# Patient Record
Sex: MALE | Race: ASIAN | NOT HISPANIC OR LATINO
[De-identification: names, ages, dates, MRNs, and addresses within clinical notes are randomized per-mention and may not be internally consistent; named-entity substitution may affect disease eponyms.]

---

## 2018-03-19 PROBLEM — Z00.00 ENCOUNTER FOR PREVENTIVE HEALTH EXAMINATION: Status: ACTIVE | Noted: 2018-03-19

## 2018-03-23 ENCOUNTER — APPOINTMENT (OUTPATIENT)
Dept: PLASTIC SURGERY | Facility: CLINIC | Age: 78
End: 2018-03-23

## 2019-07-25 ENCOUNTER — EMERGENCY (EMERGENCY)
Facility: HOSPITAL | Age: 79
LOS: 1 days | Discharge: HOME | End: 2019-07-25
Attending: EMERGENCY MEDICINE | Admitting: EMERGENCY MEDICINE
Payer: MEDICARE

## 2019-07-25 VITALS
HEART RATE: 82 BPM | TEMPERATURE: 98 F | DIASTOLIC BLOOD PRESSURE: 82 MMHG | HEIGHT: 66.93 IN | OXYGEN SATURATION: 95 % | SYSTOLIC BLOOD PRESSURE: 138 MMHG | RESPIRATION RATE: 16 BRPM | WEIGHT: 164.02 LBS

## 2019-07-25 LAB
ALBUMIN SERPL ELPH-MCNC: 3.6 G/DL — SIGNIFICANT CHANGE UP (ref 3.3–5)
ALP SERPL-CCNC: 108 U/L — SIGNIFICANT CHANGE UP (ref 30–120)
ALT FLD-CCNC: 32 U/L DA — SIGNIFICANT CHANGE UP (ref 10–60)
ANION GAP SERPL CALC-SCNC: 12 MMOL/L — SIGNIFICANT CHANGE UP (ref 5–17)
APPEARANCE UR: ABNORMAL
APTT BLD: 29.1 SEC — SIGNIFICANT CHANGE UP (ref 28.5–37)
AST SERPL-CCNC: 25 U/L — SIGNIFICANT CHANGE UP (ref 10–40)
BACTERIA # UR AUTO: ABNORMAL
BASOPHILS # BLD AUTO: 0.03 K/UL — SIGNIFICANT CHANGE UP (ref 0–0.2)
BASOPHILS NFR BLD AUTO: 0.2 % — SIGNIFICANT CHANGE UP (ref 0–2)
BILIRUB SERPL-MCNC: 0.6 MG/DL — SIGNIFICANT CHANGE UP (ref 0.2–1.2)
BILIRUB UR-MCNC: NEGATIVE — SIGNIFICANT CHANGE UP
BUN SERPL-MCNC: 11 MG/DL — SIGNIFICANT CHANGE UP (ref 7–23)
CALCIUM SERPL-MCNC: 8.9 MG/DL — SIGNIFICANT CHANGE UP (ref 8.4–10.5)
CHLORIDE SERPL-SCNC: 105 MMOL/L — SIGNIFICANT CHANGE UP (ref 96–108)
CO2 SERPL-SCNC: 20 MMOL/L — LOW (ref 22–31)
COLOR SPEC: YELLOW — SIGNIFICANT CHANGE UP
CREAT SERPL-MCNC: 0.98 MG/DL — SIGNIFICANT CHANGE UP (ref 0.5–1.3)
DIFF PNL FLD: ABNORMAL
EOSINOPHIL # BLD AUTO: 0.1 K/UL — SIGNIFICANT CHANGE UP (ref 0–0.5)
EOSINOPHIL NFR BLD AUTO: 0.7 % — SIGNIFICANT CHANGE UP (ref 0–6)
EPI CELLS # UR: SIGNIFICANT CHANGE UP
GLUCOSE SERPL-MCNC: 125 MG/DL — HIGH (ref 70–99)
GLUCOSE UR QL: NEGATIVE MG/DL — SIGNIFICANT CHANGE UP
HCT VFR BLD CALC: 39.9 % — SIGNIFICANT CHANGE UP (ref 39–50)
HGB BLD-MCNC: 13.6 G/DL — SIGNIFICANT CHANGE UP (ref 13–17)
IMM GRANULOCYTES NFR BLD AUTO: 0.7 % — SIGNIFICANT CHANGE UP (ref 0–1.5)
INR BLD: 0.96 RATIO — SIGNIFICANT CHANGE UP (ref 0.88–1.16)
KETONES UR-MCNC: NEGATIVE — SIGNIFICANT CHANGE UP
LEUKOCYTE ESTERASE UR-ACNC: ABNORMAL
LYMPHOCYTES # BLD AUTO: 1.69 K/UL — SIGNIFICANT CHANGE UP (ref 1–3.3)
LYMPHOCYTES # BLD AUTO: 12.5 % — LOW (ref 13–44)
MCHC RBC-ENTMCNC: 32.5 PG — SIGNIFICANT CHANGE UP (ref 27–34)
MCHC RBC-ENTMCNC: 34.1 GM/DL — SIGNIFICANT CHANGE UP (ref 32–36)
MCV RBC AUTO: 95.5 FL — SIGNIFICANT CHANGE UP (ref 80–100)
MONOCYTES # BLD AUTO: 0.95 K/UL — HIGH (ref 0–0.9)
MONOCYTES NFR BLD AUTO: 7.1 % — SIGNIFICANT CHANGE UP (ref 2–14)
NEUTROPHILS # BLD AUTO: 10.61 K/UL — HIGH (ref 1.8–7.4)
NEUTROPHILS NFR BLD AUTO: 78.8 % — HIGH (ref 43–77)
NITRITE UR-MCNC: POSITIVE
NRBC # BLD: 0 /100 WBCS — SIGNIFICANT CHANGE UP (ref 0–0)
PH UR: 5 — SIGNIFICANT CHANGE UP (ref 5–8)
PLATELET # BLD AUTO: 294 K/UL — SIGNIFICANT CHANGE UP (ref 150–400)
POTASSIUM SERPL-MCNC: 4.2 MMOL/L — SIGNIFICANT CHANGE UP (ref 3.5–5.3)
POTASSIUM SERPL-SCNC: 4.2 MMOL/L — SIGNIFICANT CHANGE UP (ref 3.5–5.3)
PROT SERPL-MCNC: 6.9 G/DL — SIGNIFICANT CHANGE UP (ref 6–8.3)
PROT UR-MCNC: 500 MG/DL
PROTHROM AB SERPL-ACNC: 10.5 SEC — SIGNIFICANT CHANGE UP (ref 10–12.9)
RBC # BLD: 4.18 M/UL — LOW (ref 4.2–5.8)
RBC # FLD: 12.9 % — SIGNIFICANT CHANGE UP (ref 10.3–14.5)
RBC CASTS # UR COMP ASSIST: ABNORMAL /HPF (ref 0–4)
SODIUM SERPL-SCNC: 137 MMOL/L — SIGNIFICANT CHANGE UP (ref 135–145)
SP GR SPEC: 1.01 — SIGNIFICANT CHANGE UP (ref 1.01–1.02)
UROBILINOGEN FLD QL: NEGATIVE MG/DL — SIGNIFICANT CHANGE UP
WBC # BLD: 13.47 K/UL — HIGH (ref 3.8–10.5)
WBC # FLD AUTO: 13.47 K/UL — HIGH (ref 3.8–10.5)
WBC UR QL: >50

## 2019-07-25 PROCEDURE — 85610 PROTHROMBIN TIME: CPT

## 2019-07-25 PROCEDURE — 74176 CT ABD & PELVIS W/O CONTRAST: CPT

## 2019-07-25 PROCEDURE — 36415 COLL VENOUS BLD VENIPUNCTURE: CPT

## 2019-07-25 PROCEDURE — 85730 THROMBOPLASTIN TIME PARTIAL: CPT

## 2019-07-25 PROCEDURE — 96374 THER/PROPH/DIAG INJ IV PUSH: CPT

## 2019-07-25 PROCEDURE — 99284 EMERGENCY DEPT VISIT MOD MDM: CPT | Mod: 25

## 2019-07-25 PROCEDURE — 85027 COMPLETE CBC AUTOMATED: CPT

## 2019-07-25 PROCEDURE — 81001 URINALYSIS AUTO W/SCOPE: CPT

## 2019-07-25 PROCEDURE — 80053 COMPREHEN METABOLIC PANEL: CPT

## 2019-07-25 PROCEDURE — 74176 CT ABD & PELVIS W/O CONTRAST: CPT | Mod: 26

## 2019-07-25 PROCEDURE — 99284 EMERGENCY DEPT VISIT MOD MDM: CPT

## 2019-07-25 PROCEDURE — 87086 URINE CULTURE/COLONY COUNT: CPT

## 2019-07-25 PROCEDURE — 51702 INSERT TEMP BLADDER CATH: CPT

## 2019-07-25 RX ORDER — SODIUM CHLORIDE 9 MG/ML
1000 INJECTION INTRAMUSCULAR; INTRAVENOUS; SUBCUTANEOUS ONCE
Refills: 0 | Status: COMPLETED | OUTPATIENT
Start: 2019-07-25 | End: 2019-07-25

## 2019-07-25 RX ORDER — CEFUROXIME AXETIL 250 MG
1 TABLET ORAL
Qty: 20 | Refills: 0
Start: 2019-07-25 | End: 2019-08-03

## 2019-07-25 RX ORDER — CEFTRIAXONE 500 MG/1
1000 INJECTION, POWDER, FOR SOLUTION INTRAMUSCULAR; INTRAVENOUS ONCE
Refills: 0 | Status: COMPLETED | OUTPATIENT
Start: 2019-07-25 | End: 2019-07-25

## 2019-07-25 RX ADMIN — CEFTRIAXONE 100 MILLIGRAM(S): 500 INJECTION, POWDER, FOR SOLUTION INTRAMUSCULAR; INTRAVENOUS at 07:20

## 2019-07-25 RX ADMIN — SODIUM CHLORIDE 1000 MILLILITER(S): 9 INJECTION INTRAMUSCULAR; INTRAVENOUS; SUBCUTANEOUS at 07:10

## 2019-07-25 NOTE — ED PROVIDER NOTE - CARE PLAN
Principal Discharge DX:	Urinary retention Principal Discharge DX:	Urinary retention  Secondary Diagnosis:	Acute cystitis without hematuria

## 2019-07-25 NOTE — ED ADULT NURSE NOTE - OBJECTIVE STATEMENT
78 yr old male c/o incomplete emptying of bladder, burning with urination, small clots with urination and suprapubic pressure x20 days

## 2019-07-25 NOTE — ED PROVIDER NOTE - CARE PROVIDER_API CALL
Brian Jj)  Urology  [address], [address]  [place] [address]  Phone: [phone]  Fax: [phone]  Follow Up Time:

## 2019-07-25 NOTE — ED PROVIDER NOTE - OBJECTIVE STATEMENT
Patient has had trouble passing urine for 2-3 days. Today, is passing small amounts of blood. Feels pressure in his lower abdomen. No fever. No n/v/d/c. No history of same problem.

## 2019-07-25 NOTE — ED ADULT NURSE NOTE - NSIMPLEMENTINTERV_GEN_ALL_ED
Implemented All Fall with Harm Risk Interventions:  [place] to call system. Call bell, personal items and telephone within reach. Instruct patient to call for assistance. Room bathroom lighting operational. Non-slip footwear when patient is off stretcher. Physically safe environment: no spills, clutter or unnecessary equipment. Stretcher in lowest position, wheels locked, appropriate side rails in place. Provide visual cue, wrist band, yellow gown, etc. Monitor gait and stability. Monitor for mental status changes and reorient to person, place, and time. Review medications for side effects contributing to fall risk. Reinforce activity limits and safety measures with patient and family. Provide visual clues: red socks.

## 2019-07-25 NOTE — ED PROVIDER NOTE - PROGRESS NOTE DETAILS
labs, ct results discussed with son, agrees to f/u with PMD, recommend urologist, given name of Dr. Jj

## 2019-07-26 ENCOUNTER — TRANSCRIPTION ENCOUNTER (OUTPATIENT)
Age: 79
End: 2019-07-26

## 2019-07-26 ENCOUNTER — APPOINTMENT (OUTPATIENT)
Dept: UROLOGY | Facility: CLINIC | Age: 79
End: 2019-07-26
Payer: MEDICARE

## 2019-07-26 DIAGNOSIS — E11.9 TYPE 2 DIABETES MELLITUS W/OUT COMPLICATIONS: ICD-10-CM

## 2019-07-26 DIAGNOSIS — Z80.0 FAMILY HISTORY OF MALIGNANT NEOPLASM OF DIGESTIVE ORGANS: ICD-10-CM

## 2019-07-26 DIAGNOSIS — Z83.3 FAMILY HISTORY OF DIABETES MELLITUS: ICD-10-CM

## 2019-07-26 DIAGNOSIS — Z86.79 PERSONAL HISTORY OF OTHER DISEASES OF THE CIRCULATORY SYSTEM: ICD-10-CM

## 2019-07-26 DIAGNOSIS — Z82.49 FAMILY HISTORY OF ISCHEMIC HEART DISEASE AND OTHER DISEASES OF THE CIRCULATORY SYSTEM: ICD-10-CM

## 2019-07-26 DIAGNOSIS — Z87.891 PERSONAL HISTORY OF NICOTINE DEPENDENCE: ICD-10-CM

## 2019-07-26 LAB
CULTURE RESULTS: SIGNIFICANT CHANGE UP
SPECIMEN SOURCE: SIGNIFICANT CHANGE UP

## 2019-07-26 PROCEDURE — 99205 OFFICE O/P NEW HI 60 MIN: CPT

## 2019-07-30 ENCOUNTER — EMERGENCY (EMERGENCY)
Facility: HOSPITAL | Age: 79
LOS: 1 days | Discharge: HOME | End: 2019-07-30
Attending: EMERGENCY MEDICINE | Admitting: EMERGENCY MEDICINE
Payer: MEDICARE

## 2019-07-30 VITALS
TEMPERATURE: 98 F | HEART RATE: 78 BPM | RESPIRATION RATE: 18 BRPM | OXYGEN SATURATION: 98 % | DIASTOLIC BLOOD PRESSURE: 70 MMHG | SYSTOLIC BLOOD PRESSURE: 130 MMHG

## 2019-07-30 VITALS
RESPIRATION RATE: 16 BRPM | HEIGHT: 68 IN | WEIGHT: 166.89 LBS | TEMPERATURE: 98 F | DIASTOLIC BLOOD PRESSURE: 87 MMHG | OXYGEN SATURATION: 98 % | SYSTOLIC BLOOD PRESSURE: 151 MMHG | HEART RATE: 65 BPM

## 2019-07-30 LAB
APPEARANCE UR: CLEAR — SIGNIFICANT CHANGE UP
BACTERIA # UR AUTO: ABNORMAL
BILIRUB UR-MCNC: NEGATIVE — SIGNIFICANT CHANGE UP
COLOR SPEC: YELLOW — SIGNIFICANT CHANGE UP
DIFF PNL FLD: ABNORMAL
EPI CELLS # UR: SIGNIFICANT CHANGE UP
GLUCOSE UR QL: NEGATIVE MG/DL — SIGNIFICANT CHANGE UP
KETONES UR-MCNC: NEGATIVE — SIGNIFICANT CHANGE UP
LEUKOCYTE ESTERASE UR-ACNC: ABNORMAL
NITRITE UR-MCNC: NEGATIVE — SIGNIFICANT CHANGE UP
PH UR: 7 — SIGNIFICANT CHANGE UP (ref 5–8)
PROT UR-MCNC: 30 MG/DL
RBC CASTS # UR COMP ASSIST: ABNORMAL /HPF (ref 0–4)
SP GR SPEC: 1.01 — SIGNIFICANT CHANGE UP (ref 1.01–1.02)
UROBILINOGEN FLD QL: NEGATIVE MG/DL — SIGNIFICANT CHANGE UP
WBC UR QL: ABNORMAL

## 2019-07-30 PROCEDURE — 51702 INSERT TEMP BLADDER CATH: CPT

## 2019-07-30 PROCEDURE — 81001 URINALYSIS AUTO W/SCOPE: CPT

## 2019-07-30 PROCEDURE — 99284 EMERGENCY DEPT VISIT MOD MDM: CPT

## 2019-07-30 PROCEDURE — 99284 EMERGENCY DEPT VISIT MOD MDM: CPT | Mod: 25

## 2019-07-30 RX ORDER — LIDOCAINE HCL 20 MG/ML
10 VIAL (ML) INJECTION ONCE
Refills: 0 | Status: COMPLETED | OUTPATIENT
Start: 2019-07-30 | End: 2019-07-30

## 2019-07-30 RX ORDER — TRAMADOL HYDROCHLORIDE 50 MG/1
50 TABLET ORAL ONCE
Refills: 0 | Status: DISCONTINUED | OUTPATIENT
Start: 2019-07-30 | End: 2019-07-30

## 2019-07-30 RX ORDER — TRAMADOL HYDROCHLORIDE 50 MG/1
1 TABLET ORAL
Qty: 12 | Refills: 0
Start: 2019-07-30

## 2019-07-30 RX ORDER — ACETAMINOPHEN 500 MG
650 TABLET ORAL ONCE
Refills: 0 | Status: COMPLETED | OUTPATIENT
Start: 2019-07-30 | End: 2019-07-30

## 2019-07-30 RX ADMIN — TRAMADOL HYDROCHLORIDE 50 MILLIGRAM(S): 50 TABLET ORAL at 19:52

## 2019-07-30 RX ADMIN — Medication 650 MILLIGRAM(S): at 19:35

## 2019-07-30 RX ADMIN — Medication 650 MILLIGRAM(S): at 19:16

## 2019-07-30 RX ADMIN — Medication 10 MILLILITER(S): at 19:16

## 2019-07-30 NOTE — ED PROCEDURE NOTE - CPROC ED URIN DEVICE DETAIL1
Using strict sterile technique, an indwelling urinary device was inserted through the urethral meatus, and into the bladder (see size above)./sterile technique, old indwelling urinary removed and new one inserted
Using strict sterile technique, an indwelling urinary device was inserted through the urethral meatus, and into the bladder (see size above).

## 2019-07-30 NOTE — ED ADULT NURSE NOTE - OBJECTIVE STATEMENT
Amb to ED with his son. Pt had callaway placed 5 days ago for hematuria- seen the following day by his urologist Dr Rodgers. Today states he is having pain at penis & noted his pants were wet. O/E callaway draining clear, light yellow urine. Undergarment wet with urine.

## 2019-07-30 NOTE — ED PROCEDURE NOTE - CPROC ED POST PROC CARE GUIDE1
Instructed patient/caregiver to follow-up with primary care physician./Verbal/written post procedure instructions were given to patient/caregiver.
follow up with urology as scheduled./Verbal/written post procedure instructions were given to patient/caregiver.

## 2019-07-30 NOTE — ED PROVIDER NOTE - PROGRESS NOTE DETAILS
18Fr callaway cath placed easily into bladder with drainage of clear yellow urine. No leakage noted. Upon discharge pt complained of too much urethral pain from 18 fr callaway and requested it be removed and 16 Fr callaway be inserted. Rx Tramadol for pain.

## 2019-07-30 NOTE — ED PROCEDURE NOTE - CPROC ED TIME OUT STATEMENT1
“Patient's name, , procedure and correct site were confirmed during the [facility] Timeout.”
“Patient's name, , procedure and correct site were confirmed during the [facility] Timeout.”

## 2019-07-30 NOTE — ED PROVIDER NOTE - CLINICAL SUMMARY MEDICAL DECISION MAKING FREE TEXT BOX
leaking callaway , plan - will replace callaway and place in larger size and follow up with urology as scheduled.

## 2019-07-30 NOTE — ED PROVIDER NOTE - OBJECTIVE STATEMENT
79 y/o male with a PMHx of HTN, DM, BPH presents to the ED c/o urinary retention. On 7/25 was seen at the ED for hematuria, was dx with a UTI and had a catheter placed and rx abx. The next day pt follow up with a urologist. Today pt has some facial swelling, had some discomfort earlier from urinary retention but now resolved. No other sx or pain/discomfort. Has an appointment with urologist 8/2.     Urologist: Soni Coleman  Son translated per pt upon pt request. 79 y/o male with a PMHx of HTN, DM, BPH presents to the ED c/o urinary retention. On 7/25 was seen at the ED for hematuria and urinary retention, was dx with a UTI and had a catheter placed and rx abx. The next day pt follow up with a urologist. Today pt has some facial swelling, had some discomfort earlier from urinary retention but now resolved. No other sx or pain/discomfort. Has an appointment with urologist 8/2.     Urologist: Soni Coleman  Son translated per pt upon pt request. 77 y/o male with a PMHx of HTN, DM, BPH presents to the ED c/o urinary retention. On 7/25 was seen at the ED for hematuria and urinary retention, was dx with a UTI and had a catheter placed and rx abx. The next day pt follow up with a urologist. Today pt has some facial swelling, had some discomfort earlier from urinary retention but now resolved. No other sx or pain/discomfort. Has an appointment with urologist 8/2.   Pt and son are poor historians and cannot give much info about medications or medical hx.  Urologist: Soni Coleman  Son translated per pt upon pt request.

## 2019-07-30 NOTE — ED PROVIDER NOTE - GENITOURINARY BLADDER
callaway catheter in lace with clear yellow urine in leg bag, some leaking noted around callaway./non-tender/non-distended

## 2019-07-30 NOTE — ED ADULT TRIAGE NOTE - CHIEF COMPLAINT QUOTE
" Last Thursday catheter was inserted because he could not pee. He is complaining  of pain on his penis "

## 2019-07-30 NOTE — ED PROCEDURE NOTE - NS_ATTENDINGSCRIBE_ED_ALL_ED
----- Message from Juliana Cardona sent at 8/24/2018 10:29 AM CDT -----  Contact: Self/ [phone]  Pt requesting antibiotics to be called in. Says she has been having fever for a couple days. Please call to advise. Thank you.    [facility] - [address] [address]  Phone: [phone] Fax: [phone]  
Patient reports having a low-grade fever for the last 4 days accompanied by generalized body aches.  OV scheduled for today with Sarah DAVIS.  
I personally performed the service described in the documentation recorded by the scribe in my presence, and it accurately and completely records my words and actions.

## 2019-07-31 ENCOUNTER — EMERGENCY (EMERGENCY)
Facility: HOSPITAL | Age: 79
LOS: 1 days | Discharge: LEFT BEFORE DISCHARGE | End: 2019-07-31
Attending: EMERGENCY MEDICINE | Admitting: EMERGENCY MEDICINE
Payer: MEDICARE

## 2019-07-31 VITALS
TEMPERATURE: 98 F | OXYGEN SATURATION: 96 % | DIASTOLIC BLOOD PRESSURE: 68 MMHG | SYSTOLIC BLOOD PRESSURE: 136 MMHG | HEART RATE: 65 BPM | RESPIRATION RATE: 17 BRPM

## 2019-07-31 VITALS
HEART RATE: 65 BPM | OXYGEN SATURATION: 96 % | RESPIRATION RATE: 18 BRPM | DIASTOLIC BLOOD PRESSURE: 91 MMHG | HEIGHT: 68 IN | TEMPERATURE: 98 F | SYSTOLIC BLOOD PRESSURE: 148 MMHG | WEIGHT: 166.89 LBS

## 2019-07-31 LAB
ALBUMIN SERPL ELPH-MCNC: 3.7 G/DL — SIGNIFICANT CHANGE UP (ref 3.3–5)
ALP SERPL-CCNC: 107 U/L — SIGNIFICANT CHANGE UP (ref 30–120)
ALT FLD-CCNC: 34 U/L DA — SIGNIFICANT CHANGE UP (ref 10–60)
ANION GAP SERPL CALC-SCNC: 8 MMOL/L — SIGNIFICANT CHANGE UP (ref 5–17)
ANION GAP SERPL CALC-SCNC: 9 MMOL/L — SIGNIFICANT CHANGE UP (ref 5–17)
AST SERPL-CCNC: 26 U/L — SIGNIFICANT CHANGE UP (ref 10–40)
BASOPHILS # BLD AUTO: 0.03 K/UL — SIGNIFICANT CHANGE UP (ref 0–0.2)
BASOPHILS NFR BLD AUTO: 0.3 % — SIGNIFICANT CHANGE UP (ref 0–2)
BILIRUB SERPL-MCNC: 1 MG/DL — SIGNIFICANT CHANGE UP (ref 0.2–1.2)
BUN SERPL-MCNC: 10 MG/DL — SIGNIFICANT CHANGE UP (ref 7–23)
BUN SERPL-MCNC: 11 MG/DL — SIGNIFICANT CHANGE UP (ref 7–23)
CALCIUM SERPL-MCNC: 7.6 MG/DL — LOW (ref 8.4–10.5)
CALCIUM SERPL-MCNC: 8.8 MG/DL — SIGNIFICANT CHANGE UP (ref 8.4–10.5)
CHLORIDE SERPL-SCNC: 88 MMOL/L — LOW (ref 96–108)
CHLORIDE SERPL-SCNC: 96 MMOL/L — SIGNIFICANT CHANGE UP (ref 96–108)
CO2 SERPL-SCNC: 21 MMOL/L — LOW (ref 22–31)
CO2 SERPL-SCNC: 22 MMOL/L — SIGNIFICANT CHANGE UP (ref 22–31)
CREAT SERPL-MCNC: 0.72 MG/DL — SIGNIFICANT CHANGE UP (ref 0.5–1.3)
CREAT SERPL-MCNC: 0.81 MG/DL — SIGNIFICANT CHANGE UP (ref 0.5–1.3)
EOSINOPHIL # BLD AUTO: 0.05 K/UL — SIGNIFICANT CHANGE UP (ref 0–0.5)
EOSINOPHIL NFR BLD AUTO: 0.5 % — SIGNIFICANT CHANGE UP (ref 0–6)
GLUCOSE SERPL-MCNC: 127 MG/DL — HIGH (ref 70–99)
GLUCOSE SERPL-MCNC: 151 MG/DL — HIGH (ref 70–99)
HCT VFR BLD CALC: 36 % — LOW (ref 39–50)
HGB BLD-MCNC: 12.8 G/DL — LOW (ref 13–17)
IMM GRANULOCYTES NFR BLD AUTO: 0.7 % — SIGNIFICANT CHANGE UP (ref 0–1.5)
LYMPHOCYTES # BLD AUTO: 1.35 K/UL — SIGNIFICANT CHANGE UP (ref 1–3.3)
LYMPHOCYTES # BLD AUTO: 13.8 % — SIGNIFICANT CHANGE UP (ref 13–44)
MAGNESIUM SERPL-MCNC: 1.9 MG/DL — SIGNIFICANT CHANGE UP (ref 1.6–2.6)
MCHC RBC-ENTMCNC: 32.2 PG — SIGNIFICANT CHANGE UP (ref 27–34)
MCHC RBC-ENTMCNC: 35.6 GM/DL — SIGNIFICANT CHANGE UP (ref 32–36)
MCV RBC AUTO: 90.5 FL — SIGNIFICANT CHANGE UP (ref 80–100)
MONOCYTES # BLD AUTO: 0.87 K/UL — SIGNIFICANT CHANGE UP (ref 0–0.9)
MONOCYTES NFR BLD AUTO: 8.9 % — SIGNIFICANT CHANGE UP (ref 2–14)
NEUTROPHILS # BLD AUTO: 7.42 K/UL — HIGH (ref 1.8–7.4)
NEUTROPHILS NFR BLD AUTO: 75.8 % — SIGNIFICANT CHANGE UP (ref 43–77)
NRBC # BLD: 0 /100 WBCS — SIGNIFICANT CHANGE UP (ref 0–0)
PLATELET # BLD AUTO: 282 K/UL — SIGNIFICANT CHANGE UP (ref 150–400)
POTASSIUM SERPL-MCNC: 3.7 MMOL/L — SIGNIFICANT CHANGE UP (ref 3.5–5.3)
POTASSIUM SERPL-MCNC: 3.8 MMOL/L — SIGNIFICANT CHANGE UP (ref 3.5–5.3)
POTASSIUM SERPL-SCNC: 3.7 MMOL/L — SIGNIFICANT CHANGE UP (ref 3.5–5.3)
POTASSIUM SERPL-SCNC: 3.8 MMOL/L — SIGNIFICANT CHANGE UP (ref 3.5–5.3)
PROT SERPL-MCNC: 7.1 G/DL — SIGNIFICANT CHANGE UP (ref 6–8.3)
RBC # BLD: 3.98 M/UL — LOW (ref 4.2–5.8)
RBC # FLD: 11.9 % — SIGNIFICANT CHANGE UP (ref 10.3–14.5)
SODIUM SERPL-SCNC: 119 MMOL/L — CRITICAL LOW (ref 135–145)
SODIUM SERPL-SCNC: 125 MMOL/L — LOW (ref 135–145)
WBC # BLD: 9.79 K/UL — SIGNIFICANT CHANGE UP (ref 3.8–10.5)
WBC # FLD AUTO: 9.79 K/UL — SIGNIFICANT CHANGE UP (ref 3.8–10.5)

## 2019-07-31 PROCEDURE — 80053 COMPREHEN METABOLIC PANEL: CPT

## 2019-07-31 PROCEDURE — 36415 COLL VENOUS BLD VENIPUNCTURE: CPT

## 2019-07-31 PROCEDURE — 70450 CT HEAD/BRAIN W/O DYE: CPT | Mod: 26

## 2019-07-31 PROCEDURE — 96361 HYDRATE IV INFUSION ADD-ON: CPT

## 2019-07-31 PROCEDURE — 70450 CT HEAD/BRAIN W/O DYE: CPT

## 2019-07-31 PROCEDURE — 83735 ASSAY OF MAGNESIUM: CPT

## 2019-07-31 PROCEDURE — 99284 EMERGENCY DEPT VISIT MOD MDM: CPT | Mod: 25

## 2019-07-31 PROCEDURE — 96374 THER/PROPH/DIAG INJ IV PUSH: CPT

## 2019-07-31 PROCEDURE — 85027 COMPLETE CBC AUTOMATED: CPT

## 2019-07-31 PROCEDURE — 80048 BASIC METABOLIC PNL TOTAL CA: CPT

## 2019-07-31 PROCEDURE — 99284 EMERGENCY DEPT VISIT MOD MDM: CPT

## 2019-07-31 RX ORDER — SODIUM CHLORIDE 9 MG/ML
1000 INJECTION INTRAMUSCULAR; INTRAVENOUS; SUBCUTANEOUS ONCE
Refills: 0 | Status: COMPLETED | OUTPATIENT
Start: 2019-07-31 | End: 2019-07-31

## 2019-07-31 RX ORDER — MECLIZINE HCL 12.5 MG
25 TABLET ORAL ONCE
Refills: 0 | Status: COMPLETED | OUTPATIENT
Start: 2019-07-31 | End: 2019-07-31

## 2019-07-31 RX ORDER — ONDANSETRON 8 MG/1
4 TABLET, FILM COATED ORAL ONCE
Refills: 0 | Status: COMPLETED | OUTPATIENT
Start: 2019-07-31 | End: 2019-07-31

## 2019-07-31 RX ADMIN — SODIUM CHLORIDE 1000 MILLILITER(S): 9 INJECTION INTRAMUSCULAR; INTRAVENOUS; SUBCUTANEOUS at 20:15

## 2019-07-31 RX ADMIN — SODIUM CHLORIDE 500 MILLILITER(S): 9 INJECTION INTRAMUSCULAR; INTRAVENOUS; SUBCUTANEOUS at 21:30

## 2019-07-31 RX ADMIN — ONDANSETRON 4 MILLIGRAM(S): 8 TABLET, FILM COATED ORAL at 20:20

## 2019-07-31 RX ADMIN — Medication 25 MILLIGRAM(S): at 20:20

## 2019-07-31 RX ADMIN — SODIUM CHLORIDE 1000 MILLILITER(S): 9 INJECTION INTRAMUSCULAR; INTRAVENOUS; SUBCUTANEOUS at 22:30

## 2019-07-31 RX ADMIN — SODIUM CHLORIDE 1000 MILLILITER(S): 9 INJECTION INTRAMUSCULAR; INTRAVENOUS; SUBCUTANEOUS at 21:15

## 2019-07-31 NOTE — ED ADULT NURSE NOTE - NS ED NURSE LEVEL OF CONSCIOUSNESS AFFECT
Also ot req that we reactivate her Mychart. I did that and it is in drawer for her to  and new password as well. Calm

## 2019-07-31 NOTE — ED ADULT TRIAGE NOTE - CHIEF COMPLAINT QUOTE
" He was here last night because he had a lot pain on he callaway catheter - It was changed last night and was given pain medication. He is dizzy and he vomited several times today "

## 2019-07-31 NOTE — ED PROVIDER NOTE - CLINICAL SUMMARY MEDICAL DECISION MAKING FREE TEXT BOX
77 y/o male her with sx of vertigo. given advanced age and comorbidities, will eval for CVA. will treat with antinausea medication and Meclozine, and will reassess.

## 2019-07-31 NOTE — ED PROVIDER NOTE - NEUROLOGICAL, MLM
Alert and oriented, no focal deficits, no motor or sensory deficits. vertigo triggered with head movement, FTM intact b/l.

## 2019-07-31 NOTE — ED PROVIDER NOTE - PROGRESS NOTE DETAILS
feels much better, vertigo may be side effect of new pain medication, tramadol, advise pt to stop, pt had acute drop in NaCl since 1 wk ago, will replete and recheck, if improves will discharge home tonight d/w pt and son that pt still has very mild vertigo, still Na low, though not as critical, pt wants to go home,

## 2019-07-31 NOTE — ED ADULT NURSE NOTE - OBJECTIVE STATEMENT
Was seen in ED yesterday and had callaway changed. Patient returned to ED today for vomiting and dizziness brought in by family. Was seen in ED yesterday and had callaway changed because he was c/o pain from the callaway. Patient has the callaway inserted 5 days ago for urinary retention and had follow up with urologist but came yesterday for change of callaway due to pain. Patient returned to ED today for vomiting and dizziness brought in by family. Family who translates for patient states he feels dizzy like a room spinning dizziness. Callaway to leg bag noted to have clear yellow urine draining. Patient is scheduled to have catheter removed by urologist on 8/2/19

## 2019-07-31 NOTE — ED PROVIDER NOTE - OBJECTIVE STATEMENT
79 y/o male with PMHx of HTN, DM presents to the ED c/o nausea, dizziness described as room spinning beginning yesterday after dc home from ED. Aggravated with ambulation, moving head up and down, lying down. Pt also with R sided flank pain, burning pelvic pain. No prior episodes. Seen yesterday for urinary retention, catheter changed from 18Fr  to 16Fr in ED. Initial catheter placed last week for hematuria. Seeing urologist this week for further testing. Denies numbness, tingling, fever, abd pain. Kidney function normal as per endocrinologist. Son reports change in BGM from 600 to 150 yesterday.

## 2019-07-31 NOTE — ED ADULT NURSE NOTE - EXPLANATION OF PATIENT'S REASON FOR LEAVING
Patient feels much better, nausea relieved and dizziness improved. Wants to go home to follow up with PMD in AM

## 2019-08-02 ENCOUNTER — APPOINTMENT (OUTPATIENT)
Dept: UROLOGY | Facility: CLINIC | Age: 79
End: 2019-08-02

## 2019-08-02 VITALS
OXYGEN SATURATION: 90 % | TEMPERATURE: 97.7 F | HEART RATE: 84 BPM | SYSTOLIC BLOOD PRESSURE: 149 MMHG | DIASTOLIC BLOOD PRESSURE: 76 MMHG

## 2019-08-02 PROBLEM — Z00.00 ENCOUNTER FOR PREVENTIVE HEALTH EXAMINATION: Noted: 2019-08-02

## 2019-08-04 ENCOUNTER — FORM ENCOUNTER (OUTPATIENT)
Age: 79
End: 2019-08-04

## 2019-08-05 ENCOUNTER — APPOINTMENT (OUTPATIENT)
Dept: ULTRASOUND IMAGING | Facility: CLINIC | Age: 79
End: 2019-08-05

## 2019-08-05 ENCOUNTER — OUTPATIENT (OUTPATIENT)
Dept: OUTPATIENT SERVICES | Facility: HOSPITAL | Age: 79
LOS: 1 days | End: 2019-08-05
Payer: MEDICARE

## 2019-08-05 DIAGNOSIS — Z00.8 ENCOUNTER FOR OTHER GENERAL EXAMINATION: ICD-10-CM

## 2019-08-05 PROCEDURE — 76770 US EXAM ABDO BACK WALL COMP: CPT | Mod: 26

## 2019-08-05 PROCEDURE — 76770 US EXAM ABDO BACK WALL COMP: CPT

## 2019-08-05 NOTE — ASSESSMENT
[FreeTextEntry1] : 77 yo M with gross hematuria, history of BPH\par \par - Stop Myrbetriq\par - Continue tamsulosin\par - Will schedule cysto and plan for trial of void at that time

## 2019-08-05 NOTE — PHYSICAL EXAM
[General Appearance - Well Developed] : well developed [General Appearance - Well Nourished] : well nourished [Well Groomed] : well groomed [Normal Appearance] : normal appearance [General Appearance - In No Acute Distress] : no acute distress [Edema] : no peripheral edema [Respiration, Rhythm And Depth] : normal respiratory rhythm and effort [Abdomen Soft] : soft [Exaggerated Use Of Accessory Muscles For Inspiration] : no accessory muscle use [Abdomen Tenderness] : non-tender [Costovertebral Angle Tenderness] : no ~M costovertebral angle tenderness [Urethral Meatus] : meatus normal [Scrotum] : the scrotum was normal [Penis Abnormality] : normal circumcised penis [Urinary Bladder Findings] : the bladder was normal on palpation [No Focal Deficits] : no focal deficits [] : no rash [Normal Station and Gait] : the gait and station were normal for the patient's age [Mood] : the mood was normal [Affect] : the affect was normal [Oriented To Time, Place, And Person] : oriented to person, place, and time [No Palpable Adenopathy] : no palpable adenopathy [Not Anxious] : not anxious [Epididymis] : the epididymides were normal [Testes Tenderness] : no tenderness of the testes [Testes Mass (___cm)] : there were no testicular masses [FreeTextEntry1] : Ta draining yellow urine

## 2019-08-05 NOTE — REVIEW OF SYSTEMS
[Dry Eyes] : dryness of the eyes [Heartburn] : heartburn [Eyesight Problems] : eyesight problems [Told you have blood in urine on a urine test] : told blood was present in a urine test [Pain during urination] : pain during urination [Blood in urine that you can see] : blood visible in urine [Strong urge to urinate] : strong urge to urinate [Strain or push to urinate] : strain or push to urinate [Wait a long time to urinate] : waits a long time to urinate [Slow urine stream] : slow urine stream [Interrupted urine stream] : interrupted urine stream [Bladder fullness after urinating] : bladder fullness after urinating [Dizziness] : dizziness [Muscle Weakness] : muscle weakness [Feelings Of Weakness] : feelings of weakness [Negative] : Heme/Lymph [FreeTextEntry2] : htn

## 2019-08-05 NOTE — HISTORY OF PRESENT ILLNESS
[FreeTextEntry1] : 79 yo Arabic speaking M \par 2 days ago, noticed some weak stream and then suddenly gross hematuria\par Went to ER yesterday and had catheter placed\par Prior to this states that he was voiding fine (every 2 hours)\par nocturia 2/night, weak stream and some straining\par no incontinence\par no prior history of gross hematuria\par Had one episode of urinary retention many years ago per pt\par On myrbetriq and tamsulosin for many years \par Normal bowel movements

## 2019-08-09 ENCOUNTER — APPOINTMENT (OUTPATIENT)
Age: 79
End: 2019-08-09
Payer: MEDICARE

## 2019-08-09 VITALS
OXYGEN SATURATION: 96 % | HEART RATE: 59 BPM | TEMPERATURE: 98.3 F | DIASTOLIC BLOOD PRESSURE: 71 MMHG | SYSTOLIC BLOOD PRESSURE: 136 MMHG

## 2019-08-09 PROCEDURE — 52000 CYSTOURETHROSCOPY: CPT

## 2019-08-16 ENCOUNTER — APPOINTMENT (OUTPATIENT)
Age: 79
End: 2019-08-16
Payer: MEDICARE

## 2019-08-16 PROCEDURE — 51798 US URINE CAPACITY MEASURE: CPT

## 2019-08-16 PROCEDURE — 99213 OFFICE O/P EST LOW 20 MIN: CPT | Mod: 25

## 2019-08-16 NOTE — ASSESSMENT
[FreeTextEntry1] : 79 yo M with recent urinary retention secondary to BPH\par \par - PVR = 52ml today. No further need for catheter at this time\par - COntinue tamsulosin and hold myrbetriq\par - Reassured pt regarding stream and continue observation\par - FU in 1 month

## 2019-08-16 NOTE — PHYSICAL EXAM
[General Appearance - Well Developed] : well developed [General Appearance - Well Nourished] : well nourished [Normal Appearance] : normal appearance [Well Groomed] : well groomed [General Appearance - In No Acute Distress] : no acute distress [Abdomen Soft] : soft [Costovertebral Angle Tenderness] : no ~M costovertebral angle tenderness [Urethral Meatus] : meatus normal [Abdomen Tenderness] : non-tender [Penis Abnormality] : normal circumcised penis [Scrotum] : the scrotum was normal [Urinary Bladder Findings] : the bladder was normal on palpation [Testes Mass (___cm)] : there were no testicular masses [Testes Tenderness] : no tenderness of the testes [Epididymis] : the epididymides were normal [Edema] : no peripheral edema [] : no respiratory distress [Respiration, Rhythm And Depth] : normal respiratory rhythm and effort [Oriented To Time, Place, And Person] : oriented to person, place, and time [Exaggerated Use Of Accessory Muscles For Inspiration] : no accessory muscle use [Mood] : the mood was normal [Affect] : the affect was normal [Normal Station and Gait] : the gait and station were normal for the patient's age [Not Anxious] : not anxious [No Palpable Adenopathy] : no palpable adenopathy [No Focal Deficits] : no focal deficits

## 2019-08-16 NOTE — HISTORY OF PRESENT ILLNESS
[FreeTextEntry1] : 77 yo Maori speaking M \par 2 days ago, noticed some weak stream and then suddenly gross hematuria\par Went to ER yesterday and had catheter placed\par Prior to this states that he was voiding fine (every 2 hours)\par nocturia 2/night, weak stream and some straining\par no incontinence\par no prior history of gross hematuria\par Had one episode of urinary retention many years ago per pt\par On myrbetriq and tamsulosin for many years \par Normal bowel movements\par \par 8/16/19 Interval history: Had cysto at last visit which showed BPH.\par Pt was able to void after cysto \par Since then, pt states that he feels like he is voiding almost to completion but has been having some bothersome urinary spraying\par No recurrence of gross hematuria, no fever\par Still having some intermittent right flank pain. Renal US was negative for hydro or stones

## 2019-09-20 ENCOUNTER — APPOINTMENT (OUTPATIENT)
Age: 79
End: 2019-09-20
Payer: MEDICARE

## 2019-09-20 PROCEDURE — 99213 OFFICE O/P EST LOW 20 MIN: CPT | Mod: 25

## 2019-09-20 PROCEDURE — 51798 US URINE CAPACITY MEASURE: CPT

## 2019-09-20 NOTE — PHYSICAL EXAM
[General Appearance - Well Developed] : well developed [General Appearance - Well Nourished] : well nourished [Well Groomed] : well groomed [Normal Appearance] : normal appearance [Abdomen Soft] : soft [General Appearance - In No Acute Distress] : no acute distress [Costovertebral Angle Tenderness] : no ~M costovertebral angle tenderness [Abdomen Tenderness] : non-tender [Urethral Meatus] : meatus normal [Penis Abnormality] : normal circumcised penis [Urinary Bladder Findings] : the bladder was normal on palpation [Scrotum] : the scrotum was normal [Epididymis] : the epididymides were normal [Testes Tenderness] : no tenderness of the testes [Testes Mass (___cm)] : there were no testicular masses [FreeTextEntry1] : deferred today [] : no respiratory distress [Edema] : no peripheral edema [Exaggerated Use Of Accessory Muscles For Inspiration] : no accessory muscle use [Respiration, Rhythm And Depth] : normal respiratory rhythm and effort [Affect] : the affect was normal [Oriented To Time, Place, And Person] : oriented to person, place, and time [Mood] : the mood was normal [Not Anxious] : not anxious [No Focal Deficits] : no focal deficits [Normal Station and Gait] : the gait and station were normal for the patient's age [No Palpable Adenopathy] : no palpable adenopathy

## 2019-09-20 NOTE — HISTORY OF PRESENT ILLNESS
[FreeTextEntry1] : 77 yo Slovenian speaking M \par 2 days ago, noticed some weak stream and then suddenly gross hematuria\par Went to ER yesterday and had catheter placed\par Prior to this states that he was voiding fine (every 2 hours)\par nocturia 2/night, weak stream and some straining\par no incontinence\par no prior history of gross hematuria\par Had one episode of urinary retention many years ago per pt\par On myrbetriq and tamsulosin for many years \par Normal bowel movements\par \par 8/16/19 Interval history: Had cysto at last visit which showed BPH.\par Pt was able to void after cysto \par Since then, pt states that he feels like he is voiding almost to completion but has been having some bothersome urinary spraying\par No recurrence of gross hematuria, no fever\par Still having some intermittent right flank pain. Renal US was negative for hydro or stones\par \par 9/20/19 Interval history: Has been doing well since last visit\par Only complaint is stream is diverted to the left and causes a mess everytime he urinates

## 2019-09-20 NOTE — ASSESSMENT
[FreeTextEntry1] : 79 yo M with recent urinary retention secondary to BPH, doing well on dual medical therapy\par \par - Bladder scan showed 120ml\par - Continue tamsulosin and finasteride. Pt will bring all meds with him at the next visit to confirm.\par - Discussed pros and cons of a TURP given his history of urinary retention. Pt would like to hold off for now.\par - FU in 3 months

## 2019-12-20 ENCOUNTER — APPOINTMENT (OUTPATIENT)
Age: 79
End: 2019-12-20

## 2019-12-27 ENCOUNTER — APPOINTMENT (OUTPATIENT)
Age: 79
End: 2019-12-27

## 2020-01-10 ENCOUNTER — APPOINTMENT (OUTPATIENT)
Dept: UROLOGY | Facility: CLINIC | Age: 80
End: 2020-01-10
Payer: MEDICARE

## 2020-01-10 DIAGNOSIS — R33.8 OTHER RETENTION OF URINE: ICD-10-CM

## 2020-01-10 DIAGNOSIS — N39.0 URINARY TRACT INFECTION, SITE NOT SPECIFIED: ICD-10-CM

## 2020-01-10 PROCEDURE — 99213 OFFICE O/P EST LOW 20 MIN: CPT | Mod: 25

## 2020-01-10 PROCEDURE — 51798 US URINE CAPACITY MEASURE: CPT

## 2020-01-10 NOTE — HISTORY OF PRESENT ILLNESS
[FreeTextEntry1] : 77 yo Telugu speaking M \par 2 days ago, noticed some weak stream and then suddenly gross hematuria\par Went to ER yesterday and had catheter placed\par Prior to this states that he was voiding fine (every 2 hours)\par nocturia 2/night, weak stream and some straining\par no incontinence\par no prior history of gross hematuria\par Had one episode of urinary retention many years ago per pt\par On myrbetriq and tamsulosin for many years \par Normal bowel movements\par \par 8/16/19 Interval history: Had cysto at last visit which showed BPH.\par Pt was able to void after cysto \par Since then, pt states that he feels like he is voiding almost to completion but has been having some bothersome urinary spraying\par No recurrence of gross hematuria, no fever\par Still having some intermittent right flank pain. Renal US was negative for hydro or stones\par \par 9/20/19 Interval history: Has been doing well since last visit\par Only complaint is stream is diverted to the left and causes a mess everytime he urinates\par \par 1/10/20 Interval history: Ran out of BPH meds and went back to his previous urologist (Dr. Dumont) for refills\par Was told that he had a UTI and currently on Cipro\par Otherwise, feels like he is voiding well with no issues

## 2020-01-10 NOTE — PHYSICAL EXAM
[General Appearance - Well Developed] : well developed [General Appearance - Well Nourished] : well nourished [Normal Appearance] : normal appearance [Well Groomed] : well groomed [General Appearance - In No Acute Distress] : no acute distress [Abdomen Soft] : soft [Costovertebral Angle Tenderness] : no ~M costovertebral angle tenderness [Abdomen Tenderness] : non-tender [Urethral Meatus] : meatus normal [Penis Abnormality] : normal circumcised penis [Urinary Bladder Findings] : the bladder was normal on palpation [Scrotum] : the scrotum was normal [Epididymis] : the epididymides were normal [Testes Mass (___cm)] : there were no testicular masses [Testes Tenderness] : no tenderness of the testes [FreeTextEntry1] : deferred today [Edema] : no peripheral edema [Respiration, Rhythm And Depth] : normal respiratory rhythm and effort [] : no respiratory distress [Oriented To Time, Place, And Person] : oriented to person, place, and time [Exaggerated Use Of Accessory Muscles For Inspiration] : no accessory muscle use [Not Anxious] : not anxious [Mood] : the mood was normal [Affect] : the affect was normal [Normal Station and Gait] : the gait and station were normal for the patient's age [No Focal Deficits] : no focal deficits [No Palpable Adenopathy] : no palpable adenopathy

## 2020-01-10 NOTE — ASSESSMENT
[FreeTextEntry1] : 78 yo M with BPH, prior history of urinary retention, UTI\par \par - PVR = 42ml today\par - Continue tamsulosin and finasteride\par - discussed possible etiologies for UTI. Spent extensive period of time discussed behavioral modification including adequate hydration, cutting back on caffeine intake, timed voiding during the day, importance of controlling any diabetes and chronic constipation and how all of these things could potentially increase risk for persistent or recurrent UTI.\par - FU in 6 months\par

## 2020-02-14 NOTE — ED ADULT NURSE NOTE - CAS DISCH TRANSFER METHOD
[facility]     Jen MARION [birth year]  1951   [id number]      Patient Care Team:  Yi Edouard MD as PCP - General (Internal Medicine)  Yi Edouard MD as PCP - Claims Attributed  Pierre Carreno MD as Consulting Physician (Gastroenterology)    Chief Complaint   Patient presents with   • Chills     symptoms started about 5 days ago   • Fever   • Diarrhea   • Nausea   • Nasal Congestion   • Fatigue            HPI  Patient is a 68 y.o. female presents with fever chills. Onset of symptoms was abrupt starting 5 days ago.  Chronicity acute. Severity severe.  Symptoms are associated with myalgias,fatigue, malaise, ear pain,sinus congestion now somewhat better,little cough,nasal congestion. Pertinent negatives no sore throat,shortness of breath.   Symptoms are aggravated by lying down.   Symptoms improve with stahist may help a little..  Context coworker has pneumonia, a lot of them have been ill.     HPI  Diarrhea started yesterday. With nausea. Otc med helps the nausea.    CHRONIC CONDITIONS  Temazepam helps sleep. Takes it early so avoids hangover.     Venlafaxine helps depression anxiety.  Takes clonazepam as needed.  No side effects.  It does help her.  Had been feeling well before this week.    She is active at work on her feet all day and taking a lot of steps during the day.  She has intended to start going to the gym as well but has not made yet.  She does eat a low-fat diet most of the time.    For hypertension, she does avoid salt in the diet and the blood pressures have been controlled.    Past Medical History:   Diagnosis Date   • Anxiety    • Benign essential hypertension with target blood pressure below 140/90    • Depression    • Generalized anxiety disorder with panic attacks    • Generalized anxiety disorder with panic attacks 1/7/2019   • Major depressive disorder with single episode    • Mixed hyperlipidemia    • Primary insomnia    • Rectal pain 4/2/2019   • Stroke (CMS/[facility]) 2008     "R hemispheric Stroke   • Vertigo    • Vitamin D deficiency        Past Surgical History:   Procedure Laterality Date   • AUGMENTATION MAMMAPLASTY  1990   • BREAST AUGMENTATION Bilateral 1989   • HYSTERECTOMY  1995    age 40   • OOPHORECTOMY     • PARTIAL KNEE ARTHROPLASTY  2015    Partial knee replacement 2 years ago       No family history on file.    Social History     Socioeconomic History   • Marital status:      Spouse name: Not on file   • Number of children: Not on file   • Years of education: Not on file   • Highest education level: Not on file   Tobacco Use   • Smoking status: Never Smoker   • Smokeless tobacco: Never Used   Substance and Sexual Activity   • Alcohol use: No     Frequency: Never   • Drug use: No   • Sexual activity: Defer       No Known Allergies    Review of Systems:     Review of Systems   Constitutional: Positive for chills, fatigue and fever.   HENT: Positive for congestion, postnasal drip and sinus pressure.    Respiratory: Negative for cough, shortness of breath and wheezing.    Cardiovascular: Negative for chest pain, palpitations and leg swelling.   Gastrointestinal: Positive for diarrhea and nausea. Negative for abdominal pain, blood in stool and constipation.   Psychiatric/Behavioral: Positive for sleep disturbance.       Vital Signs  Vitals:    02/14/20 1352   BP: 130/78   BP Location: Left arm   Patient Position: Sitting   Cuff Size: Adult   Pulse: 80   Temp: 100.1 °F (37.8 °C)   TempSrc: Temporal   Weight: 66.2 kg (146 lb)   Height: 160 cm (62.99\")   PainSc: 0-No pain     Body mass index is 25.87 kg/m².      Current Outpatient Medications:   •  clonazePAM (KlonoPIN) 0.5 MG tablet, Take 1 tablet by mouth At Night As Needed for Anxiety., Disp: 30 tablet, Rfl: 2  •  estradiol (CLIMARA) 0.0375 MG/24HR, PLACE 1 PATCH ON THE SKIN AS DIRECTED BY PROVIDER 1 (ONE) TIME PER WEEK., Disp: 4 patch, Rfl: 0  •  temazepam (RESTORIL) 15 MG capsule, TAKE 1-2 CAPSULES BY ORAL ROUTE AT " BEDTIME AS NEEDED, Disp: 60 capsule, Rfl: 2  •  venlafaxine XR (EFFEXOR-XR) 75 MG 24 hr capsule, TAKE ONE CAPSULE BY MOUTH EVERY MORNING, Disp: 30 capsule, Rfl: 5  •  vitamin D (ERGOCALCIFEROL) 99528 units capsule capsule, TAKE ONE CAPSULE BY MOUTH ONCE WEEKLY, Disp: 12 capsule, Rfl: 3    Physical Exam:    Physical Exam   Constitutional: She is oriented to person, place, and time. She appears well-developed and well-nourished.   HENT:   Head: Normocephalic.   Right Ear: Tympanic membrane and ear canal normal.   Left Ear: Tympanic membrane and ear canal normal.   Nose: Mucosal edema and congestion present.   Mouth/Throat: Uvula is midline, oropharynx is clear and moist and mucous membranes are normal.   General irritation in nasal passages.   Eyes: Pupils are equal, round, and reactive to light. Conjunctivae and EOM are normal.   Neck: Normal range of motion. Neck supple. No thyromegaly present.   Cardiovascular: Normal rate, regular rhythm, normal heart sounds and intact distal pulses.   Pulmonary/Chest: Effort normal and breath sounds normal.   Abdominal: Soft. Normal appearance. There is no tenderness.   Musculoskeletal: Normal range of motion. She exhibits no edema.   Lymphadenopathy:     She has no cervical adenopathy.   Neurological: She is alert and oriented to person, place, and time.   Psychiatric: She has a normal mood and affect. Thought content normal.   Nursing note and vitals reviewed.       ACE III MINI        Results Review:    None    CMP:  Lab Results   Component Value Date    BUN 15 03/27/2019    CREATININE 0.72 03/27/2019    EGFRIFNONA 80 03/27/2019    BCR 20.8 03/27/2019     03/27/2019    K 4.3 03/27/2019    CO2 26.0 03/27/2019    CALCIUM 9.2 03/27/2019    ALBUMIN 4.30 03/27/2019    BILITOT 0.6 03/27/2019    ALKPHOS 61 03/27/2019    AST 19 03/27/2019    ALT 18 03/27/2019     HbA1c:  No results found for: HGBA1C  Microalbumin:  Lab Results   Component Value Date    MICROALBUR <3.0 03/27/2019      Lipid Panel  Lab Results   Component Value Date    CHOL 235 (H) 03/27/2019    TRIG 127 03/27/2019    HDL 61 (H) 03/27/2019     (H) 03/27/2019    AST 19 03/27/2019    ALT 18 03/27/2019       Medication Review: Medications reviewed and noted  Patient Instructions   Problem List Items Addressed This Visit        Cardiovascular and Mediastinum    Mixed hyperlipidemia    Overview     2/14/2020 Yi Edouard MD    Continue to improve low-fat and low sugar diet.  Increase exercise and physical activity.  Goal is to get 30 minutes a day 5 days a week.         Mild essential hypertension    Overview     2/14/2020 Yi Edouard MD    Continue lifestyle control of the blood pressure by avoiding salt in the diet and avoiding sodas and getting regular exercise.            Other    Primary insomnia    Overview     2/14/2020 Yi Edouard MD    Continue temazepam as needed.    We discussed sleep hygiene including going to bed at the same time and getting up at the same time every day, going to bed early enough to get 7 or 8 hours in bed, reading and relaxing before bedtime, and avoiding the TV, computer, phone, iPad close to bedtime.           Generalized anxiety disorder    Overview     2/14/2020 Yi Edouard MD    Continue venlafaxine daily and clonazepam in the evenings as needed.  Continue regular exercise and physical activity.  Continue social activities with friends and family.         Relevant Medications    temazepam (RESTORIL) 15 MG capsule    venlafaxine XR (EFFEXOR-XR) 75 MG 24 hr capsule    clonazePAM (KlonoPIN) 0.5 MG tablet    Major depressive disorder with single episode, in partial remission (CMS/HCC)    Overview     2/14/2020 Yi Edouard MD    Continue venlafaxine daily and clonazepam in the evenings as needed.  Continue regular exercise and physical activity.  Continue social activities with friends and family.           Relevant Medications    temazepam (RESTORIL) 15 MG capsule     venlafaxine XR (EFFEXOR-XR) 75 MG 24 hr capsule    Febrile illness, acute - Primary    Overview     2/14/2020 Yi Edouard MD     Symptoms are most likely viral. Just treat the symptoms. Drink plenty of fluids and rest at home.  May return to work on Monday.            Other Visit Diagnoses     Diarrhea, unspecified type        May use over-the-counter medications like Pepto-Bismol and Imodium as needed.  Eat a bland low-fat diet.  Drink plenty of fluids.    Fever and chills        Relevant Orders    POCT Flu A&B, Molecular (Completed)             Diagnosis Plan   1. Febrile illness, acute     2. Diarrhea, unspecified type      May use over-the-counter medications like Pepto-Bismol and Imodium as needed.  Eat a bland low-fat diet.  Drink plenty of fluids.   3. Mixed hyperlipidemia     4. Primary insomnia     5. Generalized anxiety disorder     6. Major depressive disorder with single episode, in partial remission (CMS/HCC)     7. Mild essential hypertension     8. Fever and chills  POCT Flu A&B, Molecular       Plan of care reviewed with patient at the conclusion of today's visit. Education was provided regarding diagnosis, management, and any prescribed or recommended OTC medications.Patient verbalizes understanding of and agreement with management plan.         Yi Edouard MD         Private car

## 2020-03-06 RX ORDER — TAMSULOSIN HYDROCHLORIDE 0.4 MG/1
1 CAPSULE ORAL
Qty: 0 | Refills: 0 | DISCHARGE

## 2020-03-06 RX ORDER — ACETAZOLAMIDE 250 MG/1
1 TABLET ORAL
Qty: 0 | Refills: 0 | DISCHARGE

## 2020-03-06 RX ORDER — OLOPATADINE HYDROCHLORIDE 1 MG/ML
1 SOLUTION/ DROPS OPHTHALMIC
Qty: 0 | Refills: 0 | DISCHARGE

## 2020-03-06 RX ORDER — MONTELUKAST 4 MG/1
1 TABLET, CHEWABLE ORAL
Qty: 0 | Refills: 0 | DISCHARGE

## 2020-03-06 RX ORDER — METFORMIN HYDROCHLORIDE 850 MG/1
1 TABLET ORAL
Qty: 0 | Refills: 0 | DISCHARGE

## 2020-03-06 RX ORDER — CILOSTAZOL 100 MG/1
1 TABLET ORAL
Qty: 0 | Refills: 0 | DISCHARGE

## 2020-03-06 RX ORDER — SIMVASTATIN 20 MG/1
1 TABLET, FILM COATED ORAL
Qty: 0 | Refills: 0 | DISCHARGE

## 2020-03-06 RX ORDER — LATANOPROST 0.05 MG/ML
1 SOLUTION/ DROPS OPHTHALMIC; TOPICAL
Qty: 0 | Refills: 0 | DISCHARGE

## 2020-03-06 RX ORDER — ICOSAPENT ETHYL 500 MG/1
2 CAPSULE, LIQUID FILLED ORAL
Qty: 0 | Refills: 0 | DISCHARGE

## 2020-07-10 ENCOUNTER — APPOINTMENT (OUTPATIENT)
Age: 80
End: 2020-07-10
Payer: MEDICARE

## 2020-07-10 VITALS
OXYGEN SATURATION: 98 % | RESPIRATION RATE: 16 BRPM | HEART RATE: 64 BPM | WEIGHT: 173 LBS | BODY MASS INDEX: 29.53 KG/M2 | TEMPERATURE: 97.9 F | SYSTOLIC BLOOD PRESSURE: 148 MMHG | DIASTOLIC BLOOD PRESSURE: 76 MMHG | HEIGHT: 64 IN

## 2020-07-10 PROCEDURE — 99213 OFFICE O/P EST LOW 20 MIN: CPT

## 2020-07-10 NOTE — PHYSICAL EXAM
[General Appearance - Well Nourished] : well nourished [General Appearance - Well Developed] : well developed [Normal Appearance] : normal appearance [Well Groomed] : well groomed [General Appearance - In No Acute Distress] : no acute distress [Abdomen Tenderness] : non-tender [Costovertebral Angle Tenderness] : no ~M costovertebral angle tenderness [Abdomen Soft] : soft [Urethral Meatus] : meatus normal [Penis Abnormality] : normal circumcised penis [Urinary Bladder Findings] : the bladder was normal on palpation [Epididymis] : the epididymides were normal [Scrotum] : the scrotum was normal [Testes Tenderness] : no tenderness of the testes [Testes Mass (___cm)] : there were no testicular masses [Prostate Tenderness] : the prostate was not tender [No Prostate Nodules] : no prostate nodules [Prostate Size ___ gm] : prostate size [unfilled] gm [Edema] : no peripheral edema [Respiration, Rhythm And Depth] : normal respiratory rhythm and effort [Exaggerated Use Of Accessory Muscles For Inspiration] : no accessory muscle use [] : no respiratory distress [Mood] : the mood was normal [Affect] : the affect was normal [Oriented To Time, Place, And Person] : oriented to person, place, and time [Not Anxious] : not anxious [Normal Station and Gait] : the gait and station were normal for the patient's age [No Focal Deficits] : no focal deficits [No Palpable Adenopathy] : no palpable adenopathy

## 2020-07-10 NOTE — ASSESSMENT
[FreeTextEntry1] : 80 yo M with BPH\par \par - PVR = 0ml\par - Continue tamsulosin and finasteride\par - Reaffirmed behavioral modifications\par - FU in 6 months\par

## 2020-07-10 NOTE — HISTORY OF PRESENT ILLNESS
[FreeTextEntry1] : 79 yo Armenian speaking M \par 2 days ago, noticed some weak stream and then suddenly gross hematuria\par Went to ER yesterday and had catheter placed\par Prior to this states that he was voiding fine (every 2 hours)\par nocturia 2/night, weak stream and some straining\par no incontinence\par no prior history of gross hematuria\par Had one episode of urinary retention many years ago per pt\par On myrbetriq and tamsulosin for many years \par Normal bowel movements\par \par 8/16/19 Interval history: Had cysto at last visit which showed BPH.\par Pt was able to void after cysto \par Since then, pt states that he feels like he is voiding almost to completion but has been having some bothersome urinary spraying\par No recurrence of gross hematuria, no fever\par Still having some intermittent right flank pain. Renal US was negative for hydro or stones\par \par 9/20/19 Interval history: Has been doing well since last visit\par Only complaint is stream is diverted to the left and causes a mess everytime he urinates\par \par 1/10/20 Interval history: Ran out of BPH meds and went back to his previous urologist (Dr. Dumont) for refills\par Was told that he had a UTI and currently on Cipro\par Otherwise, feels like he is voiding well with no issues\par \par 7/10/20 Interval history: Doing well with no issues\par No gross hematuria, dysuria\par

## 2020-07-13 PROBLEM — I10 ESSENTIAL (PRIMARY) HYPERTENSION: Chronic | Status: ACTIVE | Noted: 2019-07-25

## 2020-07-13 PROBLEM — E11.9 TYPE 2 DIABETES MELLITUS WITHOUT COMPLICATIONS: Chronic | Status: ACTIVE | Noted: 2019-07-25

## 2020-08-07 ENCOUNTER — APPOINTMENT (OUTPATIENT)
Age: 80
End: 2020-08-07

## 2020-12-23 PROBLEM — N39.0 ACUTE UTI: Status: RESOLVED | Noted: 2020-01-10 | Resolved: 2020-12-23

## 2020-12-30 RX ORDER — TAMSULOSIN HYDROCHLORIDE 0.4 MG/1
0.4 CAPSULE ORAL
Qty: 90 | Refills: 3 | Status: ACTIVE | COMMUNITY
Start: 2019-08-16 | End: 1900-01-01

## 2021-01-15 ENCOUNTER — APPOINTMENT (OUTPATIENT)
Age: 81
End: 2021-01-15
Payer: MEDICARE

## 2021-01-15 VITALS — TEMPERATURE: 98.3 F

## 2021-01-15 PROCEDURE — 99072 ADDL SUPL MATRL&STAF TM PHE: CPT

## 2021-01-15 PROCEDURE — 99213 OFFICE O/P EST LOW 20 MIN: CPT

## 2021-01-18 LAB
APPEARANCE: CLEAR
BILIRUBIN URINE: NEGATIVE
BLOOD URINE: NEGATIVE
COLOR: NORMAL
GLUCOSE QUALITATIVE U: ABNORMAL
KETONES URINE: NEGATIVE
LEUKOCYTE ESTERASE URINE: NEGATIVE
NITRITE URINE: NEGATIVE
PH URINE: 6.5
PROTEIN URINE: NEGATIVE
SPECIFIC GRAVITY URINE: 1.01
UROBILINOGEN URINE: NORMAL

## 2021-01-18 NOTE — PHYSICAL EXAM
[General Appearance - Well Nourished] : well nourished [General Appearance - Well Developed] : well developed [Normal Appearance] : normal appearance [Well Groomed] : well groomed [General Appearance - In No Acute Distress] : no acute distress [Abdomen Tenderness] : non-tender [Abdomen Soft] : soft [Costovertebral Angle Tenderness] : no ~M costovertebral angle tenderness [Urethral Meatus] : meatus normal [Urinary Bladder Findings] : the bladder was normal on palpation [Penis Abnormality] : normal circumcised penis [Scrotum] : the scrotum was normal [Epididymis] : the epididymides were normal [Testes Tenderness] : no tenderness of the testes [Testes Mass (___cm)] : there were no testicular masses [Prostate Tenderness] : the prostate was not tender [No Prostate Nodules] : no prostate nodules [Prostate Size ___ gm] : prostate size [unfilled] gm [Edema] : no peripheral edema [] : no respiratory distress [Respiration, Rhythm And Depth] : normal respiratory rhythm and effort [Exaggerated Use Of Accessory Muscles For Inspiration] : no accessory muscle use [Oriented To Time, Place, And Person] : oriented to person, place, and time [Affect] : the affect was normal [Mood] : the mood was normal [Not Anxious] : not anxious [Normal Station and Gait] : the gait and station were normal for the patient's age [No Focal Deficits] : no focal deficits [No Palpable Adenopathy] : no palpable adenopathy

## 2021-01-18 NOTE — ASSESSMENT
[FreeTextEntry1] : 80 yo M with BPH\par \par - PVR = 0ml\par - UA\par - Continue tamsulosin and finasteride\par - Reaffirmed behavioral modifications\par - FU in 6 months\par

## 2021-01-18 NOTE — HISTORY OF PRESENT ILLNESS
[FreeTextEntry1] : 77 yo Chinese speaking M \par 2 days ago, noticed some weak stream and then suddenly gross hematuria\par Went to ER yesterday and had catheter placed\par Prior to this states that he was voiding fine (every 2 hours)\par nocturia 2/night, weak stream and some straining\par no incontinence\par no prior history of gross hematuria\par Had one episode of urinary retention many years ago per pt\par On myrbetriq and tamsulosin for many years \par Normal bowel movements\par \par 8/16/19 Interval history: Had cysto at last visit which showed BPH.\par Pt was able to void after cysto \par Since then, pt states that he feels like he is voiding almost to completion but has been having some bothersome urinary spraying\par No recurrence of gross hematuria, no fever\par Still having some intermittent right flank pain. Renal US was negative for hydro or stones\par \par 9/20/19 Interval history: Has been doing well since last visit\par Only complaint is stream is diverted to the left and causes a mess everytime he urinates\par \par 1/10/20 Interval history: Ran out of BPH meds and went back to his previous urologist (Dr. Dumont) for refills\par Was told that he had a UTI and currently on Cipro\par Otherwise, feels like he is voiding well with no issues\par \par 7/10/20 Interval history: Doing well with no issues\par No gross hematuria, dysuria\par \par 1/15/21 Interval history: No issues since last visit\par No gross hematuria, dysuria\par Has been taking BPH meds consistently\par

## 2021-06-09 ENCOUNTER — APPOINTMENT (OUTPATIENT)
Dept: UROLOGY | Facility: CLINIC | Age: 81
End: 2021-06-09
Payer: MEDICARE

## 2021-06-09 VITALS — TEMPERATURE: 98.3 F

## 2021-06-09 PROCEDURE — 99214 OFFICE O/P EST MOD 30 MIN: CPT

## 2021-06-09 PROCEDURE — 99072 ADDL SUPL MATRL&STAF TM PHE: CPT

## 2021-06-11 ENCOUNTER — APPOINTMENT (OUTPATIENT)
Age: 81
End: 2021-06-11

## 2021-06-13 LAB
APPEARANCE: ABNORMAL
BACTERIA UR CULT: NORMAL
BACTERIA: NEGATIVE
BILIRUBIN URINE: NEGATIVE
BLOOD URINE: NEGATIVE
BUN SERPL-MCNC: 11 MG/DL
COLOR: NORMAL
CREAT SERPL-MCNC: 0.99 MG/DL
GLUCOSE QUALITATIVE U: ABNORMAL
HYALINE CASTS: 0 /LPF
KETONES URINE: NEGATIVE
LEUKOCYTE ESTERASE URINE: NEGATIVE
MICROSCOPIC-UA: NORMAL
NITRITE URINE: NEGATIVE
PH URINE: 8
PROTEIN URINE: NEGATIVE
RED BLOOD CELLS URINE: 0 /HPF
SPECIFIC GRAVITY URINE: 1.01
SQUAMOUS EPITHELIAL CELLS: 0 /HPF
URINE CYTOLOGY: NORMAL
UROBILINOGEN URINE: NORMAL
WHITE BLOOD CELLS URINE: 0 /HPF

## 2021-06-13 NOTE — PHYSICAL EXAM
[General Appearance - Well Developed] : well developed [General Appearance - Well Nourished] : well nourished [Normal Appearance] : normal appearance [Well Groomed] : well groomed [General Appearance - In No Acute Distress] : no acute distress [Abdomen Soft] : soft [Abdomen Tenderness] : non-tender [Costovertebral Angle Tenderness] : no ~M costovertebral angle tenderness [Urethral Meatus] : meatus normal [Penis Abnormality] : normal circumcised penis [Urinary Bladder Findings] : the bladder was normal on palpation [Scrotum] : the scrotum was normal [Epididymis] : the epididymides were normal [Testes Mass (___cm)] : there were no testicular masses [Testes Tenderness] : no tenderness of the testes [No Prostate Nodules] : no prostate nodules [Prostate Tenderness] : the prostate was not tender [Prostate Size ___ gm] : prostate size [unfilled] gm [Edema] : no peripheral edema [] : no respiratory distress [Respiration, Rhythm And Depth] : normal respiratory rhythm and effort [Exaggerated Use Of Accessory Muscles For Inspiration] : no accessory muscle use [Oriented To Time, Place, And Person] : oriented to person, place, and time [Affect] : the affect was normal [Mood] : the mood was normal [Not Anxious] : not anxious [Normal Station and Gait] : the gait and station were normal for the patient's age [No Focal Deficits] : no focal deficits [No Palpable Adenopathy] : no palpable adenopathy

## 2021-06-13 NOTE — ASSESSMENT
[FreeTextEntry1] : 81 yo M with gross hematuria\par \par - PVR = 0ml\par - UA, culture, cytology\par - Discussed possible etiologies for hematuria including benign (UTI, nephrolithiasis, cyst, BPH) vs malignancy (renal, ureteral and bladder). Discussed hematuria workup which includes upper tract imaging such as US or CT urogram and cystoscopy. Discussed risk and benefits of cystoscopy.\par

## 2021-06-13 NOTE — HISTORY OF PRESENT ILLNESS
[FreeTextEntry1] : 77 yo Ukrainian speaking M \par 2 days ago, noticed some weak stream and then suddenly gross hematuria\par Went to ER yesterday and had catheter placed\par Prior to this states that he was voiding fine (every 2 hours)\par nocturia 2/night, weak stream and some straining\par no incontinence\par no prior history of gross hematuria\par Had one episode of urinary retention many years ago per pt\par On myrbetriq and tamsulosin for many years \par Normal bowel movements\par \par 8/16/19 Interval history: Had cysto at last visit which showed BPH.\par Pt was able to void after cysto \par Since then, pt states that he feels like he is voiding almost to completion but has been having some bothersome urinary spraying\par No recurrence of gross hematuria, no fever\par Still having some intermittent right flank pain. Renal US was negative for hydro or stones\par \par 9/20/19 Interval history: Has been doing well since last visit\par Only complaint is stream is diverted to the left and causes a mess everytime he urinates\par \par 1/10/20 Interval history: Ran out of BPH meds and went back to his previous urologist (Dr. Dumont) for refills\par Was told that he had a UTI and currently on Cipro\par Otherwise, feels like he is voiding well with no issues\par \par 7/10/20 Interval history: Doing well with no issues\par No gross hematuria, dysuria\par \par 1/15/21 Interval history: No issues since last visit\par No gross hematuria, dysuria\par Has been taking BPH meds consistently\par \par 6/9/21 Interval history: Two episodes of gross hematuria\par No flank pain\par No fever, chills

## 2021-06-16 ENCOUNTER — APPOINTMENT (OUTPATIENT)
Dept: UROLOGY | Facility: CLINIC | Age: 81
End: 2021-06-16
Payer: MEDICARE

## 2021-06-16 VITALS
OXYGEN SATURATION: 97 % | TEMPERATURE: 97.2 F | DIASTOLIC BLOOD PRESSURE: 82 MMHG | HEART RATE: 70 BPM | SYSTOLIC BLOOD PRESSURE: 158 MMHG

## 2021-06-16 PROCEDURE — 99072 ADDL SUPL MATRL&STAF TM PHE: CPT

## 2021-06-16 PROCEDURE — 52000 CYSTOURETHROSCOPY: CPT

## 2021-07-16 ENCOUNTER — APPOINTMENT (OUTPATIENT)
Age: 81
End: 2021-07-16

## 2021-10-05 NOTE — ED PROVIDER NOTE - ABDOMINAL EXAM
Impression: S/P Posterior Vitrectomy; MP; OIL OD - 1 Day. Encounter for surgical aftercare following surgery on a sense organ  Z48.810. Plan: RTC  as scheduled for post op or ASAP should they experience a decrease in vision, pain, or any worsening of condition.  Initiate Prednisolone and Ofloxacin as directed no pulsating masses/soft/nondistended/no organomegaly/tender...

## 2021-11-10 NOTE — ED ADULT NURSE NOTE - NSSUSCREENINGQ4_ED_ALL_ED
No Detail Level: Detailed Quality 226: Preventive Care And Screening: Tobacco Use: Screening And Cessation Intervention: Patient screened for tobacco use and is an ex/non-smoker

## 2021-12-14 ENCOUNTER — RX RENEWAL (OUTPATIENT)
Age: 81
End: 2021-12-14

## 2022-09-08 NOTE — ED ADULT NURSE NOTE - PAIN RATING/NUMBER SCALE (0-10): ACTIVITY
PAST MEDICAL HISTORY:  CVA (cerebral vascular accident)     ETOH abuse     Gout     HLD (hyperlipidemia)     HTN (hypertension)     MI (myocardial infarction)     Personal history of asbestosis     Tracheobronchomalacia     UTI (lower urinary tract infection)      4

## 2022-09-25 ENCOUNTER — INPATIENT (INPATIENT)
Facility: HOSPITAL | Age: 82
LOS: 0 days | Discharge: HOME | DRG: 69 | End: 2022-09-26
Attending: STUDENT IN AN ORGANIZED HEALTH CARE EDUCATION/TRAINING PROGRAM | Admitting: STUDENT IN AN ORGANIZED HEALTH CARE EDUCATION/TRAINING PROGRAM
Payer: MEDICARE

## 2022-09-25 VITALS
HEART RATE: 73 BPM | HEIGHT: 70 IN | RESPIRATION RATE: 18 BRPM | OXYGEN SATURATION: 95 % | DIASTOLIC BLOOD PRESSURE: 84 MMHG | TEMPERATURE: 98 F | WEIGHT: 175.05 LBS | SYSTOLIC BLOOD PRESSURE: 148 MMHG

## 2022-09-25 DIAGNOSIS — E78.5 HYPERLIPIDEMIA, UNSPECIFIED: ICD-10-CM

## 2022-09-25 DIAGNOSIS — G45.9 TRANSIENT CEREBRAL ISCHEMIC ATTACK, UNSPECIFIED: ICD-10-CM

## 2022-09-25 DIAGNOSIS — I10 ESSENTIAL (PRIMARY) HYPERTENSION: ICD-10-CM

## 2022-09-25 DIAGNOSIS — Z29.9 ENCOUNTER FOR PROPHYLACTIC MEASURES, UNSPECIFIED: ICD-10-CM

## 2022-09-25 DIAGNOSIS — N40.0 BENIGN PROSTATIC HYPERPLASIA WITHOUT LOWER URINARY TRACT SYMPTOMS: ICD-10-CM

## 2022-09-25 DIAGNOSIS — K21.9 GASTRO-ESOPHAGEAL REFLUX DISEASE WITHOUT ESOPHAGITIS: ICD-10-CM

## 2022-09-25 DIAGNOSIS — E11.9 TYPE 2 DIABETES MELLITUS WITHOUT COMPLICATIONS: ICD-10-CM

## 2022-09-25 LAB
A1C WITH ESTIMATED AVERAGE GLUCOSE RESULT: 7.5 % — HIGH (ref 4–5.6)
ALBUMIN SERPL ELPH-MCNC: 3.7 G/DL — SIGNIFICANT CHANGE UP (ref 3.3–5)
ALP SERPL-CCNC: 80 U/L — SIGNIFICANT CHANGE UP (ref 40–120)
ALT FLD-CCNC: 50 U/L — SIGNIFICANT CHANGE UP (ref 12–78)
ANION GAP SERPL CALC-SCNC: 5 MMOL/L — SIGNIFICANT CHANGE UP (ref 5–17)
APTT BLD: 32 SEC — SIGNIFICANT CHANGE UP (ref 27.5–35.5)
AST SERPL-CCNC: 25 U/L — SIGNIFICANT CHANGE UP (ref 15–37)
BASOPHILS # BLD AUTO: 0.05 K/UL — SIGNIFICANT CHANGE UP (ref 0–0.2)
BASOPHILS NFR BLD AUTO: 0.6 % — SIGNIFICANT CHANGE UP (ref 0–2)
BILIRUB SERPL-MCNC: 0.5 MG/DL — SIGNIFICANT CHANGE UP (ref 0.2–1.2)
BUN SERPL-MCNC: 13 MG/DL — SIGNIFICANT CHANGE UP (ref 7–23)
CALCIUM SERPL-MCNC: 9.1 MG/DL — SIGNIFICANT CHANGE UP (ref 8.5–10.1)
CHLORIDE SERPL-SCNC: 105 MMOL/L — SIGNIFICANT CHANGE UP (ref 96–108)
CO2 SERPL-SCNC: 24 MMOL/L — SIGNIFICANT CHANGE UP (ref 22–31)
CREAT SERPL-MCNC: 1 MG/DL — SIGNIFICANT CHANGE UP (ref 0.5–1.3)
EGFR: 74 ML/MIN/1.73M2 — SIGNIFICANT CHANGE UP
EOSINOPHIL # BLD AUTO: 0.12 K/UL — SIGNIFICANT CHANGE UP (ref 0–0.5)
EOSINOPHIL NFR BLD AUTO: 1.5 % — SIGNIFICANT CHANGE UP (ref 0–6)
ESTIMATED AVERAGE GLUCOSE: 169 MG/DL — HIGH (ref 68–114)
GLUCOSE SERPL-MCNC: 126 MG/DL — HIGH (ref 70–99)
HCT VFR BLD CALC: 39.4 % — SIGNIFICANT CHANGE UP (ref 39–50)
HGB BLD-MCNC: 13.3 G/DL — SIGNIFICANT CHANGE UP (ref 13–17)
IMM GRANULOCYTES NFR BLD AUTO: 1.8 % — HIGH (ref 0–0.9)
INR BLD: 0.96 RATIO — SIGNIFICANT CHANGE UP (ref 0.88–1.16)
LYMPHOCYTES # BLD AUTO: 2.41 K/UL — SIGNIFICANT CHANGE UP (ref 1–3.3)
LYMPHOCYTES # BLD AUTO: 29.2 % — SIGNIFICANT CHANGE UP (ref 13–44)
MCHC RBC-ENTMCNC: 32.4 PG — SIGNIFICANT CHANGE UP (ref 27–34)
MCHC RBC-ENTMCNC: 33.8 GM/DL — SIGNIFICANT CHANGE UP (ref 32–36)
MCV RBC AUTO: 96.1 FL — SIGNIFICANT CHANGE UP (ref 80–100)
MONOCYTES # BLD AUTO: 0.96 K/UL — HIGH (ref 0–0.9)
MONOCYTES NFR BLD AUTO: 11.6 % — SIGNIFICANT CHANGE UP (ref 2–14)
NEUTROPHILS # BLD AUTO: 4.56 K/UL — SIGNIFICANT CHANGE UP (ref 1.8–7.4)
NEUTROPHILS NFR BLD AUTO: 55.3 % — SIGNIFICANT CHANGE UP (ref 43–77)
NRBC # BLD: 0 /100 WBCS — SIGNIFICANT CHANGE UP (ref 0–0)
PLATELET # BLD AUTO: 261 K/UL — SIGNIFICANT CHANGE UP (ref 150–400)
POTASSIUM SERPL-MCNC: 4.8 MMOL/L — SIGNIFICANT CHANGE UP (ref 3.5–5.3)
POTASSIUM SERPL-SCNC: 4.8 MMOL/L — SIGNIFICANT CHANGE UP (ref 3.5–5.3)
PROT SERPL-MCNC: 7.2 G/DL — SIGNIFICANT CHANGE UP (ref 6–8.3)
PROTHROM AB SERPL-ACNC: 11.2 SEC — SIGNIFICANT CHANGE UP (ref 10.5–13.4)
RBC # BLD: 4.1 M/UL — LOW (ref 4.2–5.8)
RBC # FLD: 12.9 % — SIGNIFICANT CHANGE UP (ref 10.3–14.5)
SARS-COV-2 RNA SPEC QL NAA+PROBE: SIGNIFICANT CHANGE UP
SODIUM SERPL-SCNC: 134 MMOL/L — LOW (ref 135–145)
TROPONIN I, HIGH SENSITIVITY RESULT: 6.6 NG/L — SIGNIFICANT CHANGE UP
WBC # BLD: 8.25 K/UL — SIGNIFICANT CHANGE UP (ref 3.8–10.5)
WBC # FLD AUTO: 8.25 K/UL — SIGNIFICANT CHANGE UP (ref 3.8–10.5)

## 2022-09-25 PROCEDURE — 70450 CT HEAD/BRAIN W/O DYE: CPT | Mod: 26,MA

## 2022-09-25 PROCEDURE — 99291 CRITICAL CARE FIRST HOUR: CPT

## 2022-09-25 PROCEDURE — 70496 CT ANGIOGRAPHY HEAD: CPT | Mod: 26,MA

## 2022-09-25 PROCEDURE — 0042T: CPT | Mod: MA

## 2022-09-25 PROCEDURE — 93010 ELECTROCARDIOGRAM REPORT: CPT

## 2022-09-25 PROCEDURE — 99233 SBSQ HOSP IP/OBS HIGH 50: CPT | Mod: GC

## 2022-09-25 PROCEDURE — 70498 CT ANGIOGRAPHY NECK: CPT | Mod: 26,MA

## 2022-09-25 PROCEDURE — 99223 1ST HOSP IP/OBS HIGH 75: CPT

## 2022-09-25 RX ORDER — DEXTROSE 50 % IN WATER 50 %
25 SYRINGE (ML) INTRAVENOUS ONCE
Refills: 0 | Status: DISCONTINUED | OUTPATIENT
Start: 2022-09-25 | End: 2022-09-26

## 2022-09-25 RX ORDER — TAMSULOSIN HYDROCHLORIDE 0.4 MG/1
0.4 CAPSULE ORAL AT BEDTIME
Refills: 0 | Status: DISCONTINUED | OUTPATIENT
Start: 2022-09-25 | End: 2022-09-26

## 2022-09-25 RX ORDER — ASPIRIN/CALCIUM CARB/MAGNESIUM 324 MG
325 TABLET ORAL ONCE
Refills: 0 | Status: COMPLETED | OUTPATIENT
Start: 2022-09-25 | End: 2022-09-25

## 2022-09-25 RX ORDER — ASPIRIN/CALCIUM CARB/MAGNESIUM 324 MG
81 TABLET ORAL DAILY
Refills: 0 | Status: DISCONTINUED | OUTPATIENT
Start: 2022-09-25 | End: 2022-09-26

## 2022-09-25 RX ORDER — SODIUM CHLORIDE 9 MG/ML
1000 INJECTION, SOLUTION INTRAVENOUS
Refills: 0 | Status: DISCONTINUED | OUTPATIENT
Start: 2022-09-25 | End: 2022-09-26

## 2022-09-25 RX ORDER — PANTOPRAZOLE SODIUM 20 MG/1
40 TABLET, DELAYED RELEASE ORAL
Refills: 0 | Status: DISCONTINUED | OUTPATIENT
Start: 2022-09-25 | End: 2022-09-26

## 2022-09-25 RX ORDER — DEXTROSE 50 % IN WATER 50 %
12.5 SYRINGE (ML) INTRAVENOUS ONCE
Refills: 0 | Status: DISCONTINUED | OUTPATIENT
Start: 2022-09-25 | End: 2022-09-26

## 2022-09-25 RX ORDER — ATORVASTATIN CALCIUM 80 MG/1
80 TABLET, FILM COATED ORAL AT BEDTIME
Refills: 0 | Status: DISCONTINUED | OUTPATIENT
Start: 2022-09-25 | End: 2022-09-26

## 2022-09-25 RX ORDER — DEXTROSE 50 % IN WATER 50 %
15 SYRINGE (ML) INTRAVENOUS ONCE
Refills: 0 | Status: DISCONTINUED | OUTPATIENT
Start: 2022-09-25 | End: 2022-09-26

## 2022-09-25 RX ORDER — INSULIN LISPRO 100/ML
VIAL (ML) SUBCUTANEOUS AT BEDTIME
Refills: 0 | Status: DISCONTINUED | OUTPATIENT
Start: 2022-09-25 | End: 2022-09-26

## 2022-09-25 RX ORDER — LATANOPROST 0.05 MG/ML
1 SOLUTION/ DROPS OPHTHALMIC; TOPICAL AT BEDTIME
Refills: 0 | Status: DISCONTINUED | OUTPATIENT
Start: 2022-09-25 | End: 2022-09-26

## 2022-09-25 RX ORDER — GLUCAGON INJECTION, SOLUTION 0.5 MG/.1ML
1 INJECTION, SOLUTION SUBCUTANEOUS ONCE
Refills: 0 | Status: DISCONTINUED | OUTPATIENT
Start: 2022-09-25 | End: 2022-09-26

## 2022-09-25 RX ORDER — MONTELUKAST 4 MG/1
10 TABLET, CHEWABLE ORAL DAILY
Refills: 0 | Status: DISCONTINUED | OUTPATIENT
Start: 2022-09-25 | End: 2022-09-26

## 2022-09-25 RX ORDER — LABETALOL HCL 100 MG
10 TABLET ORAL ONCE
Refills: 0 | Status: COMPLETED | OUTPATIENT
Start: 2022-09-25 | End: 2022-09-25

## 2022-09-25 RX ORDER — ENOXAPARIN SODIUM 100 MG/ML
40 INJECTION SUBCUTANEOUS EVERY 24 HOURS
Refills: 0 | Status: DISCONTINUED | OUTPATIENT
Start: 2022-09-25 | End: 2022-09-26

## 2022-09-25 RX ORDER — INSULIN LISPRO 100/ML
VIAL (ML) SUBCUTANEOUS
Refills: 0 | Status: DISCONTINUED | OUTPATIENT
Start: 2022-09-25 | End: 2022-09-26

## 2022-09-25 RX ADMIN — Medication 325 MILLIGRAM(S): at 21:50

## 2022-09-25 RX ADMIN — ENOXAPARIN SODIUM 40 MILLIGRAM(S): 100 INJECTION SUBCUTANEOUS at 22:41

## 2022-09-25 RX ADMIN — ATORVASTATIN CALCIUM 80 MILLIGRAM(S): 80 TABLET, FILM COATED ORAL at 22:41

## 2022-09-25 NOTE — ED PROVIDER NOTE - NSICDXPASTMEDICALHX_GEN_ALL_CORE_FT
PAST MEDICAL HISTORY:  Asthma     Benign prostatic hyperplasia     Diabetes     Hypercholesteremia     Hypertension

## 2022-09-25 NOTE — H&P ADULT - NSHPPHYSICALEXAM_GEN_ALL_CORE
T(C): 36.9 (09-25-22 @ 17:26), Max: 36.9 (09-25-22 @ 17:26)  HR: 62 (09-25-22 @ 21:00) (59 - 81)  BP: 163/80 (09-25-22 @ 21:00) (148/84 - 183/88)  RR: 18 (09-25-22 @ 21:00) (17 - 18)  SpO2: 98% (09-25-22 @ 21:00) (95% - 98%)    GENERAL: patient appears well, no acute distress, appropriate, pleasant  EYES: sclera clear, no exudates  ENMT: oropharynx clear without erythema, no exudates, moist mucous membranes  NECK: supple, soft, no thyromegaly noted  LUNGS: good air entry bilaterally, clear to auscultation, symmetric breath sounds, no wheezing or rhonchi appreciated  HEART: soft S1/S2, regular rate and rhythm, no murmurs noted, no lower extremity edema  GASTROINTESTINAL: abdomen is soft, nontender, nondistended, normoactive bowel sounds, no palpable masses  INTEGUMENT: good skin turgor, no lesions noted  MUSCULOSKELETAL: no clubbing or cyanosis, no obvious deformity  NEUROLOGIC: awake, alert, oriented x3, good muscle tone in 4 extremities, no obvious sensory deficits  PSYCHIATRIC: mood is good, affect is congruent, linear and logical thought process  HEME/LYMPH: no palpable supraclavicular nodules, no obvious ecchymosis or petechiae

## 2022-09-25 NOTE — ED ADULT NURSE NOTE - HOW OFTEN DO YOU HAVE A DRINK CONTAINING ALCOHOL?
Pt has OBV tomorrow with Dr. Montoya/JESSIE also.  Let me know if you would like induction set up or would prefer to discuss with pt tomorrow.   Never

## 2022-09-25 NOTE — ED ADULT NURSE NOTE - CHIEF COMPLAINT QUOTE
Patient BIBA from home for syncopal episode. Patient is Setswana speaking, son at bedside to translate. Per son, when patient came to consciousness he was initially not making sense.  in field. No head strike.

## 2022-09-25 NOTE — H&P ADULT - PROBLEM SELECTOR PLAN 1
-Patient presents with hx and examination consistent with TIA  -Admit to telemetry   -CT head - CT PERFUSION: No core infarct or penumbra of ischemic tissue is   identified by CT perfusion.  CT ANGIOGRAPHY NECK: Patent cervical vasculature. No hemodynamically   significant carotid stenosis by NASCET criteria or flow-limiting   vertebral artery stenosis.  No evidence of dissection.  CT ANGIOGRAPHY BRAIN: No vessel occlusion, flow-limiting stenosis or   aneurysm.  -Goal -180, keep diastolic <105  -Neuro consult in AM   -Will need TTE, MRA/MRI head/neck  -Aspiration, seizure, fall precaution  -neuro checks Q4  -Would recommend strict NPO & IVF until passes bedside dysphagia screen or is evaluated with formal speech and swallow.   -PT and OT consultation  -Check A1c, lipid profile, TSH for further risk stratification  -start high dose statin after dysphagia screen  -start ASA when cleared for PO diet.

## 2022-09-25 NOTE — H&P ADULT - NSHPSOCIALHISTORY_GEN_ALL_CORE
lives with son   independent with adls  former smoker, quit 4 years ago. states smoked 10 cigarettes a day for "years", unable to quantify number of years   social etoh   denies drug use

## 2022-09-25 NOTE — ED ADULT NURSE NOTE - OBJECTIVE STATEMENT
Pt brought in by ambulance with the c/o as per son pt was experiencing slurred speech, headache, b/l eye pain, difficulty standing 1 hour prior to arrival. Also pt synopsize x 2. As ER MD's orders pt made a stroke protocol. As per Md's orders Osvaldo placed blood specimen obtained sent to the lab. Pt stable and nursing care ongoing and safety maintained.

## 2022-09-25 NOTE — H&P ADULT - ASSESSMENT
82 y m PMHx diabetes type 2, bph, hld, htn asthma presents with complaint of slurred speech, headache, bl eye pain,, dizziness, and difficulty standing. Admit with TIA.

## 2022-09-25 NOTE — ED PROVIDER NOTE - PROGRESS NOTE DETAILS
case erika Gonzalez, [place] All results were explained to patient and/or family and a copy of all available results given.

## 2022-09-25 NOTE — H&P ADULT - HISTORY OF PRESENT ILLNESS
82 y m PMHx diabetes type 2, bph, hld, htn asthma presents with complaint of slurred speech, headache, bl eye pain,, dizziness, and difficulty standing. Patient and son state symptoms started 1 hour prior to arrival and lasted approximately 15 minutes.  Translated by son and RN granddaughter Flavia.  Slurred speech, headache lasted about 15 minutes.  No sob, cp, n/v, dizziness, weakness or numbness.  pmd- in [place].  HO asthma, dm, htn, hypercholesteremia, bph.  Mld dizziness while ambulating in the ED.  No other complaints.   82 y m PMHx diabetes type 2, bph, hld, htn asthma presents with complaint of slurred speech, headache, bl eye pain,, dizziness, and difficulty standing. Patient and son state symptoms started 1 hour prior to arrival and lasted approximately 15 minutes.  Per patient he states he had trouble speaking and states it did not last "very long at all". States he is now symptom free. Denies pervious similar episodes.  No sob, cp, n/v, dizziness, weakness or numbness.      In the ed  VS: T 98.5, HR 73, /84, RR 18, SpO2 95 on RA  Labs: Na 134, Glucose 126    CT PERFUSION: No core infarct or penumbra of ischemic tissue is   identified by CT perfusion.    CT ANGIOGRAPHY NECK: Patent cervical vasculature. No hemodynamically   significant carotid stenosis by NASCET criteria or flow-limiting   vertebral artery stenosis.  No evidence of dissection.    CT ANGIOGRAPHY BRAIN: No vessel occlusion, flow-limiting stenosis or   aneurysm.    NASCET CAROTID STENOSIS CRITERIA (distal normal appearing ICA as   denominator for measurement): 0%-none, 1-49%-mild, 50-70%-moderate,   70-89%-severe, 90-99%-critical.  EKG NSR  Given asa 325mg

## 2022-09-25 NOTE — ED PROVIDER NOTE - OBJECTIVE STATEMENT
Slurred speech, headache, bl eye pain, difficulty standing 1 hour ago.  Translated by son and RN granddaughter Flavia.  Slurred speech, headache lasted about 15 minutes.  No sob, cp, n/v, dizziness, weakness or numbness.  pmd- in [place].  HO asthma, dm, htn, hypercholesteremia, bph. Slurred speech, headache, bl eye pain,, dizziness, difficulty standing x 1 hour ago.  Translated by son and RN granddaughter Flavia.  Slurred speech, headache lasted about 15 minutes.  No sob, cp, n/v, dizziness, weakness or numbness.  pmd- in [place].  HO asthma, dm, htn, hypercholesteremia, bph.  Mild dizziness while ambulating in the ED.  No other complaints.

## 2022-09-25 NOTE — ED PROVIDER NOTE - CLINICAL SUMMARY MEDICAL DECISION MAKING FREE TEXT BOX
Slurred speech, headache, bl eye pain,, dizziness, difficulty standing x 1 hour ago, lab, ct, ekg, meds

## 2022-09-26 ENCOUNTER — TRANSCRIPTION ENCOUNTER (OUTPATIENT)
Age: 82
End: 2022-09-26

## 2022-09-26 VITALS
OXYGEN SATURATION: 97 % | TEMPERATURE: 98 F | HEART RATE: 75 BPM | SYSTOLIC BLOOD PRESSURE: 132 MMHG | RESPIRATION RATE: 18 BRPM | DIASTOLIC BLOOD PRESSURE: 71 MMHG

## 2022-09-26 LAB
A1C WITH ESTIMATED AVERAGE GLUCOSE RESULT: 7.5 % — HIGH (ref 4–5.6)
ANION GAP SERPL CALC-SCNC: 8 MMOL/L — SIGNIFICANT CHANGE UP (ref 5–17)
BUN SERPL-MCNC: 11 MG/DL — SIGNIFICANT CHANGE UP (ref 7–23)
CALCIUM SERPL-MCNC: 9.1 MG/DL — SIGNIFICANT CHANGE UP (ref 8.5–10.1)
CHLORIDE SERPL-SCNC: 109 MMOL/L — HIGH (ref 96–108)
CHOLEST SERPL-MCNC: 139 MG/DL — SIGNIFICANT CHANGE UP
CO2 SERPL-SCNC: 20 MMOL/L — LOW (ref 22–31)
CREAT SERPL-MCNC: 0.99 MG/DL — SIGNIFICANT CHANGE UP (ref 0.5–1.3)
EGFR: 75 ML/MIN/1.73M2 — SIGNIFICANT CHANGE UP
ESTIMATED AVERAGE GLUCOSE: 169 MG/DL — HIGH (ref 68–114)
GLUCOSE SERPL-MCNC: 132 MG/DL — HIGH (ref 70–99)
HDLC SERPL-MCNC: 42 MG/DL — SIGNIFICANT CHANGE UP
LIPID PNL WITH DIRECT LDL SERPL: 54 MG/DL — SIGNIFICANT CHANGE UP
NON HDL CHOLESTEROL: 97 MG/DL — SIGNIFICANT CHANGE UP
POTASSIUM SERPL-MCNC: 4.2 MMOL/L — SIGNIFICANT CHANGE UP (ref 3.5–5.3)
POTASSIUM SERPL-SCNC: 4.2 MMOL/L — SIGNIFICANT CHANGE UP (ref 3.5–5.3)
SODIUM SERPL-SCNC: 137 MMOL/L — SIGNIFICANT CHANGE UP (ref 135–145)
TRIGL SERPL-MCNC: 215 MG/DL — HIGH

## 2022-09-26 PROCEDURE — 0042T: CPT | Mod: MA

## 2022-09-26 PROCEDURE — 85025 COMPLETE CBC W/AUTO DIFF WBC: CPT

## 2022-09-26 PROCEDURE — 70496 CT ANGIOGRAPHY HEAD: CPT | Mod: MA

## 2022-09-26 PROCEDURE — 70551 MRI BRAIN STEM W/O DYE: CPT | Mod: 26

## 2022-09-26 PROCEDURE — 82962 GLUCOSE BLOOD TEST: CPT

## 2022-09-26 PROCEDURE — U0003: CPT

## 2022-09-26 PROCEDURE — 85610 PROTHROMBIN TIME: CPT

## 2022-09-26 PROCEDURE — 80053 COMPREHEN METABOLIC PANEL: CPT

## 2022-09-26 PROCEDURE — 99291 CRITICAL CARE FIRST HOUR: CPT

## 2022-09-26 PROCEDURE — 93306 TTE W/DOPPLER COMPLETE: CPT | Mod: 26

## 2022-09-26 PROCEDURE — 99239 HOSP IP/OBS DSCHRG MGMT >30: CPT | Mod: GC

## 2022-09-26 PROCEDURE — 70498 CT ANGIOGRAPHY NECK: CPT | Mod: MA

## 2022-09-26 PROCEDURE — 92610 EVALUATE SWALLOWING FUNCTION: CPT

## 2022-09-26 PROCEDURE — 80061 LIPID PANEL: CPT

## 2022-09-26 PROCEDURE — U0005: CPT

## 2022-09-26 PROCEDURE — 85730 THROMBOPLASTIN TIME PARTIAL: CPT

## 2022-09-26 PROCEDURE — 80048 BASIC METABOLIC PNL TOTAL CA: CPT

## 2022-09-26 PROCEDURE — 70450 CT HEAD/BRAIN W/O DYE: CPT | Mod: MA

## 2022-09-26 PROCEDURE — 84484 ASSAY OF TROPONIN QUANT: CPT

## 2022-09-26 PROCEDURE — 83036 HEMOGLOBIN GLYCOSYLATED A1C: CPT

## 2022-09-26 PROCEDURE — 93005 ELECTROCARDIOGRAM TRACING: CPT

## 2022-09-26 PROCEDURE — 97162 PT EVAL MOD COMPLEX 30 MIN: CPT

## 2022-09-26 PROCEDURE — 36415 COLL VENOUS BLD VENIPUNCTURE: CPT

## 2022-09-26 PROCEDURE — 70551 MRI BRAIN STEM W/O DYE: CPT

## 2022-09-26 PROCEDURE — 93306 TTE W/DOPPLER COMPLETE: CPT

## 2022-09-26 RX ORDER — ATORVASTATIN CALCIUM 80 MG/1
1 TABLET, FILM COATED ORAL
Qty: 0 | Refills: 0 | DISCHARGE
Start: 2022-09-26

## 2022-09-26 RX ORDER — ASPIRIN/CALCIUM CARB/MAGNESIUM 324 MG
1 TABLET ORAL
Qty: 0 | Refills: 0 | DISCHARGE
Start: 2022-09-26

## 2022-09-26 RX ORDER — INFLUENZA VIRUS VACCINE 15; 15; 15; 15 UG/.5ML; UG/.5ML; UG/.5ML; UG/.5ML
0.7 SUSPENSION INTRAMUSCULAR ONCE
Refills: 0 | Status: DISCONTINUED | OUTPATIENT
Start: 2022-09-26 | End: 2022-09-26

## 2022-09-26 RX ORDER — SIMVASTATIN 20 MG/1
1 TABLET, FILM COATED ORAL
Qty: 0 | Refills: 0 | DISCHARGE
Start: 2022-09-26

## 2022-09-26 RX ORDER — LOSARTAN POTASSIUM 100 MG/1
100 TABLET, FILM COATED ORAL DAILY
Refills: 0 | Status: DISCONTINUED | OUTPATIENT
Start: 2022-09-26 | End: 2022-09-26

## 2022-09-26 RX ADMIN — MONTELUKAST 10 MILLIGRAM(S): 4 TABLET, CHEWABLE ORAL at 11:24

## 2022-09-26 RX ADMIN — LOSARTAN POTASSIUM 100 MILLIGRAM(S): 100 TABLET, FILM COATED ORAL at 16:50

## 2022-09-26 RX ADMIN — Medication 81 MILLIGRAM(S): at 11:24

## 2022-09-26 NOTE — PHYSICAL THERAPY INITIAL EVALUATION ADULT - SHORT TERM MEMORY, REHAB EVAL
Patient ambulated to ED room 1 A&Ox3, gait steady. Patient c/o left side flank pain along with n/v. That started around 6:45AM    Patient denies chest pain or shortness of breath. forgetful/intact

## 2022-09-26 NOTE — PROGRESS NOTE ADULT - PROBLEM SELECTOR PLAN 5
Diabetes type II (not on home insulin)  Hold oral hypoglycemic meds  Insulin Corrective Scale  Hemoglobin A1c 7.5 on 9/26  Finger sticks per routine  Consistent Carb Diet  Hypoglycemia protocol

## 2022-09-26 NOTE — DISCHARGE NOTE NURSING/CASE MANAGEMENT/SOCIAL WORK - NSDCPEFALRISK_GEN_ALL_CORE
For information on Fall & Injury Prevention, visit: [email or web address] OR  [email or web address] OR  https://www.cdc.gov/steadi/patient.html

## 2022-09-26 NOTE — SWALLOW BEDSIDE ASSESSMENT ADULT - SWALLOW EVAL: DIAGNOSIS
1. Functional oral phase for pureed, regular solids, mildly thick, and thin liquids marked by adequate oral acceptance and timely collection and transport with adequate clearance post swallow observed. 2. Judged functional pharyngeal phase for pureed, regular solids, mildly thick, and thin liquids marked by suspected timely pharyngeal swallow trigger and hyolaryngeal elevation noted by digital palpation without evidence of airway penetration/aspiration. 3. Recommend regular solids and thin liquids with aspiration precautions. Slow pacing, single/small bites/sips, and alternate solids with liquids. Continue to monitor and notify SLP if changes occur.

## 2022-09-26 NOTE — PROGRESS NOTE ADULT - ASSESSMENT
82 y Bel[place]ian speaking male w/ PMHx diabetes type 2, bph, hld, htn, asthma presents with complaint of slurred speech, headache, bl eye pain, dizziness, and difficulty standing. Admit with TIA.

## 2022-09-26 NOTE — DISCHARGE NOTE PROVIDER - NSDCCPCAREPLAN_GEN_ALL_CORE_FT
PRINCIPAL DISCHARGE DIAGNOSIS  Diagnosis: Brain TIA  Assessment and Plan of Treatment: You had a mini-stroke. A small blood clot in your brain briefly caused you to have trouble speaking. It is possible you may have repeat eipsodes of similar mini-strokes. If you have concerning symptoms like these again you should head to closest emergency room or call 911. You should follow stroke prevention lifestyle by excercising, keeping your blood pressure and blood sugar well controled, and taking all medications as prescribed by your primary care doctor. TAKE BABY ASPIRIN EVERYDAY. TAKE YOUR ATORVASTATIN EVERY DAY AS PRESCRIBED.   Follow up with your primary care doctor within 1 week of discharge.       PRINCIPAL DISCHARGE DIAGNOSIS  Diagnosis: Brain TIA  Assessment and Plan of Treatment: You had a mini-stroke. A small blood clot in your brain briefly caused you to have trouble speaking. It is possible you may have repeat eipsodes of similar mini-strokes. If you have concerning symptoms like these again you should head to closest emergency room or call 911. You should follow stroke prevention lifestyle by excercising, keeping your blood pressure and blood sugar well controled, and taking all medications as prescribed by your primary care doctor. TAKE BABY ASPIRIN EVERYDAY.   continue to take your simvastatin   Follow up with your primary care doctor within 1 week of discharge.

## 2022-09-26 NOTE — DISCHARGE NOTE PROVIDER - HOSPITAL COURSE
FROM ADMISSION H+P:   HPI:    82 y m PMHx diabetes type 2, bph, hld, htn asthma presents with complaint of slurred speech, headache, bl eye pain,, dizziness, and difficulty standing. Patient and son state symptoms started 1 hour prior to arrival and lasted approximately 15 minutes.  Per patient he states he had trouble speaking and states it did not last "very long at all". States he is now symptom free. Denies pervious similar episodes.  No sob, cp, n/v, dizziness, weakness or numbness.      In the ed  VS: T 98.5, HR 73, /84, RR 18, SpO2 95 on RA  Labs: Na 134, Glucose 126    CT PERFUSION: No core infarct or penumbra of ischemic tissue is   identified by CT perfusion.    CT ANGIOGRAPHY NECK: Patent cervical vasculature. No hemodynamically   significant carotid stenosis by NASCET criteria or flow-limiting   vertebral artery stenosis.  No evidence of dissection.    CT ANGIOGRAPHY BRAIN: No vessel occlusion, flow-limiting stenosis or   aneurysm.    NASCET CAROTID STENOSIS CRITERIA (distal normal appearing ICA as   denominator for measurement): 0%-none, 1-49%-mild, 50-70%-moderate,   70-89%-severe, 90-99%-critical.  EKG NSR  Given asa 325mg  (25 Sep 2022 22:28)      ---  HOSPITAL COURSE: Patient admitted for TIA. CTA head/neck were negative. MRI showed _______________ . Patient was placed NPO until formal dysphagia screen. Neurology consulted. TTE showed _______________ . PT recommended ____________ .   Patient was medically optimized and improved clinically throughout hospital course. Patient seen and examined on day of discharge. Patient is medically stable for discharge to ______ with outpatient follow up.         ---  CONSULTANTS:   Neuro Dr Novak   Speech and swallow   PT    ---  TIME SPENT:  I, the attending physician, was physically present for the key portions of the evaluation and management (E/M) service provided. The total amount of time spent reviewing the hospital notes, laboratory values, imaging findings, assessing/counseling the patient, discussing with consultant physicians, social work, nursing staff was -- minutes    ---  Primary care provider was made aware of plan for discharge:      [  ] NO     [ x ] YES     FROM ADMISSION H+P:   HPI:    82 y m PMHx diabetes type 2, bph, hld, htn asthma presents with complaint of slurred speech, headache, bl eye pain,, dizziness, and difficulty standing. Patient and son state symptoms started 1 hour prior to arrival and lasted approximately 15 minutes.  Per patient he states he had trouble speaking and states it did not last "very long at all". States he is now symptom free. Denies pervious similar episodes.  No sob, cp, n/v, dizziness, weakness or numbness.      In the ed  VS: T 98.5, HR 73, /84, RR 18, SpO2 95 on RA  Labs: Na 134, Glucose 126    CT PERFUSION: No core infarct or penumbra of ischemic tissue is   identified by CT perfusion.    CT ANGIOGRAPHY NECK: Patent cervical vasculature. No hemodynamically   significant carotid stenosis by NASCET criteria or flow-limiting   vertebral artery stenosis.  No evidence of dissection.    CT ANGIOGRAPHY BRAIN: No vessel occlusion, flow-limiting stenosis or   aneurysm.    NASCET CAROTID STENOSIS CRITERIA (distal normal appearing ICA as   denominator for measurement): 0%-none, 1-49%-mild, 50-70%-moderate,   70-89%-severe, 90-99%-critical.  EKG NSR  Given asa 325mg  (25 Sep 2022 22:28)      ---  HOSPITAL COURSE: Patient admitted for TIA. CTA head/neck were negative. MRI head was negative. Patient was placed NPO until formal dysphagia screen. Neurology consulted. TTE showed _______________ . PT recommended no skilled needs.   Patient was medically optimized and improved clinically throughout hospital course. Patient seen and examined on day of discharge. Patient is medically stable for discharge to home with outpatient follow up.         ---  CONSULTANTS:   Neuro Dr Novak   Speech and swallow   PT    ---  TIME SPENT:  I, the attending physician, was physically present for the key portions of the evaluation and management (E/M) service provided. The total amount of time spent reviewing the hospital notes, laboratory values, imaging findings, assessing/counseling the patient, discussing with consultant physicians, social work, nursing staff was -- minutes    ---  Primary care provider was made aware of plan for discharge:      [  ] NO     [ x ] YES     FROM ADMISSION H+P:   HPI:    82 y m PMHx diabetes type 2, bph, hld, htn asthma presents with complaint of slurred speech, headache, bl eye pain,, dizziness, and difficulty standing. Patient and son state symptoms started 1 hour prior to arrival and lasted approximately 15 minutes.  Per patient he states he had trouble speaking and states it did not last "very long at all". States he is now symptom free. Denies pervious similar episodes.  No sob, cp, n/v, dizziness, weakness or numbness.      In the ed  VS: T 98.5, HR 73, /84, RR 18, SpO2 95 on RA  Labs: Na 134, Glucose 126    CT PERFUSION: No core infarct or penumbra of ischemic tissue is   identified by CT perfusion.    CT ANGIOGRAPHY NECK: Patent cervical vasculature. No hemodynamically   significant carotid stenosis by NASCET criteria or flow-limiting   vertebral artery stenosis.  No evidence of dissection.    CT ANGIOGRAPHY BRAIN: No vessel occlusion, flow-limiting stenosis or   aneurysm.    NASCET CAROTID STENOSIS CRITERIA (distal normal appearing ICA as   denominator for measurement): 0%-none, 1-49%-mild, 50-70%-moderate,   70-89%-severe, 90-99%-critical.  EKG NSR  Given asa 325mg  (25 Sep 2022 22:28)      ---  HOSPITAL COURSE: Patient admitted for TIA. CTA head/neck were negative. MRI head was negative. Patient was placed NPO until formal dysphagia screen. DASH diet with thin liquids restarted after pt evaluated by S&S. Neurology consulted. TTE showed _______________ . PT recommended no skilled needs.   Patient was medically optimized and improved clinically throughout hospital course. Patient seen and examined on day of discharge. Patient is medically stable for discharge to home with outpatient follow up.         ---  CONSULTANTS:   Neuro Dr Novak   Speech and swallow   PT    ---  TIME SPENT:  I, the attending physician, was physically present for the key portions of the evaluation and management (E/M) service provided. The total amount of time spent reviewing the hospital notes, laboratory values, imaging findings, assessing/counseling the patient, discussing with consultant physicians, social work, nursing staff was -- minutes    ---  Primary care provider was made aware of plan for discharge:      [  ] NO     [ x ] YES     FROM ADMISSION H+P:   HPI:    82 y m PMHx diabetes type 2, bph, hld, htn asthma presents with complaint of slurred speech, headache, bl eye pain,, dizziness, and difficulty standing. Patient and son state symptoms started 1 hour prior to arrival and lasted approximately 15 minutes.  Per patient he states he had trouble speaking and states it did not last "very long at all". States he is now symptom free. Denies pervious similar episodes.  No sob, cp, n/v, dizziness, weakness or numbness.      In the ed  VS: T 98.5, HR 73, /84, RR 18, SpO2 95 on RA  Labs: Na 134, Glucose 126    CT PERFUSION: No core infarct or penumbra of ischemic tissue is   identified by CT perfusion.    CT ANGIOGRAPHY NECK: Patent cervical vasculature. No hemodynamically   significant carotid stenosis by NASCET criteria or flow-limiting   vertebral artery stenosis.  No evidence of dissection.    CT ANGIOGRAPHY BRAIN: No vessel occlusion, flow-limiting stenosis or   aneurysm.    NASCET CAROTID STENOSIS CRITERIA (distal normal appearing ICA as   denominator for measurement): 0%-none, 1-49%-mild, 50-70%-moderate,   70-89%-severe, 90-99%-critical.  EKG NSR  Given asa 325mg  (25 Sep 2022 22:28)      ---  HOSPITAL COURSE: Patient admitted for TIA. CTA head/neck were negative. MRI head was negative. Patient was placed NPO until formal dysphagia screen. DASH diet with thin liquids restarted after pt evaluated by S&S. Neurology consulted. PT recommended no skilled needs.   Patient was medically optimized and improved clinically throughout hospital course. Patient seen and examined on day of discharge. Patient is medically stable for discharge to home with outpatient follow up.     ---  CONSULTANTS:   Neuro Dr Novak   Speech and swallow   PT    ---  TIME SPENT:  I, the attending physician, was physically present for the key portions of the evaluation and management (E/M) service provided. The total amount of time spent reviewing the hospital notes, laboratory values, imaging findings, assessing/counseling the patient, discussing with consultant physicians, social work, nursing staff was 50 minutes

## 2022-09-26 NOTE — PATIENT PROFILE ADULT - FALL HARM RISK - HARM RISK INTERVENTIONS

## 2022-09-26 NOTE — PROGRESS NOTE ADULT - ATTENDING COMMENTS
81 y/o M who presented to the ED for evaluation of difficulty with word finding. symptoms started suddenly. He feels better, still with some difficulty with speech but improved from prior.     Denies any headache. Denies any dizziness. Denies any chest pain     A/P:  TIA/asphasia     - MR brain was negative. likely TIA     - will need to continue asa 81mg per discussion with neurology     - neurology following.     - ECHO pending     - cont asa and lipitor    - hgba1c 7.5    DM2    - cont ISS     BPH     - cont flomax     DVT proph: lovenox 40mg daily   updated son at bedside.   possible discharge if ECHO is normal.    PT evaluated

## 2022-09-26 NOTE — DISCHARGE NOTE PROVIDER - CARE PROVIDER_API CALL
[facility]  [address]  [place] [address]  Phone: [phone]  Fax: [phone]  Follow Up Time:    [facility]  [address] [place]  [place] [address]  Phone: [phone]  Fax: [phone]  Follow Up Time:     Robbi Novak  NEUROLOGY  [address] [place] [address]  Phone: [phone]  Fax: [phone]  Follow Up Time: 2 weeks

## 2022-09-26 NOTE — DISCHARGE NOTE PROVIDER - PROVIDER TOKENS
PROVIDER:[TOKEN:[[id number]]] PROVIDER:[TOKEN:[[id number]]],PROVIDER:[TOKEN:[[id number]],FOLLOWUP:[2 weeks]]

## 2022-09-26 NOTE — PROGRESS NOTE ADULT - PROBLEM SELECTOR PLAN 2
-Start high dose atorvastatin 80mg PO qhs  -F/U AM lipid panel -Incr home simvastatin 20mg to high dose atorvastatin 80mg PO qhs  -F/U AM lipid panel

## 2022-09-26 NOTE — PROGRESS NOTE ADULT - PROBLEM SELECTOR PLAN 3
hold home BP medications to allow for permissive HTN   can restart when cleared by neurology Allowed for permissive HTN  Now restart home olmesartan 40mg w/therapeutic interchange of losartan 100mg qd w/hold parameter of SBP <105 mmHg

## 2022-09-26 NOTE — PHYSICAL THERAPY INITIAL EVALUATION ADULT - PERTINENT HX OF CURRENT PROBLEM, REHAB EVAL
82 y m PMHx diabetes type 2, bph, hld, htn asthma presents with complaint of slurred speech, headache, bl eye pain,, dizziness, and difficulty standing. Patient and son state symptoms started 1 hour prior to arrival and lasted approximately 15 minutes.  Per patient he states he had trouble speaking and states it did not last "very long at all". States he is now symptom free. Denies pervious similar episodes.  No sob, cp, n/v, dizziness, weakness or numbness.

## 2022-09-26 NOTE — DISCHARGE NOTE NURSING/CASE MANAGEMENT/SOCIAL WORK - PATIENT PORTAL LINK FT
You can access the FollowMyHealth Patient Portal offered by [facility] by registering at the following website: [email or web address]. By joining [employer]’s FollowMyHealth portal, you will also be able to view your health information using other applications (apps) compatible with our system.

## 2022-09-26 NOTE — DISCHARGE NOTE PROVIDER - NSDCMRMEDTOKEN_GEN_ALL_CORE_FT
aspirin 81 mg oral delayed release tablet: 1 tab(s) orally once a day  esomeprazole 40 mg oral delayed release capsule: 1 cap(s) orally once a day  icosapent 1 g oral capsule: 1 cap(s) orally 2 times a day  Januvia 100 mg oral tablet: 1 tab(s) orally once a day  latanoprost 0.005% ophthalmic solution: 1 drop(s) to each affected eye once a day (in the evening)  metFORMIN 850 mg oral tablet: 1 tab(s) orally 2 times a day  montelukast 10 mg oral tablet: 1 tab(s) orally once a day  olmesartan 40 mg oral tablet: 1 tab(s) orally once a day  tamsulosin 0.4 mg oral capsule: 1 cap(s) orally once a day   aspirin 81 mg oral delayed release tablet: 1 tab(s) orally once a day  esomeprazole 40 mg oral delayed release capsule: 1 cap(s) orally once a day  icosapent 1 g oral capsule: 1 cap(s) orally 2 times a day  Januvia 100 mg oral tablet: 1 tab(s) orally once a day  latanoprost 0.005% ophthalmic solution: 1 drop(s) to each affected eye once a day (in the evening)  metFORMIN 850 mg oral tablet: 1 tab(s) orally 2 times a day  montelukast 10 mg oral tablet: 1 tab(s) orally once a day  olmesartan 40 mg oral tablet: 1 tab(s) orally once a day  tamsulosin 0.4 mg oral capsule: 1 cap(s) orally once a day  Zocor 20 mg oral tablet: 1 milligram(s) orally once a day

## 2022-09-26 NOTE — SWALLOW BEDSIDE ASSESSMENT ADULT - ADDITIONAL RECOMMENDATIONS
Swallow function is grossly WFL and no additional f/u is warranted at this time. Please reconsult this department should there be a change in swallow function noted. Discussed with Dr. Foster's Resident ([id number]) who is in agreement with POC.

## 2022-09-26 NOTE — SWALLOW BEDSIDE ASSESSMENT ADULT - ASR SWALLOW RECOMMEND DIAG
No CXR administered; Discussed with Dr. Foster's Resident ([phone]) who reported no concerns of aspiration/PNA at this time. Patient with no overt signs on baseline diet level and objective testing is not warranted

## 2022-09-26 NOTE — DISCHARGE NOTE PROVIDER - ATTENDING DISCHARGE PHYSICAL EXAMINATION:
GENERAL: NAD  HEENT:  anicteric, moist mucous membranes  CHEST/LUNG:  CTA b/l, no rales, wheezes, or rhonchi  HEART:  RRR, S1, S2  ABDOMEN:  BS+, soft, nontender, nondistended  EXTREMITIES: no edema, cyanosis, or calf tenderness  NERVOUS SYSTEM: answers questions and follows commands appropriately

## 2022-09-26 NOTE — PROGRESS NOTE ADULT - PROBLEM SELECTOR PLAN 1
-Patient presents with hx and examination consistent with TIA  -CT head - CT PERFUSION: No core infarct or penumbra of ischemic tissue is identified by CT perfusion.  CT ANGIOGRAPHY NECK: Patent cervical vasculature. No hemodynamically   significant carotid stenosis by NASCET criteria or flow-limiting vertebral artery stenosis.  No evidence of dissection.  CT ANGIOGRAPHY BRAIN: No vessel occlusion, flow-limiting stenosis or aneurysm.  -Goal -180, keep diastolic <105 to allow for permissive hypertension  - F/U TTE, MRA/MRI head/neck  - neuro checks Q4  -Aspiration, seizure, fall precaution  -Neuro Kishore following  -Bedside speech and swallow recs DASH/TLC  -PT and OT consultation  -F/u lipid profile, TSH for further risk stratification  -Start high dose atorvastatin 80mg PO qhs and ASA 81mg PO qd -Patient presents with hx and examination consistent with TIA  -CT head - CT PERFUSION: No core infarct or penumbra of ischemic tissue is identified by CT perfusion.  CT ANGIOGRAPHY NECK: Patent cervical vasculature. No hemodynamically   significant carotid stenosis by NASCET criteria or flow-limiting vertebral artery stenosis.  No evidence of dissection.  CT ANGIOGRAPHY BRAIN: No vessel occlusion, flow-limiting stenosis or aneurysm.  -Goal -180, keep diastolic <105 to allow for permissive hypertension  - F/U TTE, MRA/MRI head/neck  - neuro checks Q4  -Aspiration, seizure, fall precaution  -Bedside speech and swallow recs DASH/TLC  -F/u lipid profile, TSH for further risk stratification  -Start high dose atorvastatin 80mg PO qhs and ASA 81mg PO qd  -Speech language eval  -PT --> no skilled PT needs  -Neuro Kishore following -Patient presents with hx and examination consistent with TIA  -CT head - CT PERFUSION: No core infarct or penumbra of ischemic tissue is identified by CT perfusion.  CT ANGIOGRAPHY NECK: Patent cervical vasculature. No hemodynamically   significant carotid stenosis by NASCET criteria or flow-limiting vertebral artery stenosis.  No evidence of dissection.  CT ANGIOGRAPHY BRAIN: No vessel occlusion, flow-limiting stenosis or aneurysm.  -Goal -180, keep diastolic <105 to allow for permissive hypertension  - F/U TTE, MRA/MRI head/neck  - neuro checks Q4  -Aspiration, seizure, fall precaution  -Bedside speech and swallow recs DASH/TLC  -F/u lipid profile, TSH for further risk stratification  -Incr home simvastatin 20mg to high dose atorvastatin 80mg PO qhs and start ASA 81mg PO qd  -Speech language eval  -PT --> no skilled PT needs  -Neuro Kishore following

## 2022-09-26 NOTE — PHYSICAL THERAPY INITIAL EVALUATION ADULT - ADDITIONAL COMMENTS
Patient speaks [place].  phone used. Patient states he lives in a 2-story home with steps to 2nd floor bedroom and bath. Patient states he has no problem with steps. Patient ambulates in the community with a cane. Ambulates around the home with no assistive device.

## 2022-09-26 NOTE — PROGRESS NOTE ADULT - SUBJECTIVE AND OBJECTIVE BOX
Patient is a 82y old  Male who presents with a chief complaint of tia (26 Sep 2022 11:04)      TELE: NSR 56. No arrhythmias     INTERVAL HPI/OVERNIGHT EVENTS: No acute overnight events. Pt seen and examined at the bedside this AM. Communicated with pt via "[email or web address]  # [id number]. Pt c/o delayed speech and word finding difficulty. Otherwise, states BL eye pain and  headache has resolved.    MEDICATIONS  (STANDING):  aspirin enteric coated 81 milliGRAM(s) Oral daily  atorvastatin 80 milliGRAM(s) Oral at bedtime  dextrose 5%. 1000 milliLiter(s) (50 mL/Hr) IV Continuous <Continuous>  dextrose 5%. 1000 milliLiter(s) (100 mL/Hr) IV Continuous <Continuous>  dextrose 50% Injectable 25 Gram(s) IV Push once  dextrose 50% Injectable 12.5 Gram(s) IV Push once  dextrose 50% Injectable 25 Gram(s) IV Push once  enoxaparin Injectable 40 milliGRAM(s) SubCutaneous every 24 hours  glucagon  Injectable 1 milliGRAM(s) IntraMuscular once  influenza  Vaccine (HIGH DOSE) 0.7 milliLiter(s) IntraMuscular once  insulin lispro (ADMELOG) corrective regimen sliding scale   SubCutaneous three times a day before meals  insulin lispro (ADMELOG) corrective regimen sliding scale   SubCutaneous at bedtime  latanoprost 0.005% Ophthalmic Solution 1 Drop(s) Both EYES at bedtime  montelukast 10 milliGRAM(s) Oral daily  pantoprazole    Tablet 40 milliGRAM(s) Oral before breakfast  tamsulosin 0.4 milliGRAM(s) Oral at bedtime    MEDICATIONS  (PRN):  dextrose Oral Gel 15 Gram(s) Oral once PRN Blood Glucose LESS THAN 70 milliGRAM(s)/deciliter      Allergies    No Known Allergies    Intolerances        REVIEW OF SYSTEMS:  CONSTITUTIONAL: No fever or chills  HEENT:  No headache, no sore throat  RESPIRATORY: No cough, wheezing, or shortness of breath  CARDIOVASCULAR: No chest pain, palpitations  GASTROINTESTINAL: No abd pain, nausea, vomiting, or diarrhea  GENITOURINARY: No dysuria, frequency, or hematuria  NEUROLOGICAL: no focal weakness or dizziness  MUSCULOSKELETAL: no myalgias     Vital Signs Last 24 Hrs  T(C): 36.7 (26 Sep 2022 12:14), Max: 36.9 (25 Sep 2022 17:26)  T(F): 98 (26 Sep 2022 12:14), Max: 98.5 (25 Sep 2022 17:26)  HR: 60 (26 Sep 2022 12:14) (59 - 81)  BP: 162/74 (26 Sep 2022 12:14) (130/63 - 183/88)  RR: 18 (26 Sep 2022 05:31) (17 - 18)  SpO2: 98% (26 Sep 2022 12:14) (92% - 98%)    Parameters below as of 26 Sep 2022 12:14  Patient On (Oxygen Delivery Method): room air        PHYSICAL EXAM:  GENERAL: NAD  HEENT:  anicteric, moist mucous membranes  CHEST/LUNG:  CTA b/l, no rales, wheezes, or rhonchi  HEART:  RRR, S1, S2  ABDOMEN:  BS+, soft, nontender, nondistended  EXTREMITIES: no edema, cyanosis, or calf tenderness  NERVOUS SYSTEM: answers questions and follows commands appropriately    LABS:                        13.3   8.25  )-----------( 261      ( 25 Sep 2022 18:10 )             39.4     CBC Full  -  ( 25 Sep 2022 18:10 )  WBC Count : 8.25 K/uL  Hemoglobin : 13.3 g/dL  Hematocrit : 39.4 %  Platelet Count - Automated : 261 K/uL  Mean Cell Volume : 96.1 fl  Mean Cell Hemoglobin : 32.4 pg  Mean Cell Hemoglobin Concentration : 33.8 gm/dL  Auto Neutrophil # : 4.56 K/uL  Auto Lymphocyte # : 2.41 K/uL  Auto Monocyte # : 0.96 K/uL  Auto Eosinophil # : 0.12 K/uL  Auto Basophil # : 0.05 K/uL  Auto Neutrophil % : 55.3 %  Auto Lymphocyte % : 29.2 %  Auto Monocyte % : 11.6 %  Auto Eosinophil % : 1.5 %  Auto Basophil % : 0.6 %    26 Sep 2022 08:30    137    |  109    |  11     ----------------------------<  132    4.2     |  20     |  0.99     Ca    9.1        26 Sep 2022 08:30    TPro  7.2    /  Alb  3.7    /  TBili  0.5    /  DBili  x      /  AST  25     /  ALT  50     /  AlkPhos  80     25 Sep 2022 18:10    PT/INR - ( 25 Sep 2022 18:10 )   PT: 11.2 sec;   INR: 0.96 ratio         PTT - ( 25 Sep 2022 18:10 )  PTT:32.0 sec    CAPILLARY BLOOD GLUCOSE      POCT Blood Glucose.: 128 mg/dL (26 Sep 2022 12:10)  POCT Blood Glucose.: 138 mg/dL (26 Sep 2022 08:10)  POCT Blood Glucose.: 122 mg/dL (25 Sep 2022 23:42)  POCT Blood Glucose.: 135 mg/dL (25 Sep 2022 18:07)          RADIOLOGY & ADDITIONAL TESTS:  Personally reviewed.     Consultant(s) Notes Reviewed:  [x] YES  [ ] NO

## 2022-09-26 NOTE — SWALLOW BEDSIDE ASSESSMENT ADULT - COMMENTS
Consult received and chart reviewed. Patient seen at bedside this AM for initial assessment of swallow function, at which time he was alert, oriented x2, cooperative, and denied pain. Scytl (ID# [id number]) provided Lithuanian translation throughout assessment. Patient demonstrates ability to follow low level commands. Vocal quality and speech production WFL.    Per charting, the patient is a "82 y m PMHx diabetes type 2, bph, hld, htn asthma presents with complaint of slurred speech, headache, bl eye pain,, dizziness, and difficulty standing. Patient and son state symptoms started 1 hour prior to arrival and lasted approximately 15 minutes.  Per patient he states he had trouble speaking and states it did not last "very long at all". States he is now symptom free. Denies pervious similar episodes.  No sob, cp, n/v, dizziness, weakness or numbness.  "    WBC WFL.  No CXR administered.  MR Head 9/26/22 revealed "No hydrocephalus, mass effect, acute intracranial hemorrhage, vasogenic edema, or acute territorial infarct. Mild white matter microvascular ischemic disease."    Discussed results and recommendations with the patient, RN, and Dr. Foster's Resident ([phone]).

## 2022-09-27 RX ORDER — ASPIRIN/CALCIUM CARB/MAGNESIUM 324 MG
1 TABLET ORAL
Qty: 0 | Refills: 0 | DISCHARGE
Start: 2022-09-27

## 2022-09-27 RX ORDER — OLMESARTAN MEDOXOMIL 5 MG/1
1 TABLET, FILM COATED ORAL
Qty: 0 | Refills: 0 | DISCHARGE
Start: 2022-09-27

## 2022-09-27 RX ORDER — SIMVASTATIN 20 MG/1
1 TABLET, FILM COATED ORAL
Qty: 0 | Refills: 0 | DISCHARGE
Start: 2022-09-27

## 2023-01-12 ENCOUNTER — RX RENEWAL (OUTPATIENT)
Age: 83
End: 2023-01-12

## 2023-01-16 ENCOUNTER — INPATIENT (INPATIENT)
Facility: HOSPITAL | Age: 83
LOS: 1 days | Discharge: HOME | DRG: 69 | End: 2023-01-18
Attending: FAMILY MEDICINE | Admitting: INTERNAL MEDICINE
Payer: MEDICARE

## 2023-01-16 VITALS
DIASTOLIC BLOOD PRESSURE: 73 MMHG | HEIGHT: 66 IN | TEMPERATURE: 98 F | OXYGEN SATURATION: 95 % | SYSTOLIC BLOOD PRESSURE: 138 MMHG | HEART RATE: 121 BPM | RESPIRATION RATE: 16 BRPM

## 2023-01-16 DIAGNOSIS — Z29.9 ENCOUNTER FOR PROPHYLACTIC MEASURES, UNSPECIFIED: ICD-10-CM

## 2023-01-16 DIAGNOSIS — R65.10 SYSTEMIC INFLAMMATORY RESPONSE SYNDROME (SIRS) OF NON-INFECTIOUS ORIGIN WITHOUT ACUTE ORGAN DYSFUNCTION: ICD-10-CM

## 2023-01-16 DIAGNOSIS — E11.9 TYPE 2 DIABETES MELLITUS WITHOUT COMPLICATIONS: ICD-10-CM

## 2023-01-16 DIAGNOSIS — I10 ESSENTIAL (PRIMARY) HYPERTENSION: ICD-10-CM

## 2023-01-16 DIAGNOSIS — G45.9 TRANSIENT CEREBRAL ISCHEMIC ATTACK, UNSPECIFIED: ICD-10-CM

## 2023-01-16 DIAGNOSIS — A41.9 SEPSIS, UNSPECIFIED ORGANISM: ICD-10-CM

## 2023-01-16 DIAGNOSIS — E78.00 PURE HYPERCHOLESTEROLEMIA, UNSPECIFIED: ICD-10-CM

## 2023-01-16 DIAGNOSIS — N40.0 BENIGN PROSTATIC HYPERPLASIA WITHOUT LOWER URINARY TRACT SYMPTOMS: ICD-10-CM

## 2023-01-16 DIAGNOSIS — G91.9 HYDROCEPHALUS, UNSPECIFIED: ICD-10-CM

## 2023-01-16 DIAGNOSIS — J45.909 UNSPECIFIED ASTHMA, UNCOMPLICATED: ICD-10-CM

## 2023-01-16 LAB
ALBUMIN SERPL ELPH-MCNC: 3.8 G/DL — SIGNIFICANT CHANGE UP (ref 3.3–5)
ALP SERPL-CCNC: 82 U/L — SIGNIFICANT CHANGE UP (ref 40–120)
ALT FLD-CCNC: 50 U/L — SIGNIFICANT CHANGE UP (ref 12–78)
ANION GAP SERPL CALC-SCNC: 9 MMOL/L — SIGNIFICANT CHANGE UP (ref 5–17)
APPEARANCE UR: CLEAR — SIGNIFICANT CHANGE UP
APTT BLD: 28.1 SEC — SIGNIFICANT CHANGE UP (ref 27.5–35.5)
AST SERPL-CCNC: 29 U/L — SIGNIFICANT CHANGE UP (ref 15–37)
BASE EXCESS BLDV CALC-SCNC: -7.4 MMOL/L — LOW (ref -2–3)
BASOPHILS # BLD AUTO: 0.04 K/UL — SIGNIFICANT CHANGE UP (ref 0–0.2)
BASOPHILS NFR BLD AUTO: 0.2 % — SIGNIFICANT CHANGE UP (ref 0–2)
BILIRUB SERPL-MCNC: 0.6 MG/DL — SIGNIFICANT CHANGE UP (ref 0.2–1.2)
BILIRUB UR-MCNC: NEGATIVE — SIGNIFICANT CHANGE UP
BUN SERPL-MCNC: 16 MG/DL — SIGNIFICANT CHANGE UP (ref 7–23)
CALCIUM SERPL-MCNC: 8.8 MG/DL — SIGNIFICANT CHANGE UP (ref 8.5–10.1)
CHLORIDE SERPL-SCNC: 105 MMOL/L — SIGNIFICANT CHANGE UP (ref 96–108)
CHOLEST SERPL-MCNC: 134 MG/DL — SIGNIFICANT CHANGE UP
CO2 SERPL-SCNC: 21 MMOL/L — LOW (ref 22–31)
COLOR SPEC: SIGNIFICANT CHANGE UP
CREAT SERPL-MCNC: 1.1 MG/DL — SIGNIFICANT CHANGE UP (ref 0.5–1.3)
DIFF PNL FLD: NEGATIVE — SIGNIFICANT CHANGE UP
EGFR: 66 ML/MIN/1.73M2 — SIGNIFICANT CHANGE UP
EOSINOPHIL # BLD AUTO: 0.06 K/UL — SIGNIFICANT CHANGE UP (ref 0–0.5)
EOSINOPHIL NFR BLD AUTO: 0.3 % — SIGNIFICANT CHANGE UP (ref 0–6)
FLUAV AG NPH QL: SIGNIFICANT CHANGE UP
FLUBV AG NPH QL: SIGNIFICANT CHANGE UP
GAS PNL BLDV: SIGNIFICANT CHANGE UP
GLUCOSE SERPL-MCNC: 225 MG/DL — HIGH (ref 70–99)
GLUCOSE UR QL: 250
HCO3 BLDV-SCNC: 18 MMOL/L — LOW (ref 22–29)
HCT VFR BLD CALC: 43 % — SIGNIFICANT CHANGE UP (ref 39–50)
HDLC SERPL-MCNC: 51 MG/DL — SIGNIFICANT CHANGE UP
HGB BLD-MCNC: 14.4 G/DL — SIGNIFICANT CHANGE UP (ref 13–17)
IMM GRANULOCYTES NFR BLD AUTO: 1.2 % — HIGH (ref 0–0.9)
INR BLD: 0.95 RATIO — SIGNIFICANT CHANGE UP (ref 0.88–1.16)
KETONES UR-MCNC: ABNORMAL
LACTATE SERPL-SCNC: 2.8 MMOL/L — HIGH (ref 0.7–2)
LACTATE SERPL-SCNC: 3.4 MMOL/L — HIGH (ref 0.7–2)
LACTATE SERPL-SCNC: 3.5 MMOL/L — HIGH (ref 0.7–2)
LEUKOCYTE ESTERASE UR-ACNC: NEGATIVE — SIGNIFICANT CHANGE UP
LIPID PNL WITH DIRECT LDL SERPL: 61 MG/DL — SIGNIFICANT CHANGE UP
LYMPHOCYTES # BLD AUTO: 1.01 K/UL — SIGNIFICANT CHANGE UP (ref 1–3.3)
LYMPHOCYTES # BLD AUTO: 5.6 % — LOW (ref 13–44)
MCHC RBC-ENTMCNC: 32.2 PG — SIGNIFICANT CHANGE UP (ref 27–34)
MCHC RBC-ENTMCNC: 33.5 GM/DL — SIGNIFICANT CHANGE UP (ref 32–36)
MCV RBC AUTO: 96.2 FL — SIGNIFICANT CHANGE UP (ref 80–100)
MONOCYTES # BLD AUTO: 0.97 K/UL — HIGH (ref 0–0.9)
MONOCYTES NFR BLD AUTO: 5.4 % — SIGNIFICANT CHANGE UP (ref 2–14)
NEUTROPHILS # BLD AUTO: 15.76 K/UL — HIGH (ref 1.8–7.4)
NEUTROPHILS NFR BLD AUTO: 87.3 % — HIGH (ref 43–77)
NITRITE UR-MCNC: NEGATIVE — SIGNIFICANT CHANGE UP
NON HDL CHOLESTEROL: 83 MG/DL — SIGNIFICANT CHANGE UP
NRBC # BLD: 0 /100 WBCS — SIGNIFICANT CHANGE UP (ref 0–0)
PCO2 BLDV: 30 MMHG — LOW (ref 42–55)
PH BLDV: 7.38 — SIGNIFICANT CHANGE UP (ref 7.32–7.43)
PH UR: 7 — SIGNIFICANT CHANGE UP (ref 5–8)
PLATELET # BLD AUTO: 268 K/UL — SIGNIFICANT CHANGE UP (ref 150–400)
PO2 BLDV: 98 MMHG — HIGH (ref 25–45)
POTASSIUM SERPL-MCNC: 3.8 MMOL/L — SIGNIFICANT CHANGE UP (ref 3.5–5.3)
POTASSIUM SERPL-SCNC: 3.8 MMOL/L — SIGNIFICANT CHANGE UP (ref 3.5–5.3)
PROT SERPL-MCNC: 7.3 G/DL — SIGNIFICANT CHANGE UP (ref 6–8.3)
PROT UR-MCNC: NEGATIVE — SIGNIFICANT CHANGE UP
PROTHROM AB SERPL-ACNC: 11.1 SEC — SIGNIFICANT CHANGE UP (ref 10.5–13.4)
RBC # BLD: 4.47 M/UL — SIGNIFICANT CHANGE UP (ref 4.2–5.8)
RBC # FLD: 12.6 % — SIGNIFICANT CHANGE UP (ref 10.3–14.5)
RSV RNA NPH QL NAA+NON-PROBE: SIGNIFICANT CHANGE UP
SAO2 % BLDV: 97.7 % — HIGH (ref 67–88)
SARS-COV-2 RNA SPEC QL NAA+PROBE: SIGNIFICANT CHANGE UP
SODIUM SERPL-SCNC: 135 MMOL/L — SIGNIFICANT CHANGE UP (ref 135–145)
SP GR SPEC: 1 — LOW (ref 1.01–1.02)
TRIGL SERPL-MCNC: 112 MG/DL — SIGNIFICANT CHANGE UP
TROPONIN I, HIGH SENSITIVITY RESULT: 6 NG/L — SIGNIFICANT CHANGE UP
TSH SERPL-MCNC: 0.99 UIU/ML — SIGNIFICANT CHANGE UP (ref 0.36–3.74)
UROBILINOGEN FLD QL: NEGATIVE — SIGNIFICANT CHANGE UP
WBC # BLD: 18.05 K/UL — HIGH (ref 3.8–10.5)
WBC # FLD AUTO: 18.05 K/UL — HIGH (ref 3.8–10.5)

## 2023-01-16 PROCEDURE — 93010 ELECTROCARDIOGRAM REPORT: CPT

## 2023-01-16 PROCEDURE — 99291 CRITICAL CARE FIRST HOUR: CPT

## 2023-01-16 PROCEDURE — 0042T: CPT | Mod: MA

## 2023-01-16 PROCEDURE — 74177 CT ABD & PELVIS W/CONTRAST: CPT | Mod: 26

## 2023-01-16 PROCEDURE — 71275 CT ANGIOGRAPHY CHEST: CPT | Mod: 26

## 2023-01-16 PROCEDURE — 70498 CT ANGIOGRAPHY NECK: CPT | Mod: 26,MA

## 2023-01-16 PROCEDURE — 71045 X-RAY EXAM CHEST 1 VIEW: CPT | Mod: 26

## 2023-01-16 PROCEDURE — 99223 1ST HOSP IP/OBS HIGH 75: CPT | Mod: GC

## 2023-01-16 PROCEDURE — 70496 CT ANGIOGRAPHY HEAD: CPT | Mod: 26,MA

## 2023-01-16 RX ORDER — DULOXETINE HYDROCHLORIDE 30 MG/1
30 CAPSULE, DELAYED RELEASE ORAL DAILY
Refills: 0 | Status: DISCONTINUED | OUTPATIENT
Start: 2023-01-16 | End: 2023-01-18

## 2023-01-16 RX ORDER — ACETAZOLAMIDE 250 MG/1
125 TABLET ORAL DAILY
Refills: 0 | Status: DISCONTINUED | OUTPATIENT
Start: 2023-01-16 | End: 2023-01-18

## 2023-01-16 RX ORDER — SIMVASTATIN 20 MG/1
20 TABLET, FILM COATED ORAL AT BEDTIME
Refills: 0 | Status: DISCONTINUED | OUTPATIENT
Start: 2023-01-16 | End: 2023-01-18

## 2023-01-16 RX ORDER — PIPERACILLIN AND TAZOBACTAM 4; .5 G/20ML; G/20ML
3.38 INJECTION, POWDER, LYOPHILIZED, FOR SOLUTION INTRAVENOUS ONCE
Refills: 0 | Status: COMPLETED | OUTPATIENT
Start: 2023-01-16 | End: 2023-01-16

## 2023-01-16 RX ORDER — IPRATROPIUM/ALBUTEROL SULFATE 18-103MCG
3 AEROSOL WITH ADAPTER (GRAM) INHALATION EVERY 6 HOURS
Refills: 0 | Status: DISCONTINUED | OUTPATIENT
Start: 2023-01-16 | End: 2023-01-16

## 2023-01-16 RX ORDER — CELECOXIB 200 MG/1
200 CAPSULE ORAL DAILY
Refills: 0 | Status: DISCONTINUED | OUTPATIENT
Start: 2023-01-16 | End: 2023-01-16

## 2023-01-16 RX ORDER — DEXTROSE 50 % IN WATER 50 %
15 SYRINGE (ML) INTRAVENOUS ONCE
Refills: 0 | Status: DISCONTINUED | OUTPATIENT
Start: 2023-01-16 | End: 2023-01-18

## 2023-01-16 RX ORDER — LATANOPROST 0.05 MG/ML
1 SOLUTION/ DROPS OPHTHALMIC; TOPICAL AT BEDTIME
Refills: 0 | Status: DISCONTINUED | OUTPATIENT
Start: 2023-01-16 | End: 2023-01-18

## 2023-01-16 RX ORDER — DEXTROSE 50 % IN WATER 50 %
12.5 SYRINGE (ML) INTRAVENOUS ONCE
Refills: 0 | Status: DISCONTINUED | OUTPATIENT
Start: 2023-01-16 | End: 2023-01-18

## 2023-01-16 RX ORDER — DEXTROSE 50 % IN WATER 50 %
25 SYRINGE (ML) INTRAVENOUS ONCE
Refills: 0 | Status: DISCONTINUED | OUTPATIENT
Start: 2023-01-16 | End: 2023-01-18

## 2023-01-16 RX ORDER — INSULIN LISPRO 100/ML
VIAL (ML) SUBCUTANEOUS
Refills: 0 | Status: DISCONTINUED | OUTPATIENT
Start: 2023-01-16 | End: 2023-01-18

## 2023-01-16 RX ORDER — SODIUM CHLORIDE 9 MG/ML
1000 INJECTION, SOLUTION INTRAVENOUS
Refills: 0 | Status: DISCONTINUED | OUTPATIENT
Start: 2023-01-16 | End: 2023-01-18

## 2023-01-16 RX ORDER — SODIUM CHLORIDE 9 MG/ML
1000 INJECTION INTRAMUSCULAR; INTRAVENOUS; SUBCUTANEOUS ONCE
Refills: 0 | Status: COMPLETED | OUTPATIENT
Start: 2023-01-16 | End: 2023-01-16

## 2023-01-16 RX ORDER — LOSARTAN POTASSIUM 100 MG/1
100 TABLET, FILM COATED ORAL DAILY
Refills: 0 | Status: DISCONTINUED | OUTPATIENT
Start: 2023-01-16 | End: 2023-01-18

## 2023-01-16 RX ORDER — VANCOMYCIN HCL 1 G
1000 VIAL (EA) INTRAVENOUS EVERY 12 HOURS
Refills: 0 | Status: DISCONTINUED | OUTPATIENT
Start: 2023-01-16 | End: 2023-01-16

## 2023-01-16 RX ORDER — ALBUTEROL 90 UG/1
2.5 AEROSOL, METERED ORAL EVERY 8 HOURS
Refills: 0 | Status: DISCONTINUED | OUTPATIENT
Start: 2023-01-16 | End: 2023-01-18

## 2023-01-16 RX ORDER — ASPIRIN/CALCIUM CARB/MAGNESIUM 324 MG
81 TABLET ORAL DAILY
Refills: 0 | Status: DISCONTINUED | OUTPATIENT
Start: 2023-01-16 | End: 2023-01-18

## 2023-01-16 RX ORDER — MONTELUKAST 4 MG/1
10 TABLET, CHEWABLE ORAL DAILY
Refills: 0 | Status: DISCONTINUED | OUTPATIENT
Start: 2023-01-16 | End: 2023-01-18

## 2023-01-16 RX ORDER — ACETAMINOPHEN 500 MG
650 TABLET ORAL ONCE
Refills: 0 | Status: COMPLETED | OUTPATIENT
Start: 2023-01-16 | End: 2023-01-16

## 2023-01-16 RX ORDER — ENOXAPARIN SODIUM 100 MG/ML
40 INJECTION SUBCUTANEOUS EVERY 24 HOURS
Refills: 0 | Status: DISCONTINUED | OUTPATIENT
Start: 2023-01-16 | End: 2023-01-18

## 2023-01-16 RX ORDER — LANOLIN ALCOHOL/MO/W.PET/CERES
3 CREAM (GRAM) TOPICAL AT BEDTIME
Refills: 0 | Status: DISCONTINUED | OUTPATIENT
Start: 2023-01-16 | End: 2023-01-18

## 2023-01-16 RX ORDER — INSULIN LISPRO 100/ML
VIAL (ML) SUBCUTANEOUS AT BEDTIME
Refills: 0 | Status: DISCONTINUED | OUTPATIENT
Start: 2023-01-16 | End: 2023-01-18

## 2023-01-16 RX ORDER — FUROSEMIDE 40 MG
40 TABLET ORAL ONCE
Refills: 0 | Status: COMPLETED | OUTPATIENT
Start: 2023-01-16 | End: 2023-01-16

## 2023-01-16 RX ORDER — GLUCAGON INJECTION, SOLUTION 0.5 MG/.1ML
1 INJECTION, SOLUTION SUBCUTANEOUS ONCE
Refills: 0 | Status: DISCONTINUED | OUTPATIENT
Start: 2023-01-16 | End: 2023-01-18

## 2023-01-16 RX ORDER — VANCOMYCIN HCL 1 G
1000 VIAL (EA) INTRAVENOUS ONCE
Refills: 0 | Status: COMPLETED | OUTPATIENT
Start: 2023-01-16 | End: 2023-01-16

## 2023-01-16 RX ORDER — ACETAMINOPHEN 500 MG
650 TABLET ORAL EVERY 6 HOURS
Refills: 0 | Status: DISCONTINUED | OUTPATIENT
Start: 2023-01-16 | End: 2023-01-18

## 2023-01-16 RX ORDER — PIPERACILLIN AND TAZOBACTAM 4; .5 G/20ML; G/20ML
3.38 INJECTION, POWDER, LYOPHILIZED, FOR SOLUTION INTRAVENOUS EVERY 8 HOURS
Refills: 0 | Status: DISCONTINUED | OUTPATIENT
Start: 2023-01-16 | End: 2023-01-18

## 2023-01-16 RX ORDER — TAMSULOSIN HYDROCHLORIDE 0.4 MG/1
0.4 CAPSULE ORAL AT BEDTIME
Refills: 0 | Status: DISCONTINUED | OUTPATIENT
Start: 2023-01-16 | End: 2023-01-18

## 2023-01-16 RX ORDER — ONDANSETRON 8 MG/1
4 TABLET, FILM COATED ORAL EVERY 8 HOURS
Refills: 0 | Status: DISCONTINUED | OUTPATIENT
Start: 2023-01-16 | End: 2023-01-18

## 2023-01-16 RX ORDER — FINASTERIDE 5 MG/1
5 TABLET, FILM COATED ORAL DAILY
Refills: 0 | Status: DISCONTINUED | OUTPATIENT
Start: 2023-01-16 | End: 2023-01-18

## 2023-01-16 RX ORDER — PANTOPRAZOLE SODIUM 20 MG/1
40 TABLET, DELAYED RELEASE ORAL
Refills: 0 | Status: DISCONTINUED | OUTPATIENT
Start: 2023-01-16 | End: 2023-01-18

## 2023-01-16 RX ADMIN — SODIUM CHLORIDE 1000 MILLILITER(S): 9 INJECTION INTRAMUSCULAR; INTRAVENOUS; SUBCUTANEOUS at 08:32

## 2023-01-16 RX ADMIN — SODIUM CHLORIDE 1000 MILLILITER(S): 9 INJECTION INTRAMUSCULAR; INTRAVENOUS; SUBCUTANEOUS at 10:17

## 2023-01-16 RX ADMIN — MONTELUKAST 10 MILLIGRAM(S): 4 TABLET, CHEWABLE ORAL at 13:37

## 2023-01-16 RX ADMIN — Medication 650 MILLIGRAM(S): at 08:17

## 2023-01-16 RX ADMIN — Medication 3 MILLILITER(S): at 14:08

## 2023-01-16 RX ADMIN — Medication 650 MILLIGRAM(S): at 09:24

## 2023-01-16 RX ADMIN — ENOXAPARIN SODIUM 40 MILLIGRAM(S): 100 INJECTION SUBCUTANEOUS at 13:37

## 2023-01-16 RX ADMIN — Medication 650 MILLIGRAM(S): at 14:40

## 2023-01-16 RX ADMIN — Medication 250 MILLIGRAM(S): at 08:20

## 2023-01-16 RX ADMIN — Medication 81 MILLIGRAM(S): at 13:37

## 2023-01-16 RX ADMIN — ALBUTEROL 2.5 MILLIGRAM(S): 90 AEROSOL, METERED ORAL at 22:14

## 2023-01-16 RX ADMIN — Medication 40 MILLIGRAM(S): at 16:05

## 2023-01-16 RX ADMIN — PIPERACILLIN AND TAZOBACTAM 25 GRAM(S): 4; .5 INJECTION, POWDER, LYOPHILIZED, FOR SOLUTION INTRAVENOUS at 16:05

## 2023-01-16 RX ADMIN — PIPERACILLIN AND TAZOBACTAM 3.38 GRAM(S): 4; .5 INJECTION, POWDER, LYOPHILIZED, FOR SOLUTION INTRAVENOUS at 08:37

## 2023-01-16 RX ADMIN — LATANOPROST 1 DROP(S): 0.05 SOLUTION/ DROPS OPHTHALMIC; TOPICAL at 21:59

## 2023-01-16 RX ADMIN — SODIUM CHLORIDE 1000 MILLILITER(S): 9 INJECTION INTRAMUSCULAR; INTRAVENOUS; SUBCUTANEOUS at 09:17

## 2023-01-16 RX ADMIN — DULOXETINE HYDROCHLORIDE 30 MILLIGRAM(S): 30 CAPSULE, DELAYED RELEASE ORAL at 13:37

## 2023-01-16 RX ADMIN — Medication 1000 MILLIGRAM(S): at 09:24

## 2023-01-16 RX ADMIN — PIPERACILLIN AND TAZOBACTAM 25 GRAM(S): 4; .5 INJECTION, POWDER, LYOPHILIZED, FOR SOLUTION INTRAVENOUS at 22:23

## 2023-01-16 RX ADMIN — SIMVASTATIN 20 MILLIGRAM(S): 20 TABLET, FILM COATED ORAL at 21:59

## 2023-01-16 RX ADMIN — FINASTERIDE 5 MILLIGRAM(S): 5 TABLET, FILM COATED ORAL at 13:37

## 2023-01-16 RX ADMIN — PIPERACILLIN AND TAZOBACTAM 200 GRAM(S): 4; .5 INJECTION, POWDER, LYOPHILIZED, FOR SOLUTION INTRAVENOUS at 08:17

## 2023-01-16 RX ADMIN — TAMSULOSIN HYDROCHLORIDE 0.4 MILLIGRAM(S): 0.4 CAPSULE ORAL at 21:59

## 2023-01-16 NOTE — CONSULT NOTE ADULT - ASSESSMENT
Full note to follow pending evaluation    Covering Dr. Waggoner  Infectious Diseases will continue to follow. Please call with any questions.   Noemi Mi M.D.  [facility] Division of Infectious Diseases [phone]   83 y/o M w/ PMH of HTN, HLD, history of prior TIA (09/2022 adm to [facility]), type 2 diabetes mellitus, hydrocephalus, BPH, and asthma BIBA for R sided weakness and cough meets sepsis criteria, admitted for TIA and sepsis workup.    Sepsis 2/2 possible aspiration PNA  - CXR appears clear  - CTA w/o PE, no obvious consolidation  - UA negative, UCx pending collection  - BCx pending  - RVP/COVID negative  - uPNA Ag ordered  Plan:   C/w zosyn for now  F/u pending infectious w/u  Trend temps/WBC  Supportive care/O2 as needed    Additional management per primary / neurology teams    Covering Dr. Waggoner  Infectious Diseases will continue to follow. Please call with any questions.   Noemi Mi M.D.  \[facility]\"" Division of Infectious Diseases [phone]

## 2023-01-16 NOTE — H&P ADULT - ASSESSMENT
81 y/o M w/ PMH of HTN, HLD, history of prior TIA (09/2022 adm to [facility]), TII DM, hydrocephalus, BPH, and asthma BIBA for R sided weakness and cough meets sepsis criteria, admitted for TIA and sepsis workup.   83 y/o M w/ PMH of HTN, HLD, history of prior TIA (09/2022 adm to [facility]), type 2 diabetes mellitus, hydrocephalus, BPH, and asthma BIBA for R sided weakness and cough meets sepsis criteria, admitted for TIA and sepsis workup.

## 2023-01-16 NOTE — ED ADULT NURSE REASSESSMENT NOTE - COMFORT CARE
meal provided/po fluids offered/repositioned/side rails up/wait time explained/warm blanket provided
repositioned/side rails up/wait time explained/warm blanket provided
repositioned/side rails up/wait time explained/warm blanket provided

## 2023-01-16 NOTE — ED PROVIDER NOTE - CLINICAL SUMMARY MEDICAL DECISION MAKING FREE TEXT BOX
82-year-old gentleman with history of hypertension, diabetes, hydrocephalus, hyperlipidemia, prior history of CVA, TIA brought in as a stroke notification by EMS.  NIH stroke scale on arrival was 2 stroke code was activated.  CT head is negative.  Initial complaint by family were patient appeared confused and had difficulty getting out of bed and walking and they thought he had right-sided weakness.  On my evaluation patient had equal strength bilaterally without limb ataxia.  As per family his speech appears normal and is not dysarthric or is aphasic.  Case was discussed with Dr. Becker, stroke neurologist via the transfer center who recommended patient is not a thrombolytic candidate to continue with conservative management and stroke work-up.  Patient subsequently spiked a temperature of 101.  Sepsis work-up was also initiated.  His WBC count is also elevated to 18,000.  Lactic acid is also elevated to 2.8.  Will administer IV antibiotics, IV fluids obtain cultures and admit patient for further stroke work-up and sepsis treatment.

## 2023-01-16 NOTE — ED ADULT TRIAGE NOTE - CHIEF COMPLAINT QUOTE
Patient brought in by ambulance from home as reported had right sided weakness last known well 9 pm when he went to bed

## 2023-01-16 NOTE — H&P ADULT - NSHPPHYSICALEXAM_GEN_ALL_CORE
T(C): 38.6 (01-16-23 @ 07:40), Max: 38.6 (01-16-23 @ 07:40)  HR: 108 (01-16-23 @ 07:40) (108 - 121)  BP: 114/63 (01-16-23 @ 07:40) (114/63 - 138/73)  RR: 16 (01-16-23 @ 07:40) (16 - 16)  SpO2: 100% (01-16-23 @ 07:40) (95% - 100%)    GENERAL: patient appears well, no acute distress, appropriate, pleasant  EYES: sclera clear, no exudates  ENMT: oropharynx clear without erythema, no exudates, moist mucous membranes  NECK: supple, soft, no thyromegaly noted  LUNGS: good air entry bilaterally, clear to auscultation, symmetric breath sounds, no wheezing or rhonchi appreciated  HEART: soft S1/S2, regular rate and rhythm, no murmurs noted, no lower extremity edema  GASTROINTESTINAL: abdomen is soft, nontender, nondistended, normoactive bowel sounds, no palpable masses  INTEGUMENT: good skin turgor, warm skin, appears well perfused  MUSCULOSKELETAL: no clubbing or cyanosis, no obvious deformity  NEUROLOGIC: awake, alert, oriented x3, good muscle tone in 4 extremities, no obvious sensory deficits  PSYCHIATRIC: mood is good, affect is congruent, linear and logical thought process  HEME/LYMPH: no palpable supraclavicular nodules, no obvious ecchymosis or petechiae T(C): 38.6 (01-16-23 @ 07:40), Max: 38.6 (01-16-23 @ 07:40)  HR: 108 (01-16-23 @ 07:40) (108 - 121)  BP: 114/63 (01-16-23 @ 07:40) (114/63 - 138/73)  RR: 16 (01-16-23 @ 07:40) (16 - 16)  SpO2: 100% (01-16-23 @ 07:40) (95% - 100%)    GENERAL: patient appears well, no acute distress, appropriate, pleasant  EYES: EOMI, PERRLA, sclera clear, no exudates  ENMT: oropharynx clear without erythema, no exudates, moist mucous membranes  LUNGS: + Faint expiratory wheezing and crackles.  Symmetric breath sounds, no rhonchi appreciated  HEART: soft S1/S2, tachycardic w/ regular rhythm. No murmurs noted, No lower extremity edema  GASTROINTESTINAL: NTND, normoactive bowel sounds, no palpable masses  INTEGUMENT: good skin turgor, warm skin, appears well perfused  MUSCULOSKELETAL: no clubbing or cyanosis, no obvious deformity  NEUROLOGIC: CNII-XII grossly intact. Good muscle tone in 4 extremities, No obvious sensory deficits  PSYCHIATRIC: mood is good, affect is congruent, linear and logical thought process

## 2023-01-16 NOTE — CONSULT NOTE ADULT - SUBJECTIVE AND OBJECTIVE BOX
Date/Time Patient Seen:  		  Referring MD:   Data Reviewed	       Patient is a 82y old  Male who presents with a chief complaint of TIA, sepsis (16 Jan 2023 14:07)      Subjective/HPI   History of Present Illness:   Patient is an 81 y/o M w/ PMH of HTN, HLD, history of prior TIA (09/2022 adm to [facility]), Type 2 Diabetes Mellitus, hydrocephalus, BPH, and asthma BIBA for R sided weakness and cough. Patient Malay speaking, son translated. Per the son patient with non-productive cough x 3 days. Went to sleep at 9PM on 1/15, last known well. At 3 AM the patient was awake and coughing. The son checked on the patient who was noted to have difficulty arising from bed with some R sided weakness, experiencing a coughing fit with SOB, rigors and appeared to have had urinated and defecated on himself. Son endorsed patient had an inability to ambulate at that point. Of note this was the patient's first episode of incontinence. Patient w/ history of spinal injection for back pain ~ 1 week ago. Upon arrival to the ED the patient had an episode of emesis, O2 sats about 91-92%; improvement noted on NC. Concern for possible aspiration though per charting patient was sitting upright when he vomited, NBNB emesis. No sick contacts per son though patient does go to  M,W,F.  PAST MEDICAL & SURGICAL HISTORY:  Diabetes    Hypertension    Hypercholesteremia    Asthma    Benign prostatic hyperplasia    No significant past surgical history          Medication list         MEDICATIONS  (STANDING):  acetaZOLAMIDE    Tablet 125 milliGRAM(s) Oral daily  aspirin  chewable 81 milliGRAM(s) Oral daily  dextrose 5%. 1000 milliLiter(s) (50 mL/Hr) IV Continuous <Continuous>  dextrose 5%. 1000 milliLiter(s) (100 mL/Hr) IV Continuous <Continuous>  dextrose 50% Injectable 25 Gram(s) IV Push once  dextrose 50% Injectable 12.5 Gram(s) IV Push once  dextrose 50% Injectable 25 Gram(s) IV Push once  DULoxetine 30 milliGRAM(s) Oral daily  enoxaparin Injectable 40 milliGRAM(s) SubCutaneous every 24 hours  finasteride 5 milliGRAM(s) Oral daily  furosemide   Injectable 40 milliGRAM(s) IV Push once  glucagon  Injectable 1 milliGRAM(s) IntraMuscular once  insulin lispro (ADMELOG) corrective regimen sliding scale   SubCutaneous three times a day before meals  insulin lispro (ADMELOG) corrective regimen sliding scale   SubCutaneous at bedtime  latanoprost 0.005% Ophthalmic Solution 1 Drop(s) Both EYES at bedtime  losartan 100 milliGRAM(s) Oral daily  montelukast 10 milliGRAM(s) Oral daily  pantoprazole    Tablet 40 milliGRAM(s) Oral before breakfast  piperacillin/tazobactam IVPB.- 3.375 Gram(s) IV Intermittent once  piperacillin/tazobactam IVPB.. 3.375 Gram(s) IV Intermittent every 8 hours  simvastatin 20 milliGRAM(s) Oral at bedtime  tamsulosin 0.4 milliGRAM(s) Oral at bedtime    MEDICATIONS  (PRN):  acetaminophen     Tablet .. 650 milliGRAM(s) Oral every 6 hours PRN Temp greater or equal to 38C (100.4F), Mild Pain (1 - 3)  albuterol/ipratropium for Nebulization 3 milliLiter(s) Nebulizer every 6 hours PRN Shortness of Breath and/or Wheezing  dextrose Oral Gel 15 Gram(s) Oral once PRN Blood Glucose LESS THAN 70 milliGRAM(s)/deciliter  melatonin 3 milliGRAM(s) Oral at bedtime PRN Insomnia  ondansetron Injectable 4 milliGRAM(s) IV Push every 8 hours PRN Nausea and/or Vomiting         Vitals log        ICU Vital Signs Last 24 Hrs  T(C): 38 (16 Jan 2023 14:30), Max: 38.6 (16 Jan 2023 07:40)  T(F): 100.4 (16 Jan 2023 14:30), Max: 101.4 (16 Jan 2023 07:40)  HR: 105 (16 Jan 2023 14:30) (94 - 121)  BP: 143/72 (16 Jan 2023 14:30) (114/63 - 143/72)  BP(mean): --  ABP: --  ABP(mean): --  RR: 16 (16 Jan 2023 14:30) (16 - 18)  SpO2: 98% (16 Jan 2023 14:30) (95% - 100%)    O2 Parameters below as of 16 Jan 2023 14:30  Patient On (Oxygen Delivery Method): nasal cannula  O2 Flow (L/min): 2  O2 Concentration (%): 2             Input and Output:  I&O's Detail      Lab Data                        14.4   18.05 )-----------( 268      ( 16 Jan 2023 05:25 )             43.0     01-16    135  |  105  |  16  ----------------------------<  225<H>  3.8   |  21<L>  |  1.10    Ca    8.8      16 Jan 2023 05:25    TPro  7.3  /  Alb  3.8  /  TBili  0.6  /  DBili  x   /  AST  29  /  ALT  50  /  AlkPhos  82  01-16            Review of Systems	      Objective     Physical Examination        Pertinent Lab findings & Imaging      Ta:  NO   Adequate UO     I&O's Detail           Discussed with:     Cultures:	        Radiology      ACC: [id number] EXAM:  CT ABDOMEN AND PELVIS IC                        ACC: [id number] EXAM:  CT ANGIO CHEST PULM ART [facility]                          PROCEDURE DATE:  01/16/2023          INTERPRETATION:  Reason for Exam: Shortness of breath. chest pain.    CTA of the chest was performed from the thoracic inlet to the level of   the adrenal glands following IV contrast injection of  80 cc of Omnipaque   350. No immediate complications were reported.  MIP images were also   created and reviewed. CT of the abdomen and pelvis was also obtained.    Comparison: Chest x-ray of same date    Tubes/Lines: None    Mediastinum and Heart: Aorta and pulmonary arteries are normal in size.   Thyroid gland is unremarkable. No lymphadenopathy. No pericardial   effusion.    Lungs, Pleura, and Airways: There is no pulmonary embolus. No   consolidations, edema, effusion, or pneumothorax.    CT abdomen and pelvis:    The liver, spleen, pancreas and adrenals are unremarkable. Both kidneys   enhance symmetrically. No stones or hydronephrosis. Intraabdominal   vasculature is within normal limits. No lymphadenopathy.    Pelvic organs within normal limits. No evidence of bowel obstruction. The   appendix is not clearly seen. No acute bony abnormality.    IMPRESSION:    CTA CHEST: No pulmonary embolus.    CT abdomen and pelvis: No acute findings    --- End of Report ---            GILBERTO SALGADO MD; Attending Radiologist  This document has been electronically signed. Jan 16 2023 12:48PM

## 2023-01-16 NOTE — H&P ADULT - NSHPREVIEWOFSYSTEMS_GEN_ALL_CORE
CONSTITUTIONAL: denies fever, chills, fatigue, weakness  HEENT:denies dizziness or lightheadedness, blurred vision, sore throat  SKIN: denies new lesions, rash, itching  CARDIOVASCULAR: denies chest pain, chest pressure, palpitations  RESPIRATORY: denies shortness of breath, cough, sputum production, wheezing  GASTROINTESTINAL: denies nausea, vomiting, diarrhea, constipation, abdominal pain, bloody stools  GENITOURINARY: denies painful urination, increased frequency, urgency, or bloody urine  NEUROLOGICAL: denies numbness, headache, focal weakness  MUSCULOSKELETAL: denies new joint pain, muscle aches  HEMATOLOGIC: denies gross bleeding, bruising  LYMPHATICS: denies enlarged lymph nodes, extremity swelling  PSYCHIATRIC: denies recent changes in anxiety, depression  ENDOCRINOLOGIC: denies sweating, cold or heat intolerance CONSTITUTIONAL: denies fever, chills, fatigue, weakness  HEENT: denies dizziness or lightheadedness, blurred vision, sore throat  SKIN: denies new lesions, rash, itching  CARDIOVASCULAR: denies chest pain, chest pressure, palpitations  RESPIRATORY: denies shortness of breath, cough, sputum production, wheezing  GASTROINTESTINAL: denies nausea, vomiting, diarrhea, constipation, abdominal pain, bloody stools  GENITOURINARY: denies painful urination, increased frequency, urgency, or bloody urine  NEUROLOGICAL: denies numbness, headache, focal weakness  MUSCULOSKELETAL: denies new joint pain, muscle aches  HEMATOLOGIC: denies gross bleeding, bruising  LYMPHATICS: denies enlarged lymph nodes, extremity swelling  PSYCHIATRIC: denies recent changes in anxiety, depression  ENDOCRINOLOGIC: denies sweating, cold or heat intolerance CONSTITUTIONAL: denies fever, chills, fatigue, weakness  HEENT: denies dizziness or lightheadedness, blurred vision  SKIN: denies new lesions, rash, itching  CARDIOVASCULAR: denies chest pain, chest pressure, palpitations  RESPIRATORY: + SOB, + cough. No sputum production  GASTROINTESTINAL: + Diarrhea. Denies nausea, vomiting, constipation, abdominal pain, bloody stools  GENITOURINARY: denies painful urination, increased frequency, urgency, or bloody urine  NEUROLOGICAL: denies numbness, headache, focal weakness  MUSCULOSKELETAL: denies new joint pain, muscle aches  HEMATOLOGIC: denies gross bleeding, bruising

## 2023-01-16 NOTE — ED PROVIDER NOTE - PROGRESS NOTE DETAILS
Stroke notification was activated upon initial evaluation.  NIH stroke scale was 2.  CT head report was called in by radiologist at 6:03 AM as negative for intracranial hemorrhage or acute CVA.  Case was discussed with Dr. Park, stroke neurologist via the transfer center at 6:10 AM who advised patient is not a thrombolytic candidate as severity of symptoms is too mild and the last known well was at 9 PM last night.  To continue supportive management.  Patient's son is at bedside he was informed of all the results.  At approximately 7 AM patient was noted to have a temperature of 101.  His white blood cell count is also elevated at 18,000.  Sepsis work-up is initiated.  Chest x-ray as interpreted by me does not show any obvious infiltrate.  EKG is a sinus tachycardia at 123 without any acute ischemic abnormalities however there is significant motion artifact on the EKG which is Geers any abnormal findings.  EKG was interpreted by me.

## 2023-01-16 NOTE — ED ADULT TRIAGE NOTE - PATIENT ON (OXYGEN DELIVERY METHOD)
PAST MEDICAL HISTORY:  Hillman esophagus     CAD (coronary artery disease)     Chronic renal failure     Diabetes type 2    GERD (gastroesophageal reflux disease)     History of renal carcinoma     Hypertension     Kidney stones     Other diseases of larynx      room air

## 2023-01-16 NOTE — H&P ADULT - PROBLEM SELECTOR PLAN 2
- Continue on Zosyn   - Start on IVF @ 120cc/hr   - F/U Blood cultures x2 and Urine culture   - Tylenol PRN for fever   - MRSA, f/u   - Infectious Disease (Dr. Mcmullen), f/u recs Acute onset R sided weakness, now resolved, d/w tele neuro in ED - sx mild and out of window for intervention  Admitted 9/2022 for TIA to [facility]  CT head negative for acute bleed; mild ventriculomegaly without significant change from MRI  brain dated 9/26/2022   CT perfusion studies - no carotid stenosis  TTE ordered  HbA1c and lipid panel  Fall and aspiration precautions  Bedside swallow eval passed - S&S ordered  ASA started and statin continued  Admit to telemetry for cardiac monitoring  PT evaluation  Neurochecks q 4 hrs  Neurology consult (Dr. Martinez) consulted, f/u rec Acute onset R sided weakness, now resolved, d/w tele neuro in ED - sx mild and out of window for intervention  Admitted 9/2022 for TIA to [facility]  CT head negative for acute bleed; mild ventriculomegaly without significant change from MRI  brain dated 9/26/2022   CT perfusion studies - no carotid stenosis  MR head non con - f/u  TTE - f/u  HbA1c and lipid panel  Fall and aspiration precautions  Bedside swallow eval passed - S&S ordered  ASA started and statin continued  Admit to telemetry for cardiac monitoring  PT evaluation  Neurochecks q 4 hrs  Neurology consult (Dr. Martinez) consulted, f/u rec Acute onset R sided weakness, now resolved, d/w tele neuro in ED - sx mild and out of window for intervention  Admitted 9/2022 for TIA to [facility]  CT head negative for acute bleed; mild ventriculomegaly without significant change from MRI  brain dated 9/26/2022   CT perfusion studies - no carotid stenosis  MR head non con - f/u  TTE - f/u  HbA1c and lipid panel  Fall and aspiration precautions  Bedside swallow eval passed - S&S ordered  ASA started and statin continued  Admit to telemetry for cardiac monitoring  PT evaluation  Neurochecks q 4 hrs  Neurology consult (Dr. Novak consulted, f/u rec

## 2023-01-16 NOTE — ED PROVIDER NOTE - OBJECTIVE STATEMENT
82-year-old gentleman with history of hypertension, diabetes, hydrocephalus, hyperlipidemia history of prior stroke versus TIA family is unsure of who was brought in by EMS as a stroke alert notification.  Patient evaluated immediately upon arrival.  He is awake and alert speaks Vietnamese, a Vietnamese  through the language line was used (Jasmin, ID number [id number]) interpreted for the patient.  Patient was having difficulty either hearing the  or understanding what the  was asking.  When patient's son arrived and he was able to translate) patient was able to respond more appropriately.  As per the son patient went to bed at 9 PM when he was in his usual state of health.  At 3 AM they heard patient awake and coughing with an unusual type of a cough.  Son reports he went into the room to check up on him.  Patient was having difficulty getting out of the bed and while coughing he had urinated and defecated on himself as well.  They noticed that he was having difficulty getting out of bed and they thought they appreciated some weakness on the right side of his body.  On arrival to ED patient had an episode of emesis.  His oxygen saturations were about 91 to 92%.  Unclear if patient also might of aspirated however he was sitting upright when he vomited.  Patient was placed on some nasal cannula with improvement in his saturations.  Patient denies chest pain or abdominal pain.

## 2023-01-16 NOTE — H&P ADULT - HISTORY OF PRESENT ILLNESS
CHARTING IN PROGRESS    Patient is an 83 y/o M w/ PMH of HTN, HLD, history of prior TIA (09/2022 adm [facility]), TII DM, hydrocephalus, BPH, and asthma BIBA for R sided weakness and cough. Patient Turkmen speaking, son translated. Per the son patient went to sleep at 9PM, last known well. At 3 AM the patient was awake and coughing with an unusual type of cough. The son checked on the patient who was noted to have difficulty arising from bed with some R sided weakness and during a coughing fit had urinated and defecated on himself. Upon arrival to the ED the patient had an episode of emesis, O2 sats about 91-92%; improvement noted on NC. Concern for possible aspiration though per charting patient was sitting upright when he vomited.        In the ED:  VS: T 97.7, , 138/73, RR 16, 95% on RA  S/p: Tylenol x 1, vanc + zosyn, 1 L NS bolus x 2  EKG:  Labs: WBC 18; Lacte 2.8; ;   Imaging:  CT Head w/o: No evidence of acute intracranial hemorrhage, midline shift or CT evidence of acute territorial infarct. Mild ventriculomegaly without significant change from the prior MRI of the brain dated 9/26/2022.   CT PERFUSION: No core infarct or penumbra of ischemic tissue is identified by CT perfusion.  CT ANGIOGRAPHY NECK: Patent cervical vasculature. No hemodynamically significant carotid stenosis by NASCET criteria or flow-limiting vertebral artery stenosis.  No evidence of dissection.  CT ANGIOGRAPHY BRAIN: No vessel occlusion, flow-limiting stenosis or aneurysm.    IN THE ED:  Temp   F , HR  , BP  /  ,RR  , SpO2  S/P  EKG:  Labs significant for  Imaging:  Patient is an 83 y/o M w/ PMH of HTN, HLD, history of prior TIA (09/2022 [facility]), TII DM, hydrocephalus, BPH, and asthma BIBA for R sided weakness and cough. Patient English speaking, son translated. Per the son patient went to sleep at 9PM, last known well. At 3 AM the patient was awake and coughing with an unusual type of cough. The son checked on the patient who was noted to have difficulty arising from bed with some R sided weakness and during a coughing fit had urinated and defecated on himself. Upon arrival to the ED the patient had an episode of emesis, O2 sats about 91-92%; improvement noted on NC. Concern for possible aspiration though per charting patient was sitting upright when he vomited.        In the ED:  VS: T 97.7, , 138/73, RR 16, 95% on RA  S/p: Tylenol x 1, vanc + zosyn, 1 L NS bolus x 2  EKG:  Labs: WBC 18; Lacte 2.8; ;   Imaging:  CT Head w/o: No evidence of acute intracranial hemorrhage, midline shift or CT evidence of acute territorial infarct. Mild ventriculomegaly without significant change from the prior MRI of the brain dated 9/26/2022.   CT PERFUSION: No core infarct or penumbra of ischemic tissue is identified by CT perfusion.  CT ANGIOGRAPHY NECK: Patent cervical vasculature. No hemodynamically significant carotid stenosis by NASCET criteria or flow-limiting vertebral artery stenosis.  No evidence of dissection.  CT ANGIOGRAPHY BRAIN: No vessel occlusion, flow-limiting stenosis or aneurysm.    IN THE ED:  Temp   F , HR  , BP  /  ,RR  , SpO2  S/P  EKG:  Labs significant for  Imaging:  Patient is an 81 y/o M w/ PMH of HTN, HLD, history of prior TIA (09/2022 [facility]), TII DM, hydrocephalus, BPH, and asthma BIBA for R sided weakness and cough. Patient Arabic speaking, son translated. Per the son patient with non-productive cough x 3 days. Went to sleep at 9PM on 1/15, last known well. At 3 AM the patient was awake and coughing. The son checked on the patient who was noted to have difficulty arising from bed with some R sided weakness, experiencing a coughing fit with SOB, rigors and appeared to have had urinated and defecated on himself. Son endorsed patient had an inability to ambulate at that point. Of note this was the patient's first episode of incontinence. Patient w/ history of spinal injection for back pain ~ 1 week ago. Upon arrival to the ED the patient had an episode of emesis, O2 sats about 91-92%; improvement noted on NC. Concern for possible aspiration though per charting patient was sitting upright when he vomited, NBNB emesis. No sick contacts per son though patient does go to  M,W,F.    In the ED:  VS: T 97.7, , 138/73, RR 16, 95% on RA  S/p: Tylenol x 1, vanc + zosyn, 1 L NS bolus x 2  EKG: Sinus tach, rate 123, LAD, motion artifact  Labs: WBC 18; Lacte 2.8;     Imaging:  CXR: CXR clear  CT Head w/o: No evidence of acute intracranial hemorrhage, midline shift or CT evidence of acute territorial infarct. Mild ventriculomegaly without significant change from the prior MRI of the brain dated 9/26/2022.   CT PERFUSION: No core infarct or penumbra of ischemic tissue is identified by CT perfusion.  CT ANGIOGRAPHY NECK: Patent cervical vasculature. No hemodynamically significant carotid stenosis by NASCET criteria or flow-limiting vertebral artery stenosis.  No evidence of dissection.  CT ANGIOGRAPHY BRAIN: No vessel occlusion, flow-limiting stenosis or aneurysm. Patient is an 83 y/o M w/ PMH of HTN, HLD, history of prior TIA (09/2022 adm to [facility]), Type 2 Diabetes Mellitus, hydrocephalus, BPH, and asthma BIBA for R sided weakness and cough. Patient Yi speaking, son translated. Per the son patient with non-productive cough x 3 days. Went to sleep at 9PM on 1/15, last known well. At 3 AM the patient was awake and coughing. The son checked on the patient who was noted to have difficulty arising from bed with some R sided weakness, experiencing a coughing fit with SOB, rigors and appeared to have had urinated and defecated on himself. Son endorsed patient had an inability to ambulate at that point. Of note this was the patient's first episode of incontinence. Patient w/ history of spinal injection for back pain ~ 1 week ago. Upon arrival to the ED the patient had an episode of emesis, O2 sats about 91-92%; improvement noted on NC. Concern for possible aspiration though per charting patient was sitting upright when he vomited, NBNB emesis. No sick contacts per son though patient does go to  M,W,F.    In the ED:  VS: T 97.7, , 138/73, RR 16, 95% on RA  S/p: Tylenol x 1, vanc + zosyn, 1 L NS bolus x 2  EKG: Sinus tach, rate 123, LAD, motion artifact  Labs: WBC 18; Lacte 2.8;     Imaging:  CXR: CXR clear  CT Head w/o: No evidence of acute intracranial hemorrhage, midline shift or CT evidence of acute territorial infarct. Mild ventriculomegaly without significant change from the prior MRI of the brain dated 9/26/2022.   CT PERFUSION: No core infarct or penumbra of ischemic tissue is identified by CT perfusion.  CT ANGIOGRAPHY NECK: Patent cervical vasculature. No hemodynamically significant carotid stenosis by NASCET criteria or flow-limiting vertebral artery stenosis.  No evidence of dissection.  CT ANGIOGRAPHY BRAIN: No vessel occlusion, flow-limiting stenosis or aneurysm.

## 2023-01-16 NOTE — H&P ADULT - NSHPSOCIALHISTORY_GEN_ALL_CORE
Lives at home with son  ADLs: Independent with adls  Ambulates ____  Alcohol: Social EtOH   Tobacco: Former smoker, quit 5 years ago. States smoked 10 cigarettes a day for "years", unable to quantify number of years   Drugs: None  Vaccine: Lives at home with son  ADLs: Independent with adls  Ambulates independently w/o assistance  Alcohol: Social EtOH   Tobacco: Former smoker, quit 5 years ago. States smoked 10 cigarettes a day for "years", unable to quantify number of years   Drugs: None  Vaccine: Covid x 2, 3 boosters. Influenza. Up to date on vaccines.

## 2023-01-16 NOTE — ED PROVIDER NOTE - CONSIDERATION OF ADMISSION OBSERVATION
Consideration of Admission/Observation Pt requires inpt admission for stroke workup and sepsis treatment

## 2023-01-16 NOTE — ED ADULT NURSE NOTE - TEMPLATE
Baby active. Occasional contractions but none regular. Lab showed slightly low hemoglobin. I recommended iron every other day with PNV daily. FM, PTL signs, and pre eclampsia symptoms were reviewed. Follow up two weeks. Neuro

## 2023-01-16 NOTE — H&P ADULT - CONVERSATION DETAILS
Discussed with son who translated conversation the role of DNI/DNI. Son to discuss with patient and determine GOC.

## 2023-01-16 NOTE — CONSULT NOTE ADULT - SUBJECTIVE AND OBJECTIVE BOX
[facility], Division of Infectious Diseases  LAUREL Cobb S. Shah, Y. Patel, G. [facility]  [phone]    DARIELA KINCAID  82y, Male  [id number]    HPI--  HPI:  Patient is an 81 y/o M w/ PMH of HTN, HLD, history of prior TIA (2022 adm to [facility]), Type 2 Diabetes Mellitus, hydrocephalus, BPH, and asthma BIBA for R sided weakness and cough. Patient Italian speaking, son translated. Per the son patient with non-productive cough x 3 days. Went to sleep at 9PM on 1/15, last known well. At 3 AM the patient was awake and coughing. The son checked on the patient who was noted to have difficulty arising from bed with some R sided weakness, experiencing a coughing fit with SOB, rigors and appeared to have had urinated and defecated on himself. Son endorsed patient had an inability to ambulate at that point. Of note this was the patient's first episode of incontinence. Patient w/ history of spinal injection for back pain ~ 1 week ago. Upon arrival to the ED the patient had an episode of emesis, O2 sats about 91-92%; improvement noted on NC. Concern for possible aspiration though per charting patient was sitting upright when he vomited, NBNB emesis. No sick contacts per son though patient does go to  M,W,F.    In the ED:  VS: T 97.7, , 138/73, RR 16, 95% on RA  S/p: Tylenol x 1, vanc + zosyn, 1 L NS bolus x 2  EKG: Sinus tach, rate 123, LAD, motion artifact  Labs: WBC 18; Lacte 2.8;     Imaging:  CXR: CXR clear  CT Head w/o: No evidence of acute intracranial hemorrhage, midline shift or CT evidence of acute territorial infarct. Mild ventriculomegaly without significant change from the prior MRI of the brain dated 2022.   CT PERFUSION: No core infarct or penumbra of ischemic tissue is identified by CT perfusion.  CT ANGIOGRAPHY NECK: Patent cervical vasculature. No hemodynamically significant carotid stenosis by NASCET criteria or flow-limiting vertebral artery stenosis.  No evidence of dissection.  CT ANGIOGRAPHY BRAIN: No vessel occlusion, flow-limiting stenosis or aneurysm. (2023 08:36)        Active Medications--  acetaminophen     Tablet .. 650 milliGRAM(s) Oral every 6 hours PRN  acetaZOLAMIDE    Tablet 125 milliGRAM(s) Oral daily  albuterol/ipratropium for Nebulization 3 milliLiter(s) Nebulizer every 6 hours PRN  aspirin  chewable 81 milliGRAM(s) Oral daily  dextrose 5%. 1000 milliLiter(s) IV Continuous <Continuous>  dextrose 5%. 1000 milliLiter(s) IV Continuous <Continuous>  dextrose 50% Injectable 25 Gram(s) IV Push once  dextrose 50% Injectable 12.5 Gram(s) IV Push once  dextrose 50% Injectable 25 Gram(s) IV Push once  dextrose Oral Gel 15 Gram(s) Oral once PRN  DULoxetine 30 milliGRAM(s) Oral daily  enoxaparin Injectable 40 milliGRAM(s) SubCutaneous every 24 hours  finasteride 5 milliGRAM(s) Oral daily  glucagon  Injectable 1 milliGRAM(s) IntraMuscular once  insulin lispro (ADMELOG) corrective regimen sliding scale   SubCutaneous three times a day before meals  insulin lispro (ADMELOG) corrective regimen sliding scale   SubCutaneous at bedtime  latanoprost 0.005% Ophthalmic Solution 1 Drop(s) Both EYES at bedtime  losartan 100 milliGRAM(s) Oral daily  melatonin 3 milliGRAM(s) Oral at bedtime PRN  montelukast 10 milliGRAM(s) Oral daily  ondansetron Injectable 4 milliGRAM(s) IV Push every 8 hours PRN  pantoprazole    Tablet 40 milliGRAM(s) Oral before breakfast  piperacillin/tazobactam IVPB.- 3.375 Gram(s) IV Intermittent once  piperacillin/tazobactam IVPB.. 3.375 Gram(s) IV Intermittent every 8 hours  simvastatin 20 milliGRAM(s) Oral at bedtime  tamsulosin 0.4 milliGRAM(s) Oral at bedtime    Antimicrobials:   piperacillin/tazobactam IVPB.- 3.375 Gram(s) IV Intermittent once  piperacillin/tazobactam IVPB.. 3.375 Gram(s) IV Intermittent every 8 hours    Immunologic:     ROS:  CONSTITUTIONAL: No fevers or chills. No weakness or headache. No weight changes.  EYES/ENT: No visual or hearing changes. No sore throat or throat pain .  NECK: No pain or stiffness  RESPIRATORY: No cough, wheezing, or hemoptysis. No shortness of breath  CARDIOVASCULAR: No chest pain or palpitations  GASTROINTESTINAL: No abdominal pain. No nausea or vomiting. No diarrhea or constipation.  GENITOURINARY: No dysuria, frequency or hematuria  NEUROLOGICAL: No numbness or weakness  SKIN: No itching or rashes  PSYCHIATRIC: Pleasant. Appropriate affect    Allergies: No Known Allergies    PMH -- Diabetes    Hypertension    Hypercholesteremia    Asthma    Benign prostatic hyperplasia      PSH -- No significant past surgical history      FH -- No pertinent family history in first degree relatives    FH: brain aneurysm      Social History --  EtOH: denies   Tobacco: denies   Drug Use: denies     Travel/Environmental/Occupational History:    Physical Exam--  Vital Signs Last 24 Hrs  T(F): 99 (2023 10:01), Max: 101.4 (2023 07:40)  HR: 94 (2023 10:01) (94 - 121)  BP: 122/68 (2023 10:01) (114/63 - 138/73)  RR: 16 (2023 10:01) (16 - 16)  SpO2: 98% (2023 10:01) (95% - 100%)  General: nontoxic-appearing, no acute distress  HEENT: NC/AT, EOMI, anicteric, conjunctiva pink and moist, oropharynx clear, dentition fair  Neck: Not rigid. No sense of mass. No LAD  Lungs: Clear bilaterally without rales, wheezing or rhonchi  Heart: Regular rate and rhythm. No murmur, rub or gallop.  Abdomen: Soft. Nondistended. Nontender. Bowel sounds present. No organomegaly.  Back: No spinal tenderness. No costovertebral angle tenderness.  Extremities: No cyanosis or clubbing. No edema.   Skin: Warm. Dry. Good turgor. No rash. No vasculitic stigmata.  Psychiatric: Appropriate affect and mood for situation.   Lines:    Laboratory & Imaging Data:  CBC:                       14.4   18.05 )-----------( 268      ( 2023 05:25 )             43.0     CMP: -16    135  |  105  |  16  ----------------------------<  225<H>  3.8   |  21<L>  |  1.10    Ca    8.8      2023 05:25    TPro  7.3  /  Alb  3.8  /  TBili  0.6  /  DBili  x   /  AST  29  /  ALT  50  /  AlkPhos  82  -    LIVER FUNCTIONS - ( 2023 05:25 )  Alb: 3.8 g/dL / Pro: 7.3 g/dL / ALK PHOS: 82 U/L / ALT: 50 U/L / AST: 29 U/L / GGT: x           Urinalysis Basic - ( 2023 06:25 )    Color: Pale Yellow / Appearance: Clear / S.005 / pH: x  Gluc: x / Ketone: Trace  / Bili: Negative / Urobili: Negative   Blood: x / Protein: Negative / Nitrite: Negative   Leuk Esterase: Negative / RBC: x / WBC x   Sq Epi: x / Non Sq Epi: x / Bacteria: x        Microbiology: reviewed        Radiology: reviewed    < from: CT Angio Chest PE Protocol w/ IV Cont (23 @ 12:17) >    ACC: [id number] EXAM:  CT ABDOMEN AND PELVIS                         ACC: [id number] EXAM:  CT ANGIO CHEST PULM [facility]                          PROCEDURE DATE:  2023          INTERPRETATION:  Reason for Exam: Shortness of breath. chest pain.    CTA of the chest was performed from the thoracic inlet to the level of   the adrenal glands following IV contrast injection of  80 cc of Omnipaque   350. No immediate complications were reported.  MIP images were also   created and reviewed. CT ofthe abdomen and pelvis was also obtained.    Comparison: Chest x-ray of same date    Tubes/Lines: None    Mediastinum and Heart: Aorta and pulmonary arteries are normal in size.   Thyroid gland is unremarkable. No lymphadenopathy. No pericardial   effusion.    Lungs, Pleura, and Airways: There is no pulmonary embolus. No   consolidations, edema, effusion, or pneumothorax.    CT abdomen and pelvis:    The liver, spleen, pancreas and adrenals are unremarkable. Both kidneys   enhance symmetrically. No stones or hydronephrosis. Intraabdominal   vasculature is within normal limits. No lymphadenopathy.    Pelvic organs within normal limits. No evidence of bowel obstruction. The   appendix is not clearly seen. No acute bony abnormality.    IMPRESSION:    CTA CHEST: No pulmonary embolus.    CT abdomen and pelvis: No acute findings    --- End of Report ---            GILBERTO SALGADO MD; Attending Radiologist  This document has been electronically signed. 2023 12:48PM    < end of copied text >   [facility], Division of Infectious Diseases  LAUREL Cobb S. Shah, Y. Patel, G. [facility]  [phone]    DARIELA KINCAID  82y, Male  [id number]    HPI--  HPI:  Patient is an 83 y/o M w/ PMH of HTN, HLD, history of prior TIA (2022 adm to [facility]), Type 2 Diabetes Mellitus, hydrocephalus, BPH, and asthma BIBA for R sided weakness and cough. Patient Macedonian speaking, son translated. Per the son patient with non-productive cough x 3 days. Went to sleep at 9PM on 1/15, last known well. At 3 AM the patient was awake and coughing. The son checked on the patient who was noted to have difficulty arising from bed with some R sided weakness, experiencing a coughing fit with SOB, rigors and appeared to have had urinated and defecated on himself. Son endorsed patient had an inability to ambulate at that point. Of note this was the patient's first episode of incontinence. Patient w/ history of spinal injection for back pain ~ 1 week ago. Upon arrival to the ED the patient had an episode of emesis, O2 sats about 91-92%; improvement noted on NC. Concern for possible aspiration though per charting patient was sitting upright when he vomited, NBNB emesis. No sick contacts per son though patient does go to  M,W,F.    In the ED:  VS: T 97.7, , 138/73, RR 16, 95% on RA  S/p: Tylenol x 1, vanc + zosyn, 1 L NS bolus x 2  EKG: Sinus tach, rate 123, LAD, motion artifact  Labs: WBC 18; Lacte 2.8;     Imaging:  CXR: CXR clear  CT Head w/o: No evidence of acute intracranial hemorrhage, midline shift or CT evidence of acute territorial infarct. Mild ventriculomegaly without significant change from the prior MRI of the brain dated 2022.   CT PERFUSION: No core infarct or penumbra of ischemic tissue is identified by CT perfusion.  CT ANGIOGRAPHY NECK: Patent cervical vasculature. No hemodynamically significant carotid stenosis by NASCET criteria or flow-limiting vertebral artery stenosis.  No evidence of dissection.  CT ANGIOGRAPHY BRAIN: No vessel occlusion, flow-limiting stenosis or aneurysm. (2023 08:36)    Pt seen at bedside  on NRB  Feeling better      Active Medications--  acetaminophen     Tablet .. 650 milliGRAM(s) Oral every 6 hours PRN  acetaZOLAMIDE    Tablet 125 milliGRAM(s) Oral daily  albuterol/ipratropium for Nebulization 3 milliLiter(s) Nebulizer every 6 hours PRN  aspirin  chewable 81 milliGRAM(s) Oral daily  dextrose 5%. 1000 milliLiter(s) IV Continuous <Continuous>  dextrose 5%. 1000 milliLiter(s) IV Continuous <Continuous>  dextrose 50% Injectable 25 Gram(s) IV Push once  dextrose 50% Injectable 12.5 Gram(s) IV Push once  dextrose 50% Injectable 25 Gram(s) IV Push once  dextrose Oral Gel 15 Gram(s) Oral once PRN  DULoxetine 30 milliGRAM(s) Oral daily  enoxaparin Injectable 40 milliGRAM(s) SubCutaneous every 24 hours  finasteride 5 milliGRAM(s) Oral daily  glucagon  Injectable 1 milliGRAM(s) IntraMuscular once  insulin lispro (ADMELOG) corrective regimen sliding scale   SubCutaneous three times a day before meals  insulin lispro (ADMELOG) corrective regimen sliding scale   SubCutaneous at bedtime  latanoprost 0.005% Ophthalmic Solution 1 Drop(s) Both EYES at bedtime  losartan 100 milliGRAM(s) Oral daily  melatonin 3 milliGRAM(s) Oral at bedtime PRN  montelukast 10 milliGRAM(s) Oral daily  ondansetron Injectable 4 milliGRAM(s) IV Push every 8 hours PRN  pantoprazole    Tablet 40 milliGRAM(s) Oral before breakfast  piperacillin/tazobactam IVPB.- 3.375 Gram(s) IV Intermittent once  piperacillin/tazobactam IVPB.. 3.375 Gram(s) IV Intermittent every 8 hours  simvastatin 20 milliGRAM(s) Oral at bedtime  tamsulosin 0.4 milliGRAM(s) Oral at bedtime    Antimicrobials:   piperacillin/tazobactam IVPB.- 3.375 Gram(s) IV Intermittent once  piperacillin/tazobactam IVPB.. 3.375 Gram(s) IV Intermittent every 8 hours    Immunologic:     ROS:  unable to obtain    Allergies: No Known Allergies    PMH -- Diabetes    Hypertension    Hypercholesteremia    Asthma    Benign prostatic hyperplasia      PSH -- No significant past surgical history      FH -- No pertinent family history in first degree relatives    FH: brain aneurysm      Social History --  EtOH: denies   Tobacco: denies   Drug Use: denies     Travel/Environmental/Occupational History:    Physical Exam--  Vital Signs Last 24 Hrs  T(F): 99 (2023 10:01), Max: 101.4 (2023 07:40)  HR: 94 (2023 10:01) (94 - 121)  BP: 122/68 (2023 10:01) (114/63 - 138/73)  RR: 16 (2023 10:01) (16 - 16)  SpO2: 98% (2023 10:01) (95% - 100%)  General: mild resp distress, elderly M  HEENT: NC/AT, EOMI, anicteric  Lungs: decreased breath sounds on NRB  Heart: Regular rate and rhythm. No murmur, rub or gallop.  Abdomen: Soft. Nondistended. Nontender.  Extremities: No cyanosis or clubbing. No edema.   Skin: Warm. Dry. Good turgor. No rash.    Laboratory & Imaging Data:  CBC:                       14.4   18.05 )-----------( 268      ( 2023 05:25 )             43.0     CMP:     135  |  105  |  16  ----------------------------<  225<H>  3.8   |  21<L>  |  1.10    Ca    8.8      2023 05:25    TPro  7.3  /  Alb  3.8  /  TBili  0.6  /  DBili  x   /  AST  29  /  ALT  50  /  AlkPhos  82      LIVER FUNCTIONS - ( 2023 05:25 )  Alb: 3.8 g/dL / Pro: 7.3 g/dL / ALK PHOS: 82 U/L / ALT: 50 U/L / AST: 29 U/L / GGT: x           Urinalysis Basic - ( 2023 06:25 )    Color: Pale Yellow / Appearance: Clear / S.005 / pH: x  Gluc: x / Ketone: Trace  / Bili: Negative / Urobili: Negative   Blood: x / Protein: Negative / Nitrite: Negative   Leuk Esterase: Negative / RBC: x / WBC x   Sq Epi: x / Non Sq Epi: x / Bacteria: x        Microbiology: reviewed        Radiology: reviewed    < from: CT Angio Chest PE Protocol w/ IV Cont (23 @ 12:17) >    ACC: [id number] EXAM:  CT ABDOMEN AND PELVIS IC                        ACC: [id number] EXAM:  CT ANGIO CHEST [facility]                          PROCEDURE DATE:  2023          INTERPRETATION:  Reason for Exam: Shortness of breath. chest pain.    CTA of the chest was performed from the thoracic inlet to the level of   the adrenal glands following IV contrast injection of  80 cc of Omnipaque   350. No immediate complications were reported.  MIP images were also   created and reviewed. CT ofthe abdomen and pelvis was also obtained.    Comparison: Chest x-ray of same date    Tubes/Lines: None    Mediastinum and Heart: Aorta and pulmonary arteries are normal in size.   Thyroid gland is unremarkable. No lymphadenopathy. No pericardial   effusion.    Lungs, Pleura, and Airways: There is no pulmonary embolus. No   consolidations, edema, effusion, or pneumothorax.    CT abdomen and pelvis:    The liver, spleen, pancreas and adrenals are unremarkable. Both kidneys   enhance symmetrically. No stones or hydronephrosis. Intraabdominal   vasculature is within normal limits. No lymphadenopathy.    Pelvic organs within normal limits. No evidence of bowel obstruction. The   appendix is not clearly seen. No acute bony abnormality.    IMPRESSION:    CTA CHEST: No pulmonary embolus.    CT abdomen and pelvis: No acute findings    --- End of Report ---            GILBERTO SALGADO MD; Attending Radiologist  This document has been electronically signed. 2023 12:48PM    < end of copied text >

## 2023-01-16 NOTE — H&P ADULT - PROBLEM SELECTOR PLAN 1
- CT head reviewed  - TTE ordered  - HbA1c and lipid panel  - fall and aspiration precautions  - bedside swallow eval passed  - ASA and statin started  - PT evaluation  - Neurochecks q 4 hrs  - Neurology consult (Dr. Martinez) consulted, f/u recs Cough x 3 days w/ overnight episode of coughing, SOB, weakness, rigors - concern for aspiration PNA vs. less likely PE or meningitis  Spinal inj ~ 1 week ago for back pain, alert and oriented, negative kernig's - unlikely meningitis  Tachy, febrile, SOB on NC - no pleurisy - D-dimer - F/u  Meets SIRS criteria WBC 18, T 101.4, , RR 16 - Source: Unknown  CXR wet read clear - f/u official read  Lactate - 2.8  Sepsis protocol  UA - negative  Continue vanc/zosyn  Tylenol PRN for fever   Start on IVF @ 120cc/hr   BCx, UCx, MRSA PCR, legionella, strep - f/u  Trend wbc, fever, lactate  Infectious Disease (Dr. Mcmullen), f/u recs Cough x 3 days w/ overnight episode of coughing, SOB, weakness, rigors - concern for aspiration PNA vs. less likely PE or meningitis  Spinal inj ~ 1 week ago for back pain, alert and oriented, negative kernig's - unlikely meningitis  Meets SIRS criteria WBC 18, T 101.4, , RR 16 - Source: Unknown  CXR wet read clear - f/u official read  CT chest w/ con to evaluate for PNA and PE though low suspicion for PE  Lactate - 2.8  Sepsis protocol  UA - negative  Continue vanc/zosyn  Tylenol PRN for fever   Start on IVF @ 120cc/hr   BCx, UCx, MRSA PCR, legionella, strep - f/u  Trend wbc, fever, lactate  Infectious Disease (Dr. Mcmullen), f/u recs Cough x 3 days w/ overnight episode of coughing, SOB, weakness, rigors - concern for aspiration PNA vs. less likely PE. Do not suspect meningitis  Spinal inj ~ 1 week ago for back pain, alert and oriented, negative kernig's - unlikely meningitis  Meets SIRS criteria WBC 18, T 101.4, , RR 16 - Source: Unknown  CXR wet read clear - f/u official read  CT chest w/ con to evaluate for PNA and PE though lower suspicion for PE  Lactate - 2.8  Sepsis protocol  UA - negative  Continue zosyn  Tylenol PRN for fever   BCx, UCx, MRSA PCR, legionella, strep - f/u  Trend wbc, fever, lactate  Infectious Disease (Dr. Waggoner] f/u recs

## 2023-01-16 NOTE — ED ADULT NURSE NOTE - OBJECTIVE STATEMENT
pt BIBA with stroke code notification.  upon arrival noted to have left sided facial droop.  pt is Amharic speaking predominantly, went to bed at 2100 last night at baseline, woke this morning with left sided droop.

## 2023-01-16 NOTE — ED PROVIDER NOTE - CRANIAL NERVE AND PUPILLARY EXAM
cranial nerves 2-12 intact/cough reflex intact/central and peripheral vision intact/extra-ocular movements intact/tongue is midline

## 2023-01-16 NOTE — H&P ADULT - PROBLEM SELECTOR PLAN 4
Not on home insulin  Hold oral diabetes meds - metformin, Januvia  AM A1C  LDISS  Finger sticks, Consistent carb diet  Hypoglycemic protocol Diabetes mellitus type 2  Not on home insulin  Hold oral diabetes meds - metformin, Januvia  AM A1C  correctional insulin as ordered  Finger sticks, Consistent carb diet  Hypoglycemic protocol

## 2023-01-16 NOTE — H&P ADULT - ATTENDING COMMENTS
81 y/o M w/ PMH of HTN, HLD, history of prior TIA (09/2022 adm to [facility]), type 2 diabetes mellitus, hydrocephalus, BPH, and asthma BIBA for R sided weakness and cough meets sepsis criteria, admitted for TIA and sepsis workup.    HPI as above.     T(C): 37.2 (01-16-23 @ 10:01), Max: 38.6 (01-16-23 @ 07:40)  HR: 94 (01-16-23 @ 10:01) (94 - 121)  BP: 122/68 (01-16-23 @ 10:01) (114/63 - 138/73)  RR: 16 (01-16-23 @ 10:01) (16 - 16)  SpO2: 98% (01-16-23 @ 10:01) (95% - 100%)  Wt(kg): --    Physical Exam:   GENERAL: well-groomed, well-developed, NAD  HEENT: head NC/AT; EOM intact, PERRLA, conjunctiva & sclera clear; hearing grossly intact, moist mucous membranes  NECK: supple, no JVD  RESPIRATORY: decreased breath sounds b/l lung bases, no wheezing  CARDIOVASCULAR: S1&S2, RRR, no murmurs or gallops  ABDOMEN: soft, non-tender, non-distended, + Bowel sounds x4 quadrants, no guarding, rebound or rigidity  MUSCULOSKELETAL:  no clubbing, cyanosis or edema of all 4 extremities  LYMPH: no cervical lymphadenopathy  VASCULAR: Radial pulses 2+ bilaterally, no varicose veins   SKIN: warm and dry, color normal  NEUROLOGIC: AA&O X3, CN2-12 intact w/ no focal deficits, no sensory loss, motor Strength 5/5 in UE & LE B/L  Psych: Normal mood and affect, normal behavior    Plan:  Check MRI, TTE, A1c, Lipids  -neuro checks as ordered  -concern for underlying aspiration PNA, continue Zosyn  -f/u CT scans, culture results, repeat lactic acid  -ID and neurology consulted    remainder as above 83 y/o M w/ PMH of HTN, HLD, history of prior TIA ([facility]), type 2 diabetes mellitus, hydrocephalus, BPH, and asthma BIBA for R sided weakness and cough meets sepsis criteria, admitted for TIA and sepsis workup.    HPI as above. [employer] [id number] used.     T(C): 37.2 (01-16-23 @ 10:01), Max: 38.6 (01-16-23 @ 07:40)  HR: 94 (01-16-23 @ 10:01) (94 - 121)  BP: 122/68 (01-16-23 @ 10:01) (114/63 - 138/73)  RR: 16 (01-16-23 @ 10:01) (16 - 16)  SpO2: 98% (01-16-23 @ 10:01) (95% - 100%)  Wt(kg): --    Physical Exam:   GENERAL: well-groomed, well-developed, NAD  HEENT: head NC/AT; EOM intact, PERRLA, conjunctiva & sclera clear; hearing grossly intact, moist mucous membranes  NECK: supple, no JVD  RESPIRATORY: decreased breath sounds b/l lung bases, no wheezing  CARDIOVASCULAR: S1&S2, RRR, no murmurs or gallops  ABDOMEN: soft, non-tender, non-distended, + Bowel sounds x4 quadrants, no guarding, rebound or rigidity  MUSCULOSKELETAL:  no clubbing, cyanosis or edema of all 4 extremities  LYMPH: no cervical lymphadenopathy  VASCULAR: Radial pulses 2+ bilaterally, no varicose veins   SKIN: warm and dry, color normal  NEUROLOGIC: AA&O X2 [had difficulty with date], CN2-12 intact w/ no focal deficits, no sensory loss, motor Strength 5/5 in UE & LE B/L    Plan:  Check MRI, TTE, A1c, Lipids  -neuro checks as ordered  -concern for underlying aspiration PNA, continue Zosyn  -CT scans reviewed.   -will give Lasix 40mg IV x1 as patient received 2L NS in the ER and likely has some pulmonary edema as a result.   -repeat CXR in AM  -ID and neurology consulted    remainder as above

## 2023-01-16 NOTE — H&P ADULT - PROBLEM SELECTOR PLAN 3
Chronic hydrocephalus  Patient with episode of bowel and bladder incontinence, first episode - possible manifestation of NPH?  CT head - Mild ventriculomegaly without significant change from the prior MRI of the brain dated 9/26/2022.   Continue home acetazolamide  Neurology consult (Dr. Martinez) consulted, f/u rec Chronic hydrocephalus  Patient with episode of bowel and bladder incontinence, first episode - possible manifestation of NPH?  CT head - Mild ventriculomegaly without significant change from the prior MRI of the brain dated 9/26/2022.   Continue home acetazolamide  Neurology consult consulted, f/u rec

## 2023-01-17 ENCOUNTER — TRANSCRIPTION ENCOUNTER (OUTPATIENT)
Age: 83
End: 2023-01-17

## 2023-01-17 LAB
A1C WITH ESTIMATED AVERAGE GLUCOSE RESULT: 7.8 % — HIGH (ref 4–5.6)
ALBUMIN SERPL ELPH-MCNC: 3.3 G/DL — SIGNIFICANT CHANGE UP (ref 3.3–5)
ALP SERPL-CCNC: 64 U/L — SIGNIFICANT CHANGE UP (ref 40–120)
ALT FLD-CCNC: 50 U/L — SIGNIFICANT CHANGE UP (ref 12–78)
ANION GAP SERPL CALC-SCNC: 5 MMOL/L — SIGNIFICANT CHANGE UP (ref 5–17)
AST SERPL-CCNC: 36 U/L — SIGNIFICANT CHANGE UP (ref 15–37)
BASOPHILS # BLD AUTO: 0.02 K/UL — SIGNIFICANT CHANGE UP (ref 0–0.2)
BASOPHILS NFR BLD AUTO: 0.1 % — SIGNIFICANT CHANGE UP (ref 0–2)
BILIRUB SERPL-MCNC: 0.6 MG/DL — SIGNIFICANT CHANGE UP (ref 0.2–1.2)
BUN SERPL-MCNC: 14 MG/DL — SIGNIFICANT CHANGE UP (ref 7–23)
CALCIUM SERPL-MCNC: 8.4 MG/DL — LOW (ref 8.5–10.1)
CHLORIDE SERPL-SCNC: 104 MMOL/L — SIGNIFICANT CHANGE UP (ref 96–108)
CHOLEST SERPL-MCNC: 121 MG/DL — SIGNIFICANT CHANGE UP
CO2 SERPL-SCNC: 24 MMOL/L — SIGNIFICANT CHANGE UP (ref 22–31)
CREAT SERPL-MCNC: 1 MG/DL — SIGNIFICANT CHANGE UP (ref 0.5–1.3)
CRP SERPL-MCNC: 59 MG/L — HIGH
EGFR: 74 ML/MIN/1.73M2 — SIGNIFICANT CHANGE UP
EOSINOPHIL # BLD AUTO: 0.05 K/UL — SIGNIFICANT CHANGE UP (ref 0–0.5)
EOSINOPHIL # BLD: 60 /UL — SIGNIFICANT CHANGE UP (ref 50–350)
EOSINOPHIL NFR BLD AUTO: 0.4 % — SIGNIFICANT CHANGE UP (ref 0–6)
ESTIMATED AVERAGE GLUCOSE: 177 MG/DL — HIGH (ref 68–114)
GLUCOSE SERPL-MCNC: 197 MG/DL — HIGH (ref 70–99)
HCT VFR BLD CALC: 39.1 % — SIGNIFICANT CHANGE UP (ref 39–50)
HDLC SERPL-MCNC: 46 MG/DL — SIGNIFICANT CHANGE UP
HGB BLD-MCNC: 13.2 G/DL — SIGNIFICANT CHANGE UP (ref 13–17)
IMM GRANULOCYTES NFR BLD AUTO: 0.7 % — SIGNIFICANT CHANGE UP (ref 0–0.9)
LIPID PNL WITH DIRECT LDL SERPL: 55 MG/DL — SIGNIFICANT CHANGE UP
LYMPHOCYTES # BLD AUTO: 1.47 K/UL — SIGNIFICANT CHANGE UP (ref 1–3.3)
LYMPHOCYTES # BLD AUTO: 10.9 % — LOW (ref 13–44)
MCHC RBC-ENTMCNC: 32.1 PG — SIGNIFICANT CHANGE UP (ref 27–34)
MCHC RBC-ENTMCNC: 33.8 GM/DL — SIGNIFICANT CHANGE UP (ref 32–36)
MCV RBC AUTO: 95.1 FL — SIGNIFICANT CHANGE UP (ref 80–100)
MONOCYTES # BLD AUTO: 0.76 K/UL — SIGNIFICANT CHANGE UP (ref 0–0.9)
MONOCYTES NFR BLD AUTO: 5.6 % — SIGNIFICANT CHANGE UP (ref 2–14)
NEUTROPHILS # BLD AUTO: 11.1 K/UL — HIGH (ref 1.8–7.4)
NEUTROPHILS NFR BLD AUTO: 82.3 % — HIGH (ref 43–77)
NON HDL CHOLESTEROL: 75 MG/DL — SIGNIFICANT CHANGE UP
NRBC # BLD: 0 /100 WBCS — SIGNIFICANT CHANGE UP (ref 0–0)
PLATELET # BLD AUTO: 259 K/UL — SIGNIFICANT CHANGE UP (ref 150–400)
POTASSIUM SERPL-MCNC: 3.7 MMOL/L — SIGNIFICANT CHANGE UP (ref 3.5–5.3)
POTASSIUM SERPL-SCNC: 3.7 MMOL/L — SIGNIFICANT CHANGE UP (ref 3.5–5.3)
PROT SERPL-MCNC: 6.4 G/DL — SIGNIFICANT CHANGE UP (ref 6–8.3)
RBC # BLD: 4.11 M/UL — LOW (ref 4.2–5.8)
RBC # FLD: 12.7 % — SIGNIFICANT CHANGE UP (ref 10.3–14.5)
SODIUM SERPL-SCNC: 133 MMOL/L — LOW (ref 135–145)
TRIGL SERPL-MCNC: 99 MG/DL — SIGNIFICANT CHANGE UP
WBC # BLD: 13.49 K/UL — HIGH (ref 3.8–10.5)
WBC # FLD AUTO: 13.49 K/UL — HIGH (ref 3.8–10.5)

## 2023-01-17 PROCEDURE — 93306 TTE W/DOPPLER COMPLETE: CPT | Mod: 26

## 2023-01-17 PROCEDURE — 70551 MRI BRAIN STEM W/O DYE: CPT | Mod: 26

## 2023-01-17 PROCEDURE — 71045 X-RAY EXAM CHEST 1 VIEW: CPT | Mod: 26

## 2023-01-17 PROCEDURE — 99233 SBSQ HOSP IP/OBS HIGH 50: CPT | Mod: GC

## 2023-01-17 RX ADMIN — ALBUTEROL 2.5 MILLIGRAM(S): 90 AEROSOL, METERED ORAL at 07:35

## 2023-01-17 RX ADMIN — PIPERACILLIN AND TAZOBACTAM 25 GRAM(S): 4; .5 INJECTION, POWDER, LYOPHILIZED, FOR SOLUTION INTRAVENOUS at 06:40

## 2023-01-17 RX ADMIN — Medication 3: at 13:10

## 2023-01-17 RX ADMIN — ALBUTEROL 2.5 MILLIGRAM(S): 90 AEROSOL, METERED ORAL at 20:25

## 2023-01-17 RX ADMIN — LATANOPROST 1 DROP(S): 0.05 SOLUTION/ DROPS OPHTHALMIC; TOPICAL at 21:45

## 2023-01-17 RX ADMIN — ENOXAPARIN SODIUM 40 MILLIGRAM(S): 100 INJECTION SUBCUTANEOUS at 15:01

## 2023-01-17 RX ADMIN — Medication 20 MILLIGRAM(S): at 06:40

## 2023-01-17 RX ADMIN — ACETAZOLAMIDE 125 MILLIGRAM(S): 250 TABLET ORAL at 14:57

## 2023-01-17 RX ADMIN — TAMSULOSIN HYDROCHLORIDE 0.4 MILLIGRAM(S): 0.4 CAPSULE ORAL at 21:46

## 2023-01-17 RX ADMIN — DULOXETINE HYDROCHLORIDE 30 MILLIGRAM(S): 30 CAPSULE, DELAYED RELEASE ORAL at 14:56

## 2023-01-17 RX ADMIN — PIPERACILLIN AND TAZOBACTAM 25 GRAM(S): 4; .5 INJECTION, POWDER, LYOPHILIZED, FOR SOLUTION INTRAVENOUS at 14:57

## 2023-01-17 RX ADMIN — Medication 2: at 09:42

## 2023-01-17 RX ADMIN — ALBUTEROL 2.5 MILLIGRAM(S): 90 AEROSOL, METERED ORAL at 15:24

## 2023-01-17 RX ADMIN — Medication 1: at 18:12

## 2023-01-17 RX ADMIN — PIPERACILLIN AND TAZOBACTAM 25 GRAM(S): 4; .5 INJECTION, POWDER, LYOPHILIZED, FOR SOLUTION INTRAVENOUS at 21:46

## 2023-01-17 RX ADMIN — LOSARTAN POTASSIUM 100 MILLIGRAM(S): 100 TABLET, FILM COATED ORAL at 06:40

## 2023-01-17 RX ADMIN — SIMVASTATIN 20 MILLIGRAM(S): 20 TABLET, FILM COATED ORAL at 21:45

## 2023-01-17 RX ADMIN — PANTOPRAZOLE SODIUM 40 MILLIGRAM(S): 20 TABLET, DELAYED RELEASE ORAL at 06:40

## 2023-01-17 RX ADMIN — MONTELUKAST 10 MILLIGRAM(S): 4 TABLET, CHEWABLE ORAL at 14:56

## 2023-01-17 RX ADMIN — Medication 81 MILLIGRAM(S): at 14:56

## 2023-01-17 RX ADMIN — FINASTERIDE 5 MILLIGRAM(S): 5 TABLET, FILM COATED ORAL at 14:56

## 2023-01-17 NOTE — SPEECH LANGUAGE PATHOLOGY EVALUATION - COMMENTS
81 y/o male adm 1/16 with R sided weakness. CT brain: no acute events. CTA chest: no PE. CT angio brain: no stenosis.  Order for speech/language eval received and chart reviewed. Pt also seen for swallow evaluation. Please see documentation for patient's history/background information and results. Faroese  used - ID number [id number]

## 2023-01-17 NOTE — PROGRESS NOTE ADULT - PROBLEM SELECTOR PLAN 1
Cough x 3 days w/ overnight episode of coughing, SOB, weakness, rigors - concern for aspiration PNA vs. less likely PE. Do not suspect meningitis  Spinal inj ~ 1 week ago for back pain, alert and oriented, negative kernig's - unlikely meningitis  Meets SIRS criteria on admission 2/2 possible aspiration PNA  CXR - No active parenchymal disease in the chest.  CT a/p w/o acute findings. CTA: no PE  Lactate - 3.4  Sepsis protocol  UA - negative  Continue zosyn X7days (stop 1/23)  Wean O2  Tylenol PRN for fever   BCx, UCx, MRSA PCR, legionella, strep - f/u  WBC downtrending  Trend fever, lactate  s/s --> regular diet, thin liquids  Infectious Disease Dr. Waggoner f/u recs Cough x 3 days w/ overnight episode of coughing, SOB, weakness, rigors - concern for aspiration PNA . Do not suspect meningitis  Spinal inj ~ 1 week ago for back pain, alert and oriented, negative kernig's - unlikely meningitis  Meets SIRS criteria on admission 2/2 possible aspiration PNA  CXR - No active parenchymal disease in the chest.  CT a/p w/o acute findings. CTA: no PE  Lactate - 3.4  Sepsis protocol  UA - negative  Continue zosyn X7days (stop 1/23)  Wean O2  Tylenol PRN for fever   BCx, UCx, MRSA PCR, legionella, strep - f/u  WBC downtrending  Trend fever, lactate  s/s --> regular diet, thin liquids  Infectious Disease Dr. Waggoner f/u recs

## 2023-01-17 NOTE — DISCHARGE NOTE PROVIDER - CARE PROVIDER_API CALL
[facility]
Dr. Spear- Neurology,   Phone: (   )    -  Fax: (   )    -  Follow Up Time:     PCP,   Phone: (   )    -  Fax: (   )    -  Follow Up Time:

## 2023-01-17 NOTE — DISCHARGE NOTE PROVIDER - NSDCCPCAREPLAN_GEN_ALL_CORE_FT
PRINCIPAL DISCHARGE DIAGNOSIS  Diagnosis: History of recurrent TIAs  Assessment and Plan of Treatment: Luckily you did not suffer a stroke at this time, however you may have had what we believe is a "TIA" or a mini-stroke. You will not have any residual problems from it, but we worry about TIAs because they may predict a higher likelihood of having a stroke (CVA) in the future. Be sure to practice healthy lifestyle measures, exercise on a regular basis, eat a healthy well balanced diet. Be sure to take your statin and aspirin on a daily basis. Return to th ER immediately if you have symptoms consistent with a stroke again such as slurred speech, one sided weakness, confusion, inability to talk or to understand speech from others.        SECONDARY DISCHARGE DIAGNOSES  Diagnosis: CVA (cerebral vascular accident)  Assessment and Plan of Treatment:      PRINCIPAL DISCHARGE DIAGNOSIS  Diagnosis: History of recurrent TIAs  Assessment and Plan of Treatment: Luckily you did not suffer a stroke at this time, however you may have had what we believe is a "TIA" or a mini-stroke. You will not have any residual problems from it, but we worry about TIAs because they may predict a higher likelihood of having a stroke (CVA) in the future. Be sure to practice healthy lifestyle measures, exercise on a regular basis, eat a healthy well balanced diet. Be sure to take your statin and aspirin on a daily basis. Return to  ER immediately if you have symptoms consistent with a stroke again such as slurred speech, one sided weakness, confusion, inability to talk or to understand speech from others.        SECONDARY DISCHARGE DIAGNOSES  Diagnosis: Benign prostatic hyperplasia  Assessment and Plan of Treatment: Benign Prostatic hyperplasia is a condition where your prostate is enlarged, which may cause urinary symptoms such as increased urgency, frequency, and incomplete emptying. It is important that you continue to take your flomax and finasteride for management of your BPH and follow-up with your primary care doctor or urologist for management of your condition.      Diagnosis: Diabetes  Assessment and Plan of Treatment: You were previously diagnosed with Diabetes. Your A1c on admission was 7.8. Please continue on your home medications, diabetic diet and exercise regimen at this time and follow up with your PCP/Endocrinologist for further surveillance and management of your Diabetes.

## 2023-01-17 NOTE — DISCHARGE NOTE PROVIDER - NSDCMRMEDTOKEN_GEN_ALL_CORE_FT
acetaZOLAMIDE 125 mg oral tablet: 1 tab(s) orally once a day  DULoxetine 30 mg oral delayed release capsule: 1 cap(s) orally once a day  esomeprazole 40 mg oral delayed release capsule: 1 cap(s) orally once a day  finasteride 5 mg oral tablet: 1 tab(s) orally once a day  icosapent 1 g oral capsule: 1 cap(s) orally 2 times a day  Januvia 100 mg oral tablet: 1 tab(s) orally once a day  latanoprost 0.005% ophthalmic solution: 1 drop(s) to each affected eye once a day (in the evening)  meloxicam 7.5 mg oral tablet: 1 tab(s) orally once a day  metFORMIN 850 mg oral tablet: 1 tab(s) orally 2 times a day  montelukast 10 mg oral tablet: 1 tab(s) orally once a day  olmesartan 40 mg oral tablet: 1 tab(s) orally once a day  simvastatin 20 mg oral tablet: 1 tab(s) orally once a day (at bedtime)  tamsulosin 0.4 mg oral capsule: 1 cap(s) orally once a day   acetaZOLAMIDE 125 mg oral tablet: 1 tab(s) orally once a day  Aspirin Low Dose 81 mg oral delayed release tablet: 1 tab(s) orally once a day  DULoxetine 30 mg oral delayed release capsule: 1 cap(s) orally once a day  esomeprazole 40 mg oral delayed release capsule: 1 cap(s) orally once a day  finasteride 5 mg oral tablet: 1 tab(s) orally once a day  icosapent 1 g oral capsule: 1 cap(s) orally 2 times a day  Januvia 100 mg oral tablet: 1 tab(s) orally once a day  latanoprost 0.005% ophthalmic solution: 1 drop(s) to each affected eye once a day (in the evening)  metFORMIN 850 mg oral tablet: 1 tab(s) orally 2 times a day  montelukast 10 mg oral tablet: 1 tab(s) orally once a day  olmesartan 40 mg oral tablet: 1 tab(s) orally once a day  simvastatin 20 mg oral tablet: 1 tab(s) orally once a day (at bedtime)  tamsulosin 0.4 mg oral capsule: 1 cap(s) orally once a day

## 2023-01-17 NOTE — OCCUPATIONAL THERAPY INITIAL EVALUATION ADULT - RANGE OF MOTION EXAMINATION, UPPER EXTREMITY
WFL [facility] opposition x5 digits BUE. Sensation grossly intact to light touch; no c/o numbness/tingling./bilateral UE Active ROM was WFL  (within functional limits)

## 2023-01-17 NOTE — PHYSICAL THERAPY INITIAL EVALUATION ADULT - PERTINENT HX OF CURRENT PROBLEM, REHAB EVAL
81 y/o male adm 1/16 with R sided weakness. CT brain: no acute events. CTA chest: no PE. CT angio brain: no stenosis.

## 2023-01-17 NOTE — OCCUPATIONAL THERAPY INITIAL EVALUATION ADULT - REHAB POTENTIAL, OT EVAL
Pt completes ADLs independently and was observed to be independent without AD for functional mobility and independent with bed mobility. Pt does not require skilled OT at this time and will be d/c; please re-consult if clinically indicated./none

## 2023-01-17 NOTE — OCCUPATIONAL THERAPY INITIAL EVALUATION ADULT - PERTINENT HX OF CURRENT PROBLEM, REHAB EVAL
81 y/o M w/ PMH of HTN, HLD, history of prior TIA (09/2022 adm to [facility]), type 2 diabetes mellitus, hydrocephalus, BPH, and asthma BIBA for R sided weakness and cough meets sepsis criteria, admitted for TIA and sepsis workup. MRI head: No acute infarct.

## 2023-01-17 NOTE — OCCUPATIONAL THERAPY INITIAL EVALUATION ADULT - ADDITIONAL COMMENTS
Pt reports he lives with his son in a private home and was independent with ADLs PTA. Pt used SAC for functional mobility. His bedroom is upstairs. Pt reports his son is able to assist PRN.

## 2023-01-17 NOTE — SWALLOW BEDSIDE ASSESSMENT ADULT - SWALLOW EVAL: DIAGNOSIS
Patient demonstrates functional oropharyngeal swallow for all consistencies presented marked by adequate oral management, appearance of timely swallow initiation and adequate hyolaryngeal movement per palpation with no clinical signs of aspiration/penetration

## 2023-01-17 NOTE — DISCHARGE NOTE PROVIDER - HOSPITAL COURSE
HPI:  Patient is an 81 y/o M w/ PMH of HTN, HLD, history of prior TIA (09/2022 adm to [facility]), Type 2 Diabetes Mellitus, hydrocephalus, BPH, and asthma BIBA for R sided weakness and cough. Patient Malay speaking, son translated. Per the son patient with non-productive cough x 3 days. Went to sleep at 9PM on 1/15, last known well. At 3 AM the patient was awake and coughing. The son checked on the patient who was noted to have difficulty arising from bed with some R sided weakness, experiencing a coughing fit with SOB, rigors and appeared to have had urinated and defecated on himself. Son endorsed patient had an inability to ambulate at that point. Of note this was the patient's first episode of incontinence. Patient w/ history of spinal injection for back pain ~ 1 week ago. Upon arrival to the ED the patient had an episode of emesis, O2 sats about 91-92%; improvement noted on NC. Concern for possible aspiration though per charting patient was sitting upright when he vomited, NBNB emesis. No sick contacts per son though patient does go to  M,W,F.    In the ED:  VS: T 97.7, , 138/73, RR 16, 95% on RA  S/p: Tylenol x 1, vanc + zosyn, 1 L NS bolus x 2  EKG: Sinus tach, rate 123, LAD, motion artifact  Labs: WBC 18; Lacte 2.8;     Imaging:  CXR: CXR clear  CT Head w/o: No evidence of acute intracranial hemorrhage, midline shift or CT evidence of acute territorial infarct. Mild ventriculomegaly without significant change from the prior MRI of the brain dated 9/26/2022.   CT PERFUSION: No core infarct or penumbra of ischemic tissue is identified by CT perfusion.  CT ANGIOGRAPHY NECK: Patent cervical vasculature. No hemodynamically significant carotid stenosis by NASCET criteria or flow-limiting vertebral artery stenosis.  No evidence of dissection.  CT ANGIOGRAPHY BRAIN: No vessel occlusion, flow-limiting stenosis or aneurysm. (16 Jan 2023 08:36)      ---  HOSPITAL COURSE:   Patient admitted to telemetry for SIRS possibly 2/2 aspiration pneumonia and TIA workup. CXR showed no active parenchymal disease in the chest, CTA was negative for PE, and CT a/p with no acute findings. UA was negative. Blood cultures revealed ***. Lactate was 3.4, and downtrended to ***. Infectious disease was consulted, and recommended empiric Zosyn for 7 day course. Neurology consulted for TIA workup. MRI revealed no acute infarct, severe generalized cerebral volume loss, and enlargement of ventricles likely proportional to degree of volume loss, however study could not entirely exclude NPH. TTE demonstrated ***.    **Updated 1/17    ---  CONSULTANTS:     ---  TIME SPENT:  I, the attending physician, was physically present for the key portions of the evaluation and management (E/M) service provided. The total amount of time spent reviewing the hospital notes, laboratory values, imaging findings, assessing/counseling the patient, discussing with consultant physicians, social work, nursing staff was -- minutes    ---  Primary care provider was made aware of plan for discharge:      [  ] NO     [  ] YES   HPI:  Patient is an 81 y/o M w/ PMH of HTN, HLD, history of prior TIA (09/2022 adm to [facility]), Type 2 Diabetes Mellitus, hydrocephalus, BPH, and asthma BIBA for R sided weakness and cough. Patient Nepali speaking, son translated. Per the son patient with non-productive cough x 3 days. Went to sleep at 9PM on 1/15, last known well. At 3 AM the patient was awake and coughing. The son checked on the patient who was noted to have difficulty arising from bed with some R sided weakness, experiencing a coughing fit with SOB, rigors and appeared to have had urinated and defecated on himself. Son endorsed patient had an inability to ambulate at that point. Of note this was the patient's first episode of incontinence. Patient w/ history of spinal injection for back pain ~ 1 week ago. Upon arrival to the ED the patient had an episode of emesis, O2 sats about 91-92%; improvement noted on NC. Concern for possible aspiration though per charting patient was sitting upright when he vomited, NBNB emesis. No sick contacts per son though patient does go to  M,W,F.    In the ED:  VS: T 97.7, , 138/73, RR 16, 95% on RA  S/p: Tylenol x 1, vanc + zosyn, 1 L NS bolus x 2  EKG: Sinus tach, rate 123, LAD, motion artifact  Labs: WBC 18; Lacte 2.8;     Imaging:  CXR: CXR clear  CT Head w/o: No evidence of acute intracranial hemorrhage, midline shift or CT evidence of acute territorial infarct. Mild ventriculomegaly without significant change from the prior MRI of the brain dated 9/26/2022.   CT PERFUSION: No core infarct or penumbra of ischemic tissue is identified by CT perfusion.  CT ANGIOGRAPHY NECK: Patent cervical vasculature. No hemodynamically significant carotid stenosis by NASCET criteria or flow-limiting vertebral artery stenosis.  No evidence of dissection.  CT ANGIOGRAPHY BRAIN: No vessel occlusion, flow-limiting stenosis or aneurysm. (16 Jan 2023 08:36)      ---  HOSPITAL COURSE:   Patient admitted to telemetry for SIRS possibly 2/2 aspiration pneumonia and TIA workup. CXR showed no active parenchymal disease in the chest, CTA was negative for PE, and CT a/p with no acute findings. UA was negative. Blood cultures revealed ***. Lactate was 3.4, and downtrended to ***. Infectious disease was consulted, and recommended empiric Zosyn for 7 day course. Neurology consulted for TIA workup. MRI revealed no acute infarct, severe generalized cerebral volume loss, and enlargement of ventricles likely proportional to degree of volume loss, however study could not entirely exclude NPH. TTE demonstrated ***.    **Updated 1/17    ---  CONSULTANTS:   Neuro: Kishore  ID: Edilson  Pulm: Quang  OT/PT  Speech/Swallow      ---  TIME SPENT:  I, the attending physician, was physically present for the key portions of the evaluation and management (E/M) service provided. The total amount of time spent reviewing the hospital notes, laboratory values, imaging findings, assessing/counseling the patient, discussing with consultant physicians, social work, nursing staff was -- minutes    ---  Primary care provider was made aware of plan for discharge:      [  ] NO     [  ] YES   HPI:  Patient is an 83 y/o M w/ PMH of HTN, HLD, history of prior TIA (09/2022 adm to [facility]), Type 2 Diabetes Mellitus, hydrocephalus, BPH, and asthma BIBA for R sided weakness and cough. Patient Hebrew speaking, son translated. Per the son patient with non-productive cough x 3 days. Went to sleep at 9PM on 1/15, last known well. At 3 AM the patient was awake and coughing. The son checked on the patient who was noted to have difficulty arising from bed with some R sided weakness, experiencing a coughing fit with SOB, rigors and appeared to have had urinated and defecated on himself. Son endorsed patient had an inability to ambulate at that point. Of note this was the patient's first episode of incontinence. Patient w/ history of spinal injection for back pain ~ 1 week ago. Upon arrival to the ED the patient had an episode of emesis, O2 sats about 91-92%; improvement noted on NC. Concern for possible aspiration though per charting patient was sitting upright when he vomited, NBNB emesis. No sick contacts per son though patient does go to  M,W,F.    In the ED:  VS: T 97.7, , 138/73, RR 16, 95% on RA  S/p: Tylenol x 1, vanc + zosyn, 1 L NS bolus x 2  EKG: Sinus tach, rate 123, LAD, motion artifact  Labs: WBC 18; Lacte 2.8;     Imaging:  CXR: CXR clear  CT Head w/o: No evidence of acute intracranial hemorrhage, midline shift or CT evidence of acute territorial infarct. Mild ventriculomegaly without significant change from the prior MRI of the brain dated 9/26/2022.   CT PERFUSION: No core infarct or penumbra of ischemic tissue is identified by CT perfusion.  CT ANGIOGRAPHY NECK: Patent cervical vasculature. No hemodynamically significant carotid stenosis by NASCET criteria or flow-limiting vertebral artery stenosis.  No evidence of dissection.  CT ANGIOGRAPHY BRAIN: No vessel occlusion, flow-limiting stenosis or aneurysm. (16 Jan 2023 08:36)      ---  HOSPITAL COURSE:   Patient admitted to telemetry for SIRS possibly 2/2 aspiration pneumonia and TIA workup. CXR showed no active parenchymal disease in the chest, CTA was negative for PE, and CT a/p with no acute findings. UA was negative. Blood cultures revealed no growth. Infectious disease was consulted, and recommended empiric Zosyn for 7 day course. No infectious source was identified therefore abx were DC'd. Neurology consulted for TIA workup. MRI revealed no acute infarct, severe generalized cerebral volume loss, and enlargement of ventricles likely proportional to degree of volume loss, however study could not entirely exclude NPH. TTE performed, preliminarily showed diastolic dysfunction EF 55%.    ---  CONSULTANTS:   Neuro: Kishore  ID: Edilson  Pulm: Quang  OT/PT  Speech/Swallow      ---  TIME SPENT:  I, the attending physician, was physically present for the key portions of the evaluation and management (E/M) service provided. The total amount of time spent reviewing the hospital notes, laboratory values, imaging findings, assessing/counseling the patient, discussing with consultant physicians, social work, nursing staff was -- minutes    ---  Primary care provider was made aware of plan for discharge:      [ x ] NO     [  ] YES   HPI:  Patient is an 83 y/o M w/ PMH of HTN, HLD, history of prior TIA (09/2022 adm to [facility]), Type 2 Diabetes Mellitus, hydrocephalus, BPH, and asthma BIBA for R sided weakness and cough. Patient Vietnamese speaking, son translated. Per the son patient with non-productive cough x 3 days. Went to sleep at 9PM on 1/15, last known well. At 3 AM the patient was awake and coughing. The son checked on the patient who was noted to have difficulty arising from bed with some R sided weakness, experiencing a coughing fit with SOB, rigors and appeared to have had urinated and defecated on himself. Son endorsed patient had an inability to ambulate at that point. Of note this was the patient's first episode of incontinence. Patient w/ history of spinal injection for back pain ~ 1 week ago. Upon arrival to the ED the patient had an episode of emesis, O2 sats about 91-92%; improvement noted on NC. Concern for possible aspiration though per charting patient was sitting upright when he vomited, NBNB emesis. No sick contacts per son though patient does go to  M,W,F.    In the ED:  VS: T 97.7, , 138/73, RR 16, 95% on RA  S/p: Tylenol x 1, vanc + zosyn, 1 L NS bolus x 2  EKG: Sinus tach, rate 123, LAD, motion artifact  Labs: WBC 18; Lacte 2.8;     Imaging:  CXR: CXR clear  CT Head w/o: No evidence of acute intracranial hemorrhage, midline shift or CT evidence of acute territorial infarct. Mild ventriculomegaly without significant change from the prior MRI of the brain dated 9/26/2022.   CT PERFUSION: No core infarct or penumbra of ischemic tissue is identified by CT perfusion.  CT ANGIOGRAPHY NECK: Patent cervical vasculature. No hemodynamically significant carotid stenosis by NASCET criteria or flow-limiting vertebral artery stenosis.  No evidence of dissection.  CT ANGIOGRAPHY BRAIN: No vessel occlusion, flow-limiting stenosis or aneurysm. (16 Jan 2023 08:36)      ---  HOSPITAL COURSE:   Patient admitted to telemetry for SIRS possibly 2/2 aspiration pneumonia and TIA workup. CXR showed no active parenchymal disease in the chest, CTA was negative for PE, and CT a/p with no acute findings. UA was negative. Blood cultures revealed no growth. Infectious disease was consulted, and recommended empiric Zosyn for 7 day course. No infectious source was identified therefore abx were DC'd. Pulmonary consulted. Pt rec'd 3 days prednisone. Neurology consulted for TIA workup. MRI revealed no acute infarct, severe generalized cerebral volume loss, and enlargement of ventricles likely proportional to degree of volume loss, however study could not entirely exclude NPH. TTE performed, preliminarily showed diastolic dysfunction EF 55%. Passed speech and swallow exam w/no restrictions.       Patient showed improvement throughout hospitalization. Patient was seen and examined on day of discharge. [employer]  # [id number] was utilized for Tajik translation. Patient was medically optimized for discharge home with close outpatient follow up.    T(C): 37 (01-18-23 @ 11:54), Max: 37 (01-18-23 @ 11:54)  HR: 80 (01-18-23 @ 11:54) (62 - 80)  BP: 157/77 (01-18-23 @ 11:54) (134/74 - 158/80)  RR: 18 (01-18-23 @ 11:54) (16 - 19)  SpO2: 98% (01-18-23 @ 11:54) (94% - 98%)      Physical Exam:   GENERAL: NAD  HEENT:  anicteric, moist mucous membranes  CHEST/LUNG:  CTA b/l, no rales, wheezes, or rhonchi  HEART:  tele monitor on, RRR, S1, S2  ABDOMEN:  BS+, soft, nontender, nondistended  EXTREMITIES: no edema, cyanosis, or calf tenderness  NERVOUS SYSTEM:  answers questions and follows commands appropriately, moving all 4 extremities spontaneously, motor Strength 5/5 in UE & LE B/L  PSYCH: normal mood, affect and behavior          ---  CONSULTANTS:   Neuro: Kishore  ID: Edilson  Pulm: Quang  OT/PT  Speech/Swallow      ---  TIME SPENT:  I, the attending physician, was physically present for the key portions of the evaluation and management (E/M) service provided. The total amount of time spent reviewing the hospital notes, laboratory values, imaging findings, assessing/counseling the patient, discussing with consultant physicians, social work, nursing staff was -- minutes    ---  Primary care provider was made aware of plan for discharge:      [ x ] NO     [  ] YES   HPI:  Patient is an 81 y/o M w/ PMH of HTN, HLD, history of prior TIA (09/2022 adm to [facility]), Type 2 Diabetes Mellitus, hydrocephalus, BPH, and asthma BIBA for R sided weakness and cough. Patient Lithuanian speaking, son translated. Per the son patient with non-productive cough x 3 days. Went to sleep at 9PM on 1/15, last known well. At 3 AM the patient was awake and coughing. The son checked on the patient who was noted to have difficulty arising from bed with some R sided weakness, experiencing a coughing fit with SOB, rigors and appeared to have had urinated and defecated on himself. Son endorsed patient had an inability to ambulate at that point. Of note this was the patient's first episode of incontinence. Patient w/ history of spinal injection for back pain ~ 1 week ago. Upon arrival to the ED the patient had an episode of emesis, O2 sats about 91-92%; improvement noted on NC. Concern for possible aspiration though per charting patient was sitting upright when he vomited, NBNB emesis. No sick contacts per son though patient does go to  M,W,F.    In the ED:  VS: T 97.7, , 138/73, RR 16, 95% on RA  S/p: Tylenol x 1, vanc + zosyn, 1 L NS bolus x 2  EKG: Sinus tach, rate 123, LAD, motion artifact  Labs: WBC 18; Lacte 2.8;     Imaging:  CXR: CXR clear  CT Head w/o: No evidence of acute intracranial hemorrhage, midline shift or CT evidence of acute territorial infarct. Mild ventriculomegaly without significant change from the prior MRI of the brain dated 9/26/2022.   CT PERFUSION: No core infarct or penumbra of ischemic tissue is identified by CT perfusion.  CT ANGIOGRAPHY NECK: Patent cervical vasculature. No hemodynamically significant carotid stenosis by NASCET criteria or flow-limiting vertebral artery stenosis.  No evidence of dissection.  CT ANGIOGRAPHY BRAIN: No vessel occlusion, flow-limiting stenosis or aneurysm. (16 Jan 2023 08:36)      ---  HOSPITAL COURSE:   Patient admitted to telemetry for SIRS possibly 2/2 aspiration pneumonia and TIA workup. CXR showed no active parenchymal disease in the chest, CTA was negative for PE, and CT a/p with no acute findings. UA was negative. Blood cultures revealed no growth. Infectious disease was consulted, and recommended empiric Zosyn for 7 day course. No infectious source was identified therefore abx were DC'd. Pulmonary consulted. Pt rec'd 3 days prednisone. Neurology consulted for TIA workup. MRI revealed no acute infarct, severe generalized cerebral volume loss, and enlargement of ventricles likely proportional to degree of volume loss, however study could not entirely exclude NPH. TTE performed, preliminarily showed diastolic dysfunction EF 55%. A1c found to be 7.8. Passed speech and swallow exam w/no restrictions.       Patient showed improvement throughout hospitalization. Patient was seen and examined on day of discharge. [employer]  # [id number] was utilized for Bengali translation. Patient was medically optimized for discharge home with close outpatient follow up.    T(C): 37 (01-18-23 @ 11:54), Max: 37 (01-18-23 @ 11:54)  HR: 80 (01-18-23 @ 11:54) (62 - 80)  BP: 157/77 (01-18-23 @ 11:54) (134/74 - 158/80)  RR: 18 (01-18-23 @ 11:54) (16 - 19)  SpO2: 98% (01-18-23 @ 11:54) (94% - 98%)      Physical Exam:   GENERAL: NAD  HEENT:  anicteric, moist mucous membranes  CHEST/LUNG:  CTA b/l, no rales, wheezes, or rhonchi  HEART:  tele monitor on, RRR, S1, S2  ABDOMEN:  BS+, soft, nontender, nondistended  EXTREMITIES: no edema, cyanosis, or calf tenderness  NERVOUS SYSTEM:  answers questions and follows commands appropriately, moving all 4 extremities spontaneously, motor Strength 5/5 in UE & LE B/L  PSYCH: normal mood, affect and behavior          ---  CONSULTANTS:   Neuro: Kishore  ID: Edilson  Pulm: Quang  OT/PT  Speech/Swallow      ---  TIME SPENT:  I, the attending physician, was physically present for the key portions of the evaluation and management (E/M) service provided. The total amount of time spent reviewing the hospital notes, laboratory values, imaging findings, assessing/counseling the patient, discussing with consultant physicians, social work, nursing staff was -- minutes    ---  Primary care provider was made aware of plan for discharge:      [ x ] NO     [  ] YES   HPI:  Patient is an 81 y/o M w/ PMH of HTN, HLD, history of prior TIA (09/2022 adm to [facility]), Type 2 Diabetes Mellitus, hydrocephalus, BPH, and asthma BIBA for R sided weakness and cough. Patient Lao speaking, son translated. Per the son patient with non-productive cough x 3 days. Went to sleep at 9PM on 1/15, last known well. At 3 AM the patient was awake and coughing. The son checked on the patient who was noted to have difficulty arising from bed with some R sided weakness, experiencing a coughing fit with SOB, rigors and appeared to have had urinated and defecated on himself. Son endorsed patient had an inability to ambulate at that point. Of note this was the patient's first episode of incontinence. Patient w/ history of spinal injection for back pain ~ 1 week ago. Upon arrival to the ED the patient had an episode of emesis, O2 sats about 91-92%; improvement noted on NC. Concern for possible aspiration though per charting patient was sitting upright when he vomited, NBNB emesis. No sick contacts per son though patient does go to  M,W,F.    In the ED:  VS: T 97.7, , 138/73, RR 16, 95% on RA  S/p: Tylenol x 1, vanc + zosyn, 1 L NS bolus x 2  EKG: Sinus tach, rate 123, LAD, motion artifact  Labs: WBC 18; Lacte 2.8;     Imaging:  CXR: CXR clear  CT Head w/o: No evidence of acute intracranial hemorrhage, midline shift or CT evidence of acute territorial infarct. Mild ventriculomegaly without significant change from the prior MRI of the brain dated 9/26/2022.   CT PERFUSION: No core infarct or penumbra of ischemic tissue is identified by CT perfusion.  CT ANGIOGRAPHY NECK: Patent cervical vasculature. No hemodynamically significant carotid stenosis by NASCET criteria or flow-limiting vertebral artery stenosis.  No evidence of dissection.  CT ANGIOGRAPHY BRAIN: No vessel occlusion, flow-limiting stenosis or aneurysm. (16 Jan 2023 08:36)      ---  HOSPITAL COURSE:   Patient admitted to telemetry for SIRS possibly 2/2 aspiration pneumonia and TIA workup. CXR showed no active parenchymal disease in the chest, CTA was negative for PE, and CT a/p with no acute findings. UA was negative. Blood cultures revealed no growth. Infectious disease was consulted, and recommended empiric Zosyn for 7 day course. No infectious source was identified therefore abx were DC'd. Pulmonary consulted. Pt rec'd 3 days prednisone. Neurology consulted for TIA workup. MRI revealed no acute infarct, severe generalized cerebral volume loss, and enlargement of ventricles likely proportional to degree of volume loss, however study could not entirely exclude NPH. TTE performed, preliminarily showed diastolic dysfunction EF 55%. A1c found to be 7.8. Passed speech and swallow exam w/no restrictions.       Patient showed improvement throughout hospitalization. Patient was seen and examined on day of discharge. [employer]  # [id number] was utilized for Bulgarian translation. Patient was medically optimized for discharge home with close outpatient follow up.    T(C): 37 (01-18-23 @ 11:54), Max: 37 (01-18-23 @ 11:54)  HR: 80 (01-18-23 @ 11:54) (62 - 80)  BP: 157/77 (01-18-23 @ 11:54) (134/74 - 158/80)  RR: 18 (01-18-23 @ 11:54) (16 - 19)  SpO2: 98% (01-18-23 @ 11:54) (94% - 98%)      Physical Exam:   GENERAL: NAD  HEENT:  anicteric, moist mucous membranes  CHEST/LUNG:  CTA b/l, no rales, wheezes, or rhonchi  HEART:  tele monitor on, RRR, S1, S2  ABDOMEN:  BS+, soft, nontender, nondistended  EXTREMITIES: no edema, cyanosis, or calf tenderness  NERVOUS SYSTEM:  answers questions and follows commands appropriately, moving all 4 extremities spontaneously, motor Strength 5/5 in UE & LE B/L, AAOx2  PSYCH: normal mood, affect and behavior          ---  CONSULTANTS:   Neuro: Kishore  ID: Edilson  Pulm: Quang  OT/PT  Speech/Swallow      ---  TIME SPENT:  I, the attending physician, was physically present for the key portions of the evaluation and management (E/M) service provided. The total amount of time spent reviewing the hospital notes, laboratory values, imaging findings, assessing/counseling the patient, discussing with consultant physicians, social work, nursing staff was 37 minutes    ---  Primary care provider was made aware of plan for discharge:      [ x ] NO     [  ] YES

## 2023-01-17 NOTE — PROGRESS NOTE ADULT - PROBLEM SELECTOR PLAN 3
Chronic hydrocephalus  Patient with episode of bowel and bladder incontinence, first episode - possible manifestation of NPH?  CT head - Mild ventriculomegaly without significant change from the prior MRI of the brain dated 9/26/2022.   Continue home acetazolamide  Neurology Kishore following

## 2023-01-17 NOTE — SWALLOW BEDSIDE ASSESSMENT ADULT - COMMENTS
81 y/o M w/ PMH of HTN, HLD, history of prior TIA (09/2022 adm to [facility]), Type 2 Diabetes Mellitus, hydrocephalus, BPH, and asthma BIBA for R sided weakness and cough.  Lungs, Pleura, and Airways: There is no pulmonary embolus. No   consolidations, edema, effusion, or pneumothorax.    Order received and chart reviewed. Pt in bed, able to sit himself upright for PO intake. Denies difficulty with speech/language and swallowing. Pt in NAD prior to or following assessment. Pt is AAOX3, follows commands and is verbal. Speech/language eval also completed, please see report for details. Turkmen  service used -  ID number [id number] 83 y/o M w/ PMH of HTN, HLD, history of prior TIA (09/2022 adm to [facility]), Type 2 Diabetes Mellitus, hydrocephalus, BPH, and asthma BIBA for R sided weakness and cough.  Per charting, possible aspiration event in ED following vomiting  Lungs, Pleura, and Airways: There is no pulmonary embolus. No consolidations, edema, effusion, or pneumothorax.    Order received and chart reviewed. Pt in bed, able to sit himself upright for PO intake. Denies difficulty with speech/language and swallowing. Pt in NAD prior to or following assessment. Pt is AAOX3, follows commands and is verbal. Speech/language eval also completed, please see report for details. Irish  service used -  ID number [id number]

## 2023-01-17 NOTE — PROGRESS NOTE ADULT - PROBLEM SELECTOR PLAN 2
Acute onset R sided weakness, now resolved, d/w tele neuro in ED - sx mild and out of window for intervention  Admitted 9/2022 for TIA to [facility]  CT head negative for acute bleed; mild ventriculomegaly without significant change from MRI  brain dated 9/26/2022   CT perfusion studies - no carotid stenosis  MR head non con - f/u  TTE - f/u  HbA1c f/up  lipid panel reviewed, WNL  Fall and aspiration precautions  no residual weakness found on exam  s/s --> full diet, thin liquids  Continue ASA and statin  Telemetry cardiac monitoring  DC Neurochecks q 4 hrs  Neurology Kishore following

## 2023-01-17 NOTE — PROGRESS NOTE ADULT - PROBLEM SELECTOR PLAN 9
Lovenox 40mg qD for DVT prophylaxis        #GI prophylaxis  Pantoprazole 40 QD    PT --> no skilled PT needs

## 2023-01-17 NOTE — PHYSICAL THERAPY INITIAL EVALUATION ADULT - ADDITIONAL COMMENTS
Pt lives w/ his son and son's family in a house, + steps to bedroom. Pt ambulates independently with SC and was independent with ADLs. Information obtained from pt's son at bedside- pt is Yi speaking

## 2023-01-17 NOTE — DISCHARGE NOTE PROVIDER - DID THE PATIENT PRESENT WITH OR WAS TREATED FOR MALNUTRITION DURING THIS ADMISSION
[facility] Hospital Medicine  Discharge Summary      Patient Name: Donald Warren Abadie  MRN: [id number]  Admission Date: 3/15/2019  Hospital Length of Stay: 14 days  Discharge Date and Time:  03/29/2019 11:16 AM  Attending Physician: DEAN Vigil MD   Discharging Provider: WASHINGTON Vigil MD  Primary Care Provider: Bacilio Larry MD    Hospital Medicine Team: [facility] HOSP MED S WASHINGTON Vigil MD    HPI:  Mr Abadie is a 61 y/o male with PMH of left sided RCC s/p partial nephrectomy (2014), paroxysmal AFib, EtOH abuse (PSYCH offered rehabilitation but declined), HTN, HLD, and fatty liver who presented to [facility] ED on 3/15 with fatigue/malaise. There was concern that he had taken an accidental overdose of BB and flecainide because he felt that he was having tachycardia and reported taking additional doses of his prescribed metoprolol several times in the preceding days. He had also being drinking and his daughter reports hallucinations before ED admission. In ED, the EKG showed severe bradycardia. Reportedly, the patient displayed symptoms of EtOH withdrawal including anxiety, diaphoresis, agitation and hallucinations. There was concerned about a seizure prior to the patient going into into cardiac arrest.  A TVP was placed for HR in the 40s, post-ROSC. Etiology of arrest unclear but felt to be likely severe bradycardia given BB overdose.The patient remained intubated until 3/21 and was on the Cardiology service until 3/22 when MICU was consulted to assume care. During this week of admission, he was treated for alcohol withdrawals and on propofol and Versed during that time and also given a librium taper. He intermittently experience Afib/flutter and has been on a heparin infusion. Additionally, he had intermittent fevers, unclear source but was given intermittent Cefepime, Vancomycin until this was also discontinued with cultures negative. Since extubation, the patient has experienced altered mental  status of unclear etiology. He never had any focal deficits, and is oriented to place and person. The MICU assumed care on 3/22.     Hospital/ICU Course:  3/23- Continue precedex, wean as tolerated. MRI shows no acute change. Bedside swallow, dc amaya, central line and abx.   3/24: Precedex stopped, Seroquel started. Hypotensive and Tachycardic, NS bolus with improvement  Metoprolol, diltiazem restarted for Afib, intermittent RVR and he has tolerated well. Mental status slowly improving.     Procedure(s) (LRB):  CARDIOVERSION OR DEFIBRILLATION (N/A)  ECHOCARDIOGRAM,TRANSESOPHAGEAL (N/A)      Hospital Course:   Acute metabolic encephalopathy  -Likely multifactorial from ETOH abuse and withdrawal, delirium, arrest, intubation and sedation  -EEG on admission negative  -Delirium precautions  -CT on admission and on 3/22 shows no acute process  -MRI shows no acute abnormality and small remote left occipital lobe infarct  -thiamine tablet 100 mg, 100 mg, Oral, Daily  -QUEtiapine tablet 25 mg, 25 mg, Oral, QHS              -Daughter feels this is over sedating, so stopping  -Started Haldol 2mg po QHS on 3/26/19 with good results  -Required 5mg of IV Haldol night  Of 3/25/19 due to severe agitation  -Awaiting final Psychiatry consult recommendations from today as well     Bradycardia  -Likely from Diltiazem and Lopressor combo              -Reduced Lopressor 25mg--.>12.5mg PO BID on 3/27              -Discontinue the Diltiazem on 3/27d  -Mostly in 60's, but does drop to 40's at times  -Checked EKG: Just sinus bradycardia  -Home Diltiazem and Flecainide both stopped   -Only treating with BB     ETOH abuse and addiction  Sleep disturbance  -Psychiatry consult noted and appreciated  -Started Remeron 7.5mg po Q HS on 3/27  Per psych:  Alcohol use disorder, severe, in controlled environment  · Patient has been treated for acute alcohol withdrawal, not presently symptomatic  · Recommend continued treatment with thiamine 100mg po  qdaily, would offer IV if patient has IV access; has received 5 days IV tx during stay  · Patient motivated for rehab, AA, no prior treatment in these settings; recommend dispo to residential rehab if possible   · Resource sheet for rehab and ABU provided to patient and family   Need for Continued Hospitalization:   No need for inpatient psychiatric hospitalization. Continue medical care as per the primary team.     Alcohol withdrawal, resolved  -Originally on Precedex infusion, then completed benzo taper     Hypokalemia  -potassium bicarbonate disintegrating tablet 50 mEq, 50 mEq, Oral, Once  -Keep K>4, Mg>2     Hypomagnesemia  -magnesium sulfate 3g in water 50mL IVPB (premix), Intravenous, Once     Cardiopulmonary arrest  -Unsure of etiology for sure  -Thinking was overdose of home beta blocker induced severe bradycardia, then arrest              -Glucagon was given in ED       Paroxysmal atrial fibrillation  -Holding home flecainide  -Started running bradycardic on 3/27/19 to 40's:  -diltiaZEM 24 hr capsule 120 mg, 120 mg, Oral, Daily was discontinued after am dose on 3/27/19  -metoprolol tartrate (LOPRESSOR) tablet 25 mg Oral, BID reduced to 12.5mg on 3/27/19  -Anticoagulation as per below     Long term use of anticoagulation  -apixaban tablet 5 mg, 5 mg, Oral, BID      Diabetes mellitus without complication  -insulin aspart U-100 pen 0-5 Units, 0-5 Units, Subcutaneous, QID (AC + HS) PRN     Elevated LFTs  -lactulose 20 gram/30 mL solution Soln 20 g, 20 g, Oral, TID  -INR normal with low MELD: no high suspicion for severe cirrhosis     Macrocytic anemia  -Likely due to ETOH abuse  -Add MVI, Folic Acid 1mg po qday  -B12 1000mcg po QDAY      Consults:   Consults (From admission, onward)        Status Ordering Provider     Inpatient consult to Cardiology  Once     Provider:  (Not yet assigned)    Completed FABIAN CORTEZ     Inpatient consult to Hepatology  Once     Provider:  (Not yet assigned)    Completed  SUMI PEPE     Inpatient consult to Neurology  Once     Provider:  (Not yet assigned)    Completed OMI TORRES     Inpatient consult to Psychiatry  Once     Provider:  (Not yet assigned)    Completed DEAN WILHELM     Inpatient consult to Registered Dietitian/Nutritionist  Once     Provider:  (Not yet assigned)    Completed SUMI PEPE          Final Active Diagnoses:    Diagnosis Date Noted POA    PRINCIPAL PROBLEM:  Acute metabolic encephalopathy [G93.41] 03/22/2019 Yes    Substance induced mood disorder [F19.94] 03/27/2019 Yes    Unintentional poisoning by beta-adrenoreceptor antagonist [T44.7X1A] 03/25/2019 Yes    Alcohol use disorder, severe, in controlled environment [F10.20]  Yes    Cardiac arrest [I46.9] 03/15/2019 Yes    Diabetes mellitus without complication [E11.9] 11/16/2016 Yes    Paroxysmal atrial fibrillation [I48.0] 08/22/2016 Yes    Alcohol withdrawal [F10.239] 05/22/2014 Yes    Elevated LFTs [R94.5] 05/22/2014 Yes      Problems Resolved During this Admission:    Diagnosis Date Noted Date Resolved POA    Fever [R50.9] 03/22/2019 03/25/2019 No    Sepsis [A41.9] 03/16/2019 03/22/2019 Yes    Chest pain [R07.9] 03/15/2019 03/15/2019 Yes    Unintentional poisoning by beta-adrenoreceptor antagonist [T44.7X1A] 03/15/2019 03/22/2019 Yes    On mechanically assisted ventilation [Z99.11] 03/15/2019 03/22/2019 Not Applicable      Discharged Condition: fair    Disposition:     Follow Up:  Follow-up Information     Bacilio Larry MD In 2 weeks.    Specialty:  Internal Medicine  Contact information:  [address] [facility] [address]  [phone]             Brandon Calhoun MD In 2 weeks.    Specialties:  Electrophysiology, Cardiology  Contact information:  [address] [facility] [id number] [phone]                 Patient Instructions:      TRANSFER TUB BENCH FOR HOME USE     Order Specific Question Answer Comments   Type of Transfer Tub Bench: Padded    Height: 5'  "5.98" (1.676 m)    Weight: 76.5 kg (168 lb 8.7 oz)    Does patient have medical equipment at home? none    Length of need (1-99 months): 99      WALKER FOR HOME USE     Order Specific Question Answer Comments   Type of Walker: Adult (5'4"-6'6")    With wheels? Yes    Height: 5' 5.98" (1.676 m)    Weight: 76.5 kg (168 lb 8.7 oz)    Length of need (1-99 months): 99    Platform attachment: Bilateral    Does patient have medical equipment at home? none    Please check all that apply: Patient's condition impairs ambulation.    Please check all that apply: Patient is unable to safely ambulate without equipment.    Please check all that apply: Altered sensory perception.    Please check all that apply: Walker will be used for gait training.      No driving until:     Notify your health care provider if you experience any of the following:  temperature >100.4     Notify your health care provider if you experience any of the following:  persistent nausea and vomiting or diarrhea     Notify your health care provider if you experience any of the following:  severe uncontrolled pain     Notify your health care provider if you experience any of the following:  redness, tenderness, or signs of infection (pain, swelling, redness, odor or green/yellow discharge around incision site)     Notify your health care provider if you experience any of the following:  difficulty breathing or increased cough     Notify your health care provider if you experience any of the following:  severe persistent headache     Notify your health care provider if you experience any of the following:  worsening rash     Notify your health care provider if you experience any of the following:  persistent dizziness, light-headedness, or visual disturbances     Notify your health care provider if you experience any of the following:  increased confusion or weakness     Activity as tolerated     Medications:  Reconciled Home Medications:      Medication List "      START taking these medications    cyanocobalamin 1000 MCG tablet  Commonly known as:  VITAMIN B-12  Take 1 tablet (1,000 mcg total) by mouth once daily.  Start taking on:  3/30/2019     folic acid 1 MG tablet  Commonly known as:  FOLVITE  Take 1 tablet (1 mg total) by mouth once daily.  Start taking on:  3/30/2019     mirtazapine 7.5 MG Tab  Commonly known as:  REMERON  Take 1 tablet (7.5 mg total) by mouth every evening.     multivitamin tablet  Commonly known as:  THERAGRAN  Take 1 tablet by mouth once daily.  Start taking on:  3/30/2019     pantoprazole 40 MG tablet  Commonly known as:  PROTONIX  Take 1 tablet (40 mg total) by mouth once daily.  Start taking on:  3/30/2019     thiamine 100 MG tablet  Take 1 tablet (100 mg total) by mouth once daily.  Start taking on:  3/30/2019        CHANGE how you take these medications    metoprolol tartrate 25 MG tablet  Commonly known as:  LOPRESSOR  Take 0.5 tablets (12.5 mg total) by mouth 2 (two) times daily.  What changed:    · how much to take  · when to take this  · reasons to take this        CONTINUE taking these medications    allopurinol 100 MG tablet  Commonly known as:  ZYLOPRIM  TAKE 2 TABLETS BY MOUTH DAILY     apixaban 5 mg Tab  Commonly known as:  ELIQUIS  Take 1 tablet (5 mg total) by mouth 2 (two) times daily.     aspirin 81 MG Chew  Take 1 tablet (81 mg total) by mouth once daily.     fenofibrate 160 MG Tab  Take 1 tablet (160 mg total) by mouth every evening.     omega-3 acid ethyl esters 1 gram capsule  Commonly known as:  LOVAZA  Take 2 g by mouth 2 (two) times daily.     ranitidine 150 MG capsule  Commonly known as:  ZANTAC  Take 150 mg by mouth 2 (two) times daily.     rosuvastatin 10 MG tablet  Commonly known as:  CRESTOR  Take 1 tablet (10 mg total) by mouth once daily.        STOP taking these medications    LORazepam 0.5 MG tablet  Commonly known as:  ATIVAN     venlafaxine 37.5 MG Tab  Commonly known as:  EFFEXOR        ASK your doctor about  these medications    diltiaZEM 120 MG Cdcr  Commonly known as:  DILACOR XR  Take 1 capsule (120 mg total) by mouth once daily.     flecainide 150 MG Tab  Commonly known as:  TAMBOCOR  Take 1 tablet (150 mg total) by mouth 2 (two) times daily.            Significant Diagnostic Studies:    Pending Diagnostic Studies:     None        Indwelling Lines/Drains at time of discharge:   Lines/Drains/Airways          None          Time spent on the discharge of patient: 45 minutes  Patient was seen and examined on the date of discharge and determined to be suitable for discharge.         WASHINGTON Vigil MD  Department of Hospital Medicine  [facility]     No

## 2023-01-17 NOTE — SPEECH LANGUAGE PATHOLOGY EVALUATION - SLP DIAGNOSIS
Patient demonstrates functional cognitive linguistic abilities, expressive and receptive language and speech/voice. Patient appears to be functioning at baseline with no deficits noted.

## 2023-01-17 NOTE — DISCHARGE NOTE PROVIDER - PROVIDER TOKENS
FREE:[LAST:[Dr. Spear- Neurology],PHONE:[(   )    -],FAX:[(   )    -]],FREE:[LAST:[PCP],PHONE:[(   )    -],FAX:[(   )    -]]

## 2023-01-17 NOTE — PROGRESS NOTE ADULT - TIME BILLING
Pt seen + examined. Case discussed with resident. Agree with assessment and plan above (edited by me in detail):  Time spent: 55min. More than 50% of the visit was spent counseling the patient on medical condition ad coordination of care

## 2023-01-18 ENCOUNTER — TRANSCRIPTION ENCOUNTER (OUTPATIENT)
Age: 83
End: 2023-01-18

## 2023-01-18 VITALS
RESPIRATION RATE: 18 BRPM | SYSTOLIC BLOOD PRESSURE: 157 MMHG | TEMPERATURE: 99 F | HEART RATE: 80 BPM | DIASTOLIC BLOOD PRESSURE: 77 MMHG | OXYGEN SATURATION: 98 %

## 2023-01-18 DIAGNOSIS — I63.9 CEREBRAL INFARCTION, UNSPECIFIED: ICD-10-CM

## 2023-01-18 DIAGNOSIS — A41.9 SEPSIS, UNSPECIFIED ORGANISM: ICD-10-CM

## 2023-01-18 LAB
ANION GAP SERPL CALC-SCNC: 7 MMOL/L — SIGNIFICANT CHANGE UP (ref 5–17)
BASOPHILS # BLD AUTO: 0.02 K/UL — SIGNIFICANT CHANGE UP (ref 0–0.2)
BASOPHILS NFR BLD AUTO: 0.2 % — SIGNIFICANT CHANGE UP (ref 0–2)
BUN SERPL-MCNC: 10 MG/DL — SIGNIFICANT CHANGE UP (ref 7–23)
CALCIUM SERPL-MCNC: 8.5 MG/DL — SIGNIFICANT CHANGE UP (ref 8.5–10.1)
CHLORIDE SERPL-SCNC: 105 MMOL/L — SIGNIFICANT CHANGE UP (ref 96–108)
CO2 SERPL-SCNC: 22 MMOL/L — SIGNIFICANT CHANGE UP (ref 22–31)
CREAT SERPL-MCNC: 0.94 MG/DL — SIGNIFICANT CHANGE UP (ref 0.5–1.3)
EGFR: 80 ML/MIN/1.73M2 — SIGNIFICANT CHANGE UP
EOSINOPHIL # BLD AUTO: 0.1 K/UL — SIGNIFICANT CHANGE UP (ref 0–0.5)
EOSINOPHIL NFR BLD AUTO: 1 % — SIGNIFICANT CHANGE UP (ref 0–6)
GLUCOSE SERPL-MCNC: 187 MG/DL — HIGH (ref 70–99)
HCT VFR BLD CALC: 37.8 % — LOW (ref 39–50)
HGB BLD-MCNC: 12.6 G/DL — LOW (ref 13–17)
IGE SERPL-ACNC: 18 KU/L — SIGNIFICANT CHANGE UP
IMM GRANULOCYTES NFR BLD AUTO: 0.7 % — SIGNIFICANT CHANGE UP (ref 0–0.9)
LDH SERPL L TO P-CCNC: 228 U/L — SIGNIFICANT CHANGE UP (ref 50–242)
LYMPHOCYTES # BLD AUTO: 1.89 K/UL — SIGNIFICANT CHANGE UP (ref 1–3.3)
LYMPHOCYTES # BLD AUTO: 18.2 % — SIGNIFICANT CHANGE UP (ref 13–44)
MCHC RBC-ENTMCNC: 32 PG — SIGNIFICANT CHANGE UP (ref 27–34)
MCHC RBC-ENTMCNC: 33.3 GM/DL — SIGNIFICANT CHANGE UP (ref 32–36)
MCV RBC AUTO: 95.9 FL — SIGNIFICANT CHANGE UP (ref 80–100)
MONOCYTES # BLD AUTO: 0.98 K/UL — HIGH (ref 0–0.9)
MONOCYTES NFR BLD AUTO: 9.4 % — SIGNIFICANT CHANGE UP (ref 2–14)
MRSA PCR RESULT.: SIGNIFICANT CHANGE UP
NEUTROPHILS # BLD AUTO: 7.34 K/UL — SIGNIFICANT CHANGE UP (ref 1.8–7.4)
NEUTROPHILS NFR BLD AUTO: 70.5 % — SIGNIFICANT CHANGE UP (ref 43–77)
NRBC # BLD: 0 /100 WBCS — SIGNIFICANT CHANGE UP (ref 0–0)
PLATELET # BLD AUTO: 264 K/UL — SIGNIFICANT CHANGE UP (ref 150–400)
POTASSIUM SERPL-MCNC: 3.6 MMOL/L — SIGNIFICANT CHANGE UP (ref 3.5–5.3)
POTASSIUM SERPL-SCNC: 3.6 MMOL/L — SIGNIFICANT CHANGE UP (ref 3.5–5.3)
RBC # BLD: 3.94 M/UL — LOW (ref 4.2–5.8)
RBC # FLD: 12.6 % — SIGNIFICANT CHANGE UP (ref 10.3–14.5)
S AUREUS DNA NOSE QL NAA+PROBE: SIGNIFICANT CHANGE UP
SODIUM SERPL-SCNC: 134 MMOL/L — LOW (ref 135–145)
WBC # BLD: 10.4 K/UL — SIGNIFICANT CHANGE UP (ref 3.8–10.5)
WBC # FLD AUTO: 10.4 K/UL — SIGNIFICANT CHANGE UP (ref 3.8–10.5)

## 2023-01-18 PROCEDURE — 85025 COMPLETE CBC W/AUTO DIFF WBC: CPT

## 2023-01-18 PROCEDURE — 87637 SARSCOV2&INF A&B&RSV AMP PRB: CPT

## 2023-01-18 PROCEDURE — 80048 BASIC METABOLIC PNL TOTAL CA: CPT

## 2023-01-18 PROCEDURE — 85610 PROTHROMBIN TIME: CPT

## 2023-01-18 PROCEDURE — 87640 STAPH A DNA AMP PROBE: CPT

## 2023-01-18 PROCEDURE — 71045 X-RAY EXAM CHEST 1 VIEW: CPT

## 2023-01-18 PROCEDURE — 99239 HOSP IP/OBS DSCHRG MGMT >30: CPT | Mod: GC

## 2023-01-18 PROCEDURE — 71275 CT ANGIOGRAPHY CHEST: CPT

## 2023-01-18 PROCEDURE — 83036 HEMOGLOBIN GLYCOSYLATED A1C: CPT

## 2023-01-18 PROCEDURE — 94760 N-INVAS EAR/PLS OXIMETRY 1: CPT

## 2023-01-18 PROCEDURE — 97161 PT EVAL LOW COMPLEX 20 MIN: CPT

## 2023-01-18 PROCEDURE — 70498 CT ANGIOGRAPHY NECK: CPT | Mod: MA

## 2023-01-18 PROCEDURE — 84484 ASSAY OF TROPONIN QUANT: CPT

## 2023-01-18 PROCEDURE — 82803 BLOOD GASES ANY COMBINATION: CPT

## 2023-01-18 PROCEDURE — 84443 ASSAY THYROID STIM HORMONE: CPT

## 2023-01-18 PROCEDURE — 70496 CT ANGIOGRAPHY HEAD: CPT | Mod: MA

## 2023-01-18 PROCEDURE — 74177 CT ABD & PELVIS W/CONTRAST: CPT

## 2023-01-18 PROCEDURE — 87641 MR-STAPH DNA AMP PROBE: CPT

## 2023-01-18 PROCEDURE — 80053 COMPREHEN METABOLIC PANEL: CPT

## 2023-01-18 PROCEDURE — 92610 EVALUATE SWALLOWING FUNCTION: CPT

## 2023-01-18 PROCEDURE — 87040 BLOOD CULTURE FOR BACTERIA: CPT

## 2023-01-18 PROCEDURE — 83605 ASSAY OF LACTIC ACID: CPT

## 2023-01-18 PROCEDURE — 99291 CRITICAL CARE FIRST HOUR: CPT | Mod: 25

## 2023-01-18 PROCEDURE — 86140 C-REACTIVE PROTEIN: CPT

## 2023-01-18 PROCEDURE — 96365 THER/PROPH/DIAG IV INF INIT: CPT

## 2023-01-18 PROCEDURE — 85048 AUTOMATED LEUKOCYTE COUNT: CPT

## 2023-01-18 PROCEDURE — 70450 CT HEAD/BRAIN W/O DYE: CPT | Mod: MA

## 2023-01-18 PROCEDURE — 80061 LIPID PANEL: CPT

## 2023-01-18 PROCEDURE — 81003 URINALYSIS AUTO W/O SCOPE: CPT

## 2023-01-18 PROCEDURE — 93005 ELECTROCARDIOGRAM TRACING: CPT

## 2023-01-18 PROCEDURE — 0042T: CPT | Mod: MA

## 2023-01-18 PROCEDURE — 97165 OT EVAL LOW COMPLEX 30 MIN: CPT

## 2023-01-18 PROCEDURE — 82785 ASSAY OF IGE: CPT

## 2023-01-18 PROCEDURE — 92523 SPEECH SOUND LANG COMPREHEN: CPT

## 2023-01-18 PROCEDURE — 36415 COLL VENOUS BLD VENIPUNCTURE: CPT

## 2023-01-18 PROCEDURE — 82962 GLUCOSE BLOOD TEST: CPT

## 2023-01-18 PROCEDURE — 70551 MRI BRAIN STEM W/O DYE: CPT

## 2023-01-18 PROCEDURE — 93306 TTE W/DOPPLER COMPLETE: CPT

## 2023-01-18 PROCEDURE — 94640 AIRWAY INHALATION TREATMENT: CPT

## 2023-01-18 PROCEDURE — 83615 LACTATE (LD) (LDH) ENZYME: CPT

## 2023-01-18 PROCEDURE — 96367 TX/PROPH/DG ADDL SEQ IV INF: CPT

## 2023-01-18 PROCEDURE — 85730 THROMBOPLASTIN TIME PARTIAL: CPT

## 2023-01-18 RX ORDER — ALBUTEROL 90 UG/1
2 AEROSOL, METERED ORAL EVERY 6 HOURS
Refills: 0 | Status: DISCONTINUED | OUTPATIENT
Start: 2023-01-18 | End: 2023-01-18

## 2023-01-18 RX ORDER — ASPIRIN/CALCIUM CARB/MAGNESIUM 324 MG
1 TABLET ORAL
Qty: 0 | Refills: 0 | DISCHARGE
Start: 2023-01-18

## 2023-01-18 RX ADMIN — PANTOPRAZOLE SODIUM 40 MILLIGRAM(S): 20 TABLET, DELAYED RELEASE ORAL at 06:01

## 2023-01-18 RX ADMIN — ACETAZOLAMIDE 125 MILLIGRAM(S): 250 TABLET ORAL at 13:48

## 2023-01-18 RX ADMIN — PIPERACILLIN AND TAZOBACTAM 25 GRAM(S): 4; .5 INJECTION, POWDER, LYOPHILIZED, FOR SOLUTION INTRAVENOUS at 06:01

## 2023-01-18 RX ADMIN — Medication 81 MILLIGRAM(S): at 13:48

## 2023-01-18 RX ADMIN — LOSARTAN POTASSIUM 100 MILLIGRAM(S): 100 TABLET, FILM COATED ORAL at 05:59

## 2023-01-18 RX ADMIN — ALBUTEROL 2.5 MILLIGRAM(S): 90 AEROSOL, METERED ORAL at 07:59

## 2023-01-18 RX ADMIN — FINASTERIDE 5 MILLIGRAM(S): 5 TABLET, FILM COATED ORAL at 13:47

## 2023-01-18 RX ADMIN — Medication 20 MILLIGRAM(S): at 05:59

## 2023-01-18 RX ADMIN — Medication 1: at 08:38

## 2023-01-18 RX ADMIN — ENOXAPARIN SODIUM 40 MILLIGRAM(S): 100 INJECTION SUBCUTANEOUS at 13:48

## 2023-01-18 RX ADMIN — DULOXETINE HYDROCHLORIDE 30 MILLIGRAM(S): 30 CAPSULE, DELAYED RELEASE ORAL at 13:47

## 2023-01-18 RX ADMIN — Medication 5: at 12:54

## 2023-01-18 RX ADMIN — MONTELUKAST 10 MILLIGRAM(S): 4 TABLET, CHEWABLE ORAL at 13:48

## 2023-01-18 RX ADMIN — Medication 2: at 17:20

## 2023-01-18 NOTE — DISCHARGE NOTE NURSING/CASE MANAGEMENT/SOCIAL WORK - NSDCPEFALRISK_GEN_ALL_CORE
For information on Fall & Injury Prevention, visit: [email or web address] OR  [email or web address] OR  https://www.cdc.gov/steadi/patient.html

## 2023-01-18 NOTE — CARE COORDINATION ASSESSMENT. - NSDCPLANSERVICES_GEN_ALL_CORE
Declined for home care visiting nurse and would like to follow up with outpatient providers upon discharge. CM will fax discharge documentation to [facility] to resume the patient's daughter as the CDPAP aide when medically cleared./Infusion Therapy/No Anticipated Discharge Needs

## 2023-01-18 NOTE — PROGRESS NOTE ADULT - PROVIDER SPECIALTY LIST ADULT
Hospitalist
Neurology
Infectious Disease
Infectious Disease
Pulmonology
Pulmonology
Hospitalist

## 2023-01-18 NOTE — CARE COORDINATION ASSESSMENT. - NSCAREPROVIDERS_GEN_ALL_CORE_FT
CARE PROVIDERS:  Accepting Physician: Luciano Mi  Administration: Armaan Gates  Administration: Francis Ferraro  Admitting: Luciano Mi  Attending: Zak Juan  Case Management: Ami Wheeler  Consultant: Isrrael Noonan  Consultant: Sarah Waggoner  Consultant: Keron Del Rio  Consultant: Weil, Patricia  Consultant: Robbi Novak  ED Attending: Christiano Duarte  ED Nurse: Edgar Gonsalez  Nurse: Rina Ramirez  Ordered: ServiceAccount, SCMMLM  Ordered: Doctor, Unknown  Ordered: ADM, User  PCA/Nursing Assistant: Ronna Kowalski  PCA/Nursing Assistant: Araceli Madera  Physical Therapy: Angle Everett  Primary Team: Salinas Reno  Primary Team: Cata Warren  Primary Team: Jonn Templeton  Primary Team: Zak Juan  Primary Team: Pema Valentine  Primary Team: Laila Patel  Primary Team: Georgie Middleton  Primary Team: Bria Miller  Primary Team: Keyla Sam  Primary Team: Jonn Garcia  Quality Review: Keke Hale  Registered Dietitian: Hillary Mortensen  Respiratory Therapy: Yovani Hayes   CARE PROVIDERS:  Accepting Physician: Luciano Mi  Administration: Armaan Gates  Administration: Charles Altamirano  Administration: Francis Ferraro  Admitting: Luciano Mi  Attending: Zak Juan  Case Management: Ami Wheeler  Consultant: Isrrael Noonan  Consultant: Sarah Waggoner  Consultant: Keron Del Rio  Consultant: Weil, Patricia  Consultant: Robbi Novak  ED Attending: Christiano Duarte  ED Nurse: Edgar Gonsalez  Nurse: Rina Ramirez  Ordered: ServiceAccount, SCMMLM  Ordered: Doctor, Unknown  Ordered: ADM, User  PCA/Nursing Assistant: Ronna Kowalski  PCA/Nursing Assistant: Araceli Madera  Physical Therapy: Angle Everett  Primary Team: Salinas Reno  Primary Team: Cata Warren  Primary Team: Jonn Templeton  Primary Team: Zak Juan  Primary Team: Pema Valentine  Primary Team: Laila Patel  Primary Team: Georgie Middleton  Primary Team: Bria Miller  Primary Team: Keyla Sam  Primary Team: Jonn Garcia  Quality Review: Keke Hale  Registered Dietitian: Hillary Mortensen  Respiratory Therapy: Yovani Hayes

## 2023-01-18 NOTE — PROGRESS NOTE ADULT - SUBJECTIVE AND OBJECTIVE BOX
Neurology Follow up note    DARIELA [id number]    HPI:  Patient is an 83 y/o M w/ PMH of HTN, HLD, history of prior TIA (2022 adm to [facility]), Type 2 Diabetes Mellitus, hydrocephalus, BPH, and asthma BIBA for R sided weakness and cough. Patient Romansh speaking, son translated. Per the son patient with non-productive cough x 3 days. Went to sleep at 9PM on 1/15, last known well. At 3 AM the patient was awake and coughing. The son checked on the patient who was noted to have difficulty arising from bed with some R sided weakness, experiencing a coughing fit with SOB, rigors and appeared to have had urinated and defecated on himself. Son endorsed patient had an inability to ambulate at that point. Of note this was the patient's first episode of incontinence. Patient w/ history of spinal injection for back pain ~ 1 week ago. Upon arrival to the ED the patient had an episode of emesis, O2 sats about 91-92%; improvement noted on NC. Concern for possible aspiration though per charting patient was sitting upright when he vomited, NBNB emesis. No sick contacts per son though patient does go to  M,W,F.    In the ED:  VS: T 97.7, , 138/73, RR 16, 95% on RA  S/p: Tylenol x 1, vanc + zosyn, 1 L NS bolus x 2  EKG: Sinus tach, rate 123, LAD, motion artifact  Labs: WBC 18; Lacte 2.8;     Imaging:  CXR: CXR clear  CT Head w/o: No evidence of acute intracranial hemorrhage, midline shift or CT evidence of acute territorial infarct. Mild ventriculomegaly without significant change from the prior MRI of the brain dated 2022.   CT PERFUSION: No core infarct or penumbra of ischemic tissue is identified by CT perfusion.  CT ANGIOGRAPHY NECK: Patent cervical vasculature. No hemodynamically significant carotid stenosis by NASCET criteria or flow-limiting vertebral artery stenosis.  No evidence of dissection.  CT ANGIOGRAPHY BRAIN: No vessel occlusion, flow-limiting stenosis or aneurysm. (2023 08:36)      Interval History -doing well    Patient is seen, chart was reviewed and case was discussed with the treatment team.  Pt is not in any distress.   Lying on bed comfortably.   No events reported overnight.       Vital Signs Last 24 Hrs  T(C): 36.9 (2023 15:13), Max: 37.1 (2023 04:45)  T(F): 98.4 (2023 15:13), Max: 98.8 (2023 04:45)  HR: 78 (2023 15:24) (77 - 100)  BP: 157/84 (2023 15:13) (126/82 - 157/84)  BP(mean): --  RR: 16 (2023 15:13) (16 - 18)  SpO2: 95% (2023 15:24) (94% - 97%)    Parameters below as of 2023 15:24  Patient On (Oxygen Delivery Method): room air            REVIEW OF SYSTEMS:    Constitutional: No fever, weight loss or fatigue  Eyes: No eye pain, visual disturbances, or discharge  ENT:  No difficulty hearing, tinnitus, vertigo; No sinus or throat pain  Neck: No pain or stiffness  Respiratory: No , wheezing, chills or hemoptysis  Cardiovascular: No chest pain, palpitations, shortness of breath, dizziness or leg swelling  Gastrointestinal: No abdominal or epigastric pain. No nausea, vomiting or hematemesis; No diarrhea or constipation. No melena or hematochezia.  Genitourinary: No dysuria, frequency, hematuria or incontinence  Neurological: No headaches, memory loss, loss of strength, numbness or tremors  Psychiatric: No depression, anxiety, mood swings or difficulty sleeping  Musculoskeletal: No joint pain or swelling; No muscle, back or extremity pain  Skin: No itching, burning, rashes or lesions   Lymph Nodes: No enlarged glands  Endocrine: No heat or cold intolerance; No hair loss, No h/o diabetes or thyroid dysfunction  Allergy and Immunologic: No hives or eczema    On Neurological Examination:    Mental Status - Pt is alert, awake,  Follows commands well and able to answer questions appropriately.Mood and affect  normal    Speech -  Normal.    Cranial Nerves - Pupils 3 mm equal and reactive to light, extraocular eye movements intact. Pt has no visual field deficit.  Pt has no  facial asymmetry. Facial sensation is intact.Tongue - is in midline.    Muscle tone - is normal all over. Moves all extremities equally. No asymmetry is seen.      Motor Exam - 5/5 all over, No drift. No shaking or tremors.    Sensory Exam - Pt withdraws all extremities equally on stimulation. No asymmetry seen. No complaints of tingling, numbness.    Gait - Able to stand and walk unassisted.      coordination:    Finger to nose: normal      Deep tendon Reflexes - 2 plus all over.        Romberg - Negative.    Neck Supple -  Yes.     MEDICATIONS    acetaminophen     Tablet .. 650 milliGRAM(s) Oral every 6 hours PRN  acetaZOLAMIDE    Tablet 125 milliGRAM(s) Oral daily  albuterol    0.083% 2.5 milliGRAM(s) Nebulizer every 8 hours  aspirin  chewable 81 milliGRAM(s) Oral daily  dextrose 5%. 1000 milliLiter(s) IV Continuous <Continuous>  dextrose 5%. 1000 milliLiter(s) IV Continuous <Continuous>  dextrose 50% Injectable 25 Gram(s) IV Push once  dextrose 50% Injectable 12.5 Gram(s) IV Push once  dextrose 50% Injectable 25 Gram(s) IV Push once  dextrose Oral Gel 15 Gram(s) Oral once PRN  DULoxetine 30 milliGRAM(s) Oral daily  enoxaparin Injectable 40 milliGRAM(s) SubCutaneous every 24 hours  finasteride 5 milliGRAM(s) Oral daily  glucagon  Injectable 1 milliGRAM(s) IntraMuscular once  insulin lispro (ADMELOG) corrective regimen sliding scale   SubCutaneous three times a day before meals  insulin lispro (ADMELOG) corrective regimen sliding scale   SubCutaneous at bedtime  latanoprost 0.005% Ophthalmic Solution 1 Drop(s) Both EYES at bedtime  losartan 100 milliGRAM(s) Oral daily  melatonin 3 milliGRAM(s) Oral at bedtime PRN  montelukast 10 milliGRAM(s) Oral daily  ondansetron Injectable 4 milliGRAM(s) IV Push every 8 hours PRN  pantoprazole    Tablet 40 milliGRAM(s) Oral before breakfast  piperacillin/tazobactam IVPB.. 3.375 Gram(s) IV Intermittent every 8 hours  predniSONE   Tablet 20 milliGRAM(s) Oral daily  simvastatin 20 milliGRAM(s) Oral at bedtime  tamsulosin 0.4 milliGRAM(s) Oral at bedtime      Allergies    No Known Allergies    Intolerances        LABS:  CBC Full  -  ( 2023 07:00 )  WBC Count : 13.49 K/uL  RBC Count : 4.11 M/uL  Hemoglobin : 13.2 g/dL  Hematocrit : 39.1 %  Platelet Count - Automated : 259 K/uL  Mean Cell Volume : 95.1 fl  Mean Cell Hemoglobin : 32.1 pg  Mean Cell Hemoglobin Concentration : 33.8 gm/dL  Auto Neutrophil # : 11.10 K/uL  %    Urinalysis Basic - ( 2023 06:25 )    Color: Pale Yellow / Appearance: Clear / S.005 / pH: x  Gluc: x / Ketone: Trace  / Bili: Negative / Urobili: Negative   Blood: x / Protein: Negative / Nitrite: Negative   Leuk Esterase: Negative / RBC: x / WBC x   Sq Epi: x / Non Sq Epi: x / Bacteria: x          133<L>  |  104  |  14  ----------------------------<  197<H>  3.7   |  24  |  1.00    Ca    8.4<L>      2023 07:00    TPro  6.4  /  Alb  3.3  /  TBili  0.6  /  DBili  x   /  AST  36  /  ALT  50  /  AlkPhos  64      Hemoglobin A1C:   Lipid Panel  @ 07:00  Total Cholesterol, Serum 121  LDL --  Triglycerides 99    Vitamin B12     RADIOLOGY  < from: MR Head No Cont (23 @ 13:42) >    ACC: [id number] EXAM:  MR BRAIN                          PROCEDURE DATE:  2023          INTERPRETATION:  Exam Date: 2023 1:42 PM    MR brain  without gadolinium    CLINICAL INFORMATION:  TIA    TECHNIQUE:   Sagittal and axial T1-weighted images, axial FLAIR images,   gradient echo, axial  T2-weighted images and axial diffusion weighted   images of the brain were obtained.    FINDINGS:   CT head of 2023 is available for review.    There is severe generalized cerebral volume loss, with a prominence in   the biparietal and temporal lobes. Enlargement of the ventricles,   particularly within the posterior horns, atria, and temporal horns of the   ventricles, likely proportional to degree of volume loss, however NPH   cannot be entirely excluded. There is no evidence of acute infarct,   hemorrhage, or mass lesion. There are scattered foci of FLAIR   hyperintensity in the periventricular and subcortical white matter of the   bilateral cerebri, compatible with mild chronic microvascular ischemic   changes. There is no midline shift or herniation pattern. No mass effect   is found in the brain.    The vertebral and internal carotid arteries demonstrate expected flow   voids indicating their patency.    The paranasal sinuses are clear.  Status post bilateral lens replacements.      IMPRESSION:   No acute infarct. There is severe generalized cerebral   volume loss, with a prominence in the biparietal and temporal lobes.   Enlargement of the ventricles, particularly within the posterior horns,   atria, and temporal horns of the ventricles, likely proportional to   degree of volume loss, however NPH cannot be entirely excluded. Mild   periventricular and subcortical white matter chronic microvascular   ischemic changes.    --- End of Report ---            MARIMAR RUSSO MD; Attending Radiologist  This document has been electronically signed. 2023  1:49PM    ASSESSMENT AND PLAN:    seen for right sided weakness  prob TIA      continue ap/statin  Physical therapy evaluation.  OOB to chair/ambulation with assistance only  Pain is accessed and addressed.  Would continue to follow.              
     CODIDARIELA is a yMale , patient examined and chart reviewed.     INTERVAL HPI/ OVERNIGHT EVENTS:   Feeling better. No events. Son at bedside.  Afebrile.    PAST MEDICAL & SURGICAL HISTORY:  Diabetes  Hypertension  Hypercholesteremia  Asthma  Benign prostatic hyperplasia  No significant past surgical history      For details regarding the patient's social history, family history, and other miscellaneous elements, please refer the initial infectious diseases consultation and/or the admitting history and physical examination for this admission.    ROS:  CONSTITUTIONAL:  Negative fever or chills  EYES:  Negative  blurry vision or double vision  CARDIOVASCULAR:  Negative for chest pain or palpitations  RESPIRATORY:  Negative for cough, wheezing, or SOB   GASTROINTESTINAL:  Negative for nausea, vomiting, diarrhea, constipation, or abdominal pain  GENITOURINARY:  Negative frequency, urgency or dysuria  NEUROLOGIC:  No headache, confusion, dizziness, lightheadedness  All other systems were reviewed and are negative         Current inpatient medications :    ANTIBIOTICS/RELEVANT:    MEDICATIONS  (STANDING):  acetaZOLAMIDE    Tablet 125 milliGRAM(s) Oral daily  aspirin  chewable 81 milliGRAM(s) Oral daily  dextrose 5%. 1000 milliLiter(s) (50 mL/Hr) IV Continuous <Continuous>  dextrose 5%. 1000 milliLiter(s) (100 mL/Hr) IV Continuous <Continuous>  dextrose 50% Injectable 25 Gram(s) IV Push once  dextrose 50% Injectable 12.5 Gram(s) IV Push once  dextrose 50% Injectable 25 Gram(s) IV Push once  DULoxetine 30 milliGRAM(s) Oral daily  enoxaparin Injectable 40 milliGRAM(s) SubCutaneous every 24 hours  finasteride 5 milliGRAM(s) Oral daily  glucagon  Injectable 1 milliGRAM(s) IntraMuscular once  insulin lispro (ADMELOG) corrective regimen sliding scale   SubCutaneous three times a day before meals  insulin lispro (ADMELOG) corrective regimen sliding scale   SubCutaneous at bedtime  latanoprost 0.005% Ophthalmic Solution 1 Drop(s) Both EYES at bedtime  losartan 100 milliGRAM(s) Oral daily  montelukast 10 milliGRAM(s) Oral daily  pantoprazole    Tablet 40 milliGRAM(s) Oral before breakfast  predniSONE   Tablet 20 milliGRAM(s) Oral daily  simvastatin 20 milliGRAM(s) Oral at bedtime  tamsulosin 0.4 milliGRAM(s) Oral at bedtime    MEDICATIONS  (PRN):  acetaminophen     Tablet .. 650 milliGRAM(s) Oral every 6 hours PRN Temp greater or equal to 38C (100.4F), Mild Pain (1 - 3)  albuterol    90 MICROgram(s) HFA Inhaler 2 Puff(s) Inhalation every 6 hours PRN Shortness of Breath and/or Wheezing  dextrose Oral Gel 15 Gram(s) Oral once PRN Blood Glucose LESS THAN 70 milliGRAM(s)/deciliter  melatonin 3 milliGRAM(s) Oral at bedtime PRN Insomnia  ondansetron Injectable 4 milliGRAM(s) IV Push every 8 hours PRN Nausea and/or Vomiting      Objective:  Vital Signs Last 24 Hrs  T(C): 37 (2023 11:54), Max: 37 (2023 11:54)  T(F): 98.6 (2023 11:54), Max: 98.6 (2023 11:54)  HR: 80 (2023 11:54) (62 - 80)  BP: 157/77 (2023 11:54) (134/74 - 158/80)  RR: 18 (2023 11:54) (16 - 19)  SpO2: 98% (2023 11:54) (94% - 98%)    Parameters below as of 2023 11:54  Patient On (Oxygen Delivery Method): room air      Physical Exam:  General: no acute distress  Neck: supple, trachea midline  Lungs: clear, no wheeze/rhonchi  Cardiovascular: regular rate and rhythm, S1 S2  Abdomen: soft, nontender,  bowel sounds normal  Neurological: alert and oriented x3  Skin: no rash  Extremities: no edema        LABS:                        12.6   10.40 )-----------( 264      ( 2023 06:13 )             37.8   01-18    134<L>  |  105  |  10  ----------------------------<  187<H>  3.6   |  22  |  0.94    Ca    8.5      2023 06:13    TPro  6.4  /  Alb  3.3  /  TBili  0.6  /  DBili  x   /  AST  36  /  ALT  50  /  AlkPhos  64  01-17    Urinalysis Basic - ( 2023 06:25 )    Color: Pale Yellow / Appearance: Clear / S.005 / pH: x  Gluc: x / Ketone: Trace  / Bili: Negative / Urobili: Negative   Blood: x / Protein: Negative / Nitrite: Negative   Leuk Esterase: Negative / RBC: x / WBC x   Sq Epi: x / Non Sq Epi: x / Bacteria: x    MICROBIOLOGY:    Culture - Blood (collected 2023 08:03)  Source: .Blood Blood  Preliminary Report (2023 12:02):    No growth to date.    Culture - Blood (collected 2023 07:53)  Source: .Blood Blood  Preliminary Report (2023 12:02):    No growth to date.      RADIOLOGY & ADDITIONAL STUDIES:    ACC: [id number] EXAM:  CT ABDOMEN AND PELVIS IC                        ACC: [id number] EXAM:  CT ANGIO CHEST PULM ART [facility]                          PROCEDURE DATE:  2023          INTERPRETATION:  Reason for Exam: Shortness of breath. chest pain.    CTA of the chest was performed from the thoracic inlet to the level of   the adrenal glands following IV contrast injection of  80 cc of Omnipaque   350. No immediate complications were reported.  MIP images were also   created and reviewed. CT ofthe abdomen and pelvis was also obtained.    Comparison: Chest x-ray of same date    Tubes/Lines: None    Mediastinum and Heart: Aorta and pulmonary arteries are normal in size.   Thyroid gland is unremarkable. No lymphadenopathy. No pericardial   effusion.    Lungs, Pleura, and Airways: There is no pulmonary embolus. No   consolidations, edema, effusion, or pneumothorax.    CT abdomen and pelvis:    The liver, spleen, pancreas and adrenals are unremarkable. Both kidneys   enhance symmetrically. No stones or hydronephrosis. Intraabdominal   vasculature is within normal limits. No lymphadenopathy.    Pelvic organs within normal limits. No evidence of bowel obstruction. The   appendix is not clearly seen. No acute bony abnormality.    IMPRESSION:    CTA CHEST: No pulmonary embolus.    CT abdomen and pelvis: No acute findings    Assessment :  81 y/o M w/ PMH of HTN, HLD, history of prior TIA (2022 adm to [facility]), type 2 diabetes mellitus, hydrocephalus, BPH, and asthma BIBA for R sided weakness and cough meets sepsis criteria, admitted for TIA and sepsis workup.  Tmax 101.4 yesterday No further fevers  WBC downtrending  CT CAP neg fo ractue pathology  MRI brain neg  Cultures NGTD  ?viral illness  Clinically better    Plan:   Agree with to dc Zosyn and monitor off antibioitics  Cultures and imaging neg  Trend temps/WBC  Asp precautions  Pulm toileting  Increase activity  Stable from ID standpoint  Dc home    D/w Dr Juan    D/w pt's son in detail. Questions answered.    Continue with present regiment.  Appropriate use of antibiotics and adverse effects reviewed.      > 35 minutes were spent in direct patient care reviewing notes, medications ,labs data/ imaging , discussion with multidisciplinary team.    Thank you for allowing me to participate in care of your patient .    Sarah Waggoner MD  Infectious Disease  [phone]
     DARIELA KINCAID is a 82yMale , patient examined and chart reviewed.     INTERVAL HPI/ OVERNIGHT EVENTS:   Feeling better.  Afebrile.    PAST MEDICAL & SURGICAL HISTORY:  Diabetes  Hypertension  Hypercholesteremia  Asthma  Benign prostatic hyperplasia  No significant past surgical history      For details regarding the patient's social history, family history, and other miscellaneous elements, please refer the initial infectious diseases consultation and/or the admitting history and physical examination for this admission.    ROS:  CONSTITUTIONAL:  Negative fever or chills  EYES:  Negative  blurry vision or double vision  CARDIOVASCULAR:  Negative for chest pain or palpitations  RESPIRATORY:  Negative for cough, wheezing, or SOB   GASTROINTESTINAL:  Negative for nausea, vomiting, diarrhea, constipation, or abdominal pain  GENITOURINARY:  Negative frequency, urgency or dysuria  NEUROLOGIC:  No headache, confusion, dizziness, lightheadedness  All other systems were reviewed and are negative         Current inpatient medications :    ANTIBIOTICS/RELEVANT:  piperacillin/tazobactam IVPB.. 3.375 Gram(s) IV Intermittent every 8 hours      acetaminophen     Tablet .. 650 milliGRAM(s) Oral every 6 hours PRN  acetaZOLAMIDE    Tablet 125 milliGRAM(s) Oral daily  albuterol    0.083% 2.5 milliGRAM(s) Nebulizer every 8 hours  aspirin  chewable 81 milliGRAM(s) Oral daily  dextrose 5%. 1000 milliLiter(s) IV Continuous <Continuous>  dextrose 5%. 1000 milliLiter(s) IV Continuous <Continuous>  dextrose 50% Injectable 25 Gram(s) IV Push once  dextrose 50% Injectable 12.5 Gram(s) IV Push once  dextrose 50% Injectable 25 Gram(s) IV Push once  dextrose Oral Gel 15 Gram(s) Oral once PRN  DULoxetine 30 milliGRAM(s) Oral daily  enoxaparin Injectable 40 milliGRAM(s) SubCutaneous every 24 hours  finasteride 5 milliGRAM(s) Oral daily  glucagon  Injectable 1 milliGRAM(s) IntraMuscular once  insulin lispro (ADMELOG) corrective regimen sliding scale   SubCutaneous three times a day before meals  insulin lispro (ADMELOG) corrective regimen sliding scale   SubCutaneous at bedtime  latanoprost 0.005% Ophthalmic Solution 1 Drop(s) Both EYES at bedtime  losartan 100 milliGRAM(s) Oral daily  melatonin 3 milliGRAM(s) Oral at bedtime PRN  montelukast 10 milliGRAM(s) Oral daily  ondansetron Injectable 4 milliGRAM(s) IV Push every 8 hours PRN  pantoprazole    Tablet 40 milliGRAM(s) Oral before breakfast  predniSONE   Tablet 20 milliGRAM(s) Oral daily  simvastatin 20 milliGRAM(s) Oral at bedtime  tamsulosin 0.4 milliGRAM(s) Oral at bedtime      Objective:     @ 07:01  -   @ 07:00  --------------------------------------------------------  IN: 200 mL / OUT: 0 mL / NET: 200 mL      T(C): 36.9 (23 @ 15:13), Max: 37.1 (23 @ 04:45)  HR: 78 (23 @ 15:24) (77 - 100)  BP: 157/84 (23 @ 15:13) (126/82 - 157/84)  RR: 16 (23 @ 15:13) (16 - 18)  SpO2: 95% (23 @ 15:24) (94% - 97%)      Physical Exam:  General: no acute distress  Neck: supple, trachea midline  Lungs: clear, no wheeze/rhonchi  Cardiovascular: regular rate and rhythm, S1 S2  Abdomen: soft, nontender,  bowel sounds normal  Neurological: alert and oriented x3  Skin: no rash  Extremities: no edema        LABS:                          13.2   13.49 )-----------( 259      ( 2023 07:00 )             39.1           133<L>  |  104  |  14  ----------------------------<  197<H>  3.7   |  24  |  1.00    Ca    8.4<L>      2023 07:00    TPro  6.4  /  Alb  3.3  /  TBili  0.6  /  DBili  x   /  AST  36  /  ALT  50  /  AlkPhos  64        PT/INR - ( 2023 05:25 )   PT: 11.1 sec;   INR: 0.95 ratio         PTT - ( 2023 05:25 )  PTT:28.1 sec  Urinalysis Basic - ( 2023 06:25 )    Color: Pale Yellow / Appearance: Clear / S.005 / pH: x  Gluc: x / Ketone: Trace  / Bili: Negative / Urobili: Negative   Blood: x / Protein: Negative / Nitrite: Negative   Leuk Esterase: Negative / RBC: x / WBC x   Sq Epi: x / Non Sq Epi: x / Bacteria: x    MICROBIOLOGY:    Culture - Blood (collected 2023 08:03)  Source: .Blood Blood  Preliminary Report (2023 12:02):    No growth to date.    Culture - Blood (collected 2023 07:53)  Source: .Blood Blood  Preliminary Report (2023 12:02):    No growth to date.      RADIOLOGY & ADDITIONAL STUDIES:    ACC: [id number] EXAM:  CT ABDOMEN AND PELVIS IC                        ACC: [id number] EXAM:  CT ANGIO CHEST PULM ART [facility]                          PROCEDURE DATE:  2023          INTERPRETATION:  Reason for Exam: Shortness of breath. chest pain.    CTA of the chest was performed from the thoracic inlet to the level of   the adrenal glands following IV contrast injection of  80 cc of Omnipaque   350. No immediate complications were reported.  MIP images were also   created and reviewed. CT ofthe abdomen and pelvis was also obtained.    Comparison: Chest x-ray of same date    Tubes/Lines: None    Mediastinum and Heart: Aorta and pulmonary arteries are normal in size.   Thyroid gland is unremarkable. No lymphadenopathy. No pericardial   effusion.    Lungs, Pleura, and Airways: There is no pulmonary embolus. No   consolidations, edema, effusion, or pneumothorax.    CT abdomen and pelvis:    The liver, spleen, pancreas and adrenals are unremarkable. Both kidneys   enhance symmetrically. No stones or hydronephrosis. Intraabdominal   vasculature is within normal limits. No lymphadenopathy.    Pelvic organs within normal limits. No evidence of bowel obstruction. The   appendix is not clearly seen. No acute bony abnormality.    IMPRESSION:    CTA CHEST: No pulmonary embolus.    CT abdomen and pelvis: No acute findings    Assessment :  83 y/o M w/ PMH of HTN, HLD, history of prior TIA ([facility]), type 2 diabetes mellitus, hydrocephalus, BPH, and asthma BIBA for R sided weakness and cough meets sepsis criteria, admitted for TIA and sepsis workup.  Tmax 101.4 yesterday No further fevers  WBC downtrending  CT CAP neg fo ractue pathology  MRI brain neg  Cultures NGTD  ?viral illness  Clinically better    Plan:   C/w zosyn for now  F/u pending infectious w/u  Trend temps/WBC  Asp precautions  Pulm toileting  Increase activity    Continue with present regiment.  Appropriate use of antibiotics and adverse effects reviewed.      > 35 minutes were spent in direct patient care reviewing notes, medications ,labs data/ imaging , discussion with multidisciplinary team.    Thank you for allowing me to participate in care of your patient .    Sarah Waggoner MD  Infectious Disease  [phone]
Neurology Follow up note    DARIELA [id number]    HPI:  Patient is an 81 y/o M w/ PMH of HTN, HLD, history of prior TIA (09/2022 adm to [facility]), Type 2 Diabetes Mellitus, hydrocephalus, BPH, and asthma BIBA for R sided weakness and cough. Patient Hebrew speaking, son translated. Per the son patient with non-productive cough x 3 days. Went to sleep at 9PM on 1/15, last known well. At 3 AM the patient was awake and coughing. The son checked on the patient who was noted to have difficulty arising from bed with some R sided weakness, experiencing a coughing fit with SOB, rigors and appeared to have had urinated and defecated on himself. Son endorsed patient had an inability to ambulate at that point. Of note this was the patient's first episode of incontinence. Patient w/ history of spinal injection for back pain ~ 1 week ago. Upon arrival to the ED the patient had an episode of emesis, O2 sats about 91-92%; improvement noted on NC. Concern for possible aspiration though per charting patient was sitting upright when he vomited, NBNB emesis. No sick contacts per son though patient does go to  M,W,F.    In the ED:  VS: T 97.7, , 138/73, RR 16, 95% on RA  S/p: Tylenol x 1, vanc + zosyn, 1 L NS bolus x 2  EKG: Sinus tach, rate 123, LAD, motion artifact  Labs: WBC 18; Lacte 2.8;     Imaging:  CXR: CXR clear  CT Head w/o: No evidence of acute intracranial hemorrhage, midline shift or CT evidence of acute territorial infarct. Mild ventriculomegaly without significant change from the prior MRI of the brain dated 9/26/2022.   CT PERFUSION: No core infarct or penumbra of ischemic tissue is identified by CT perfusion.  CT ANGIOGRAPHY NECK: Patent cervical vasculature. No hemodynamically significant carotid stenosis by NASCET criteria or flow-limiting vertebral artery stenosis.  No evidence of dissection.  CT ANGIOGRAPHY BRAIN: No vessel occlusion, flow-limiting stenosis or aneurysm. (16 Jan 2023 08:36)      Interval History -no complaints    Patient is seen, chart was reviewed and case was discussed with the treatment team.  Pt is not in any distress.   Lying on bed comfortably.   No events reported overnight.       Vital Signs Last 24 Hrs  T(C): 37 (18 Jan 2023 11:54), Max: 37 (18 Jan 2023 11:54)  T(F): 98.6 (18 Jan 2023 11:54), Max: 98.6 (18 Jan 2023 11:54)  HR: 80 (18 Jan 2023 11:54) (62 - 80)  BP: 157/77 (18 Jan 2023 11:54) (134/74 - 158/80)  BP(mean): --  RR: 18 (18 Jan 2023 11:54) (16 - 19)  SpO2: 98% (18 Jan 2023 11:54) (94% - 98%)    Parameters below as of 18 Jan 2023 11:54  Patient On (Oxygen Delivery Method): room air                REVIEW OF SYSTEMS:    Constitutional: No fever, weight loss or fatigue  Eyes: No eye pain, visual disturbances, or discharge  ENT:  No difficulty hearing, tinnitus, vertigo; No sinus or throat pain  Neck: No pain or stiffness  Respiratory: No , wheezing, chills or hemoptysis  Cardiovascular: No chest pain, palpitations, shortness of breath, dizziness or leg swelling  Gastrointestinal: No abdominal or epigastric pain. No nausea, vomiting or hematemesis; No diarrhea or constipation. No melena or hematochezia.  Genitourinary: No dysuria, frequency, hematuria or incontinence  Neurological: No headaches, memory loss, loss of strength, numbness or tremors  Psychiatric: No depression, anxiety, mood swings or difficulty sleeping  Musculoskeletal: No joint pain or swelling; No muscle, back or extremity pain  Skin: No itching, burning, rashes or lesions   Lymph Nodes: No enlarged glands  Endocrine: No heat or cold intolerance; No hair loss, No h/o diabetes or thyroid dysfunction  Allergy and Immunologic: No hives or eczema    On Neurological Examination:    Mental Status - Pt is alert, awake,  Follows commands well and able to answer questions appropriately.Mood and affect  normal    Speech -  Normal.    Cranial Nerves - Pupils 3 mm equal and reactive to light, extraocular eye movements intact. Pt has no visual field deficit.  Pt has no  facial asymmetry. Facial sensation is intact.Tongue - is in midline.    Muscle tone - is normal all over. Moves all extremities equally. No asymmetry is seen.      Motor Exam - 5/5 all over, No drift. No shaking or tremors.    Sensory Exam - Pt withdraws all extremities equally on stimulation. No asymmetry seen. No complaints of tingling, numbness.    Gait - Able to stand and walk unassisted.      coordination:    Finger to nose: normal      Deep tendon Reflexes - 2 plus all over.        Romberg - Negative.    Neck Supple -  Yes.     MEDICATIONS    acetaminophen     Tablet .. 650 milliGRAM(s) Oral every 6 hours PRN  acetaZOLAMIDE    Tablet 125 milliGRAM(s) Oral daily  albuterol    0.083% 2.5 milliGRAM(s) Nebulizer every 8 hours  aspirin  chewable 81 milliGRAM(s) Oral daily  dextrose 5%. 1000 milliLiter(s) IV Continuous <Continuous>  dextrose 5%. 1000 milliLiter(s) IV Continuous <Continuous>  dextrose 50% Injectable 25 Gram(s) IV Push once  dextrose 50% Injectable 12.5 Gram(s) IV Push once  dextrose 50% Injectable 25 Gram(s) IV Push once  dextrose Oral Gel 15 Gram(s) Oral once PRN  DULoxetine 30 milliGRAM(s) Oral daily  enoxaparin Injectable 40 milliGRAM(s) SubCutaneous every 24 hours  finasteride 5 milliGRAM(s) Oral daily  glucagon  Injectable 1 milliGRAM(s) IntraMuscular once  insulin lispro (ADMELOG) corrective regimen sliding scale   SubCutaneous three times a day before meals  insulin lispro (ADMELOG) corrective regimen sliding scale   SubCutaneous at bedtime  latanoprost 0.005% Ophthalmic Solution 1 Drop(s) Both EYES at bedtime  losartan 100 milliGRAM(s) Oral daily  melatonin 3 milliGRAM(s) Oral at bedtime PRN  montelukast 10 milliGRAM(s) Oral daily  ondansetron Injectable 4 milliGRAM(s) IV Push every 8 hours PRN  pantoprazole    Tablet 40 milliGRAM(s) Oral before breakfast  piperacillin/tazobactam IVPB.. 3.375 Gram(s) IV Intermittent every 8 hours  predniSONE   Tablet 20 milliGRAM(s) Oral daily  simvastatin 20 milliGRAM(s) Oral at bedtime  tamsulosin 0.4 milliGRAM(s) Oral at bedtime      Allergies    No Known Allergies    Intolerances                          12.6   10.40 )-----------( 264      ( 18 Jan 2023 06:13 )             37.8       Hemoglobin A1C:   Lipid Panel 01-17 @ 07:00  Total Cholesterol, Serum 121  LDL --  Triglycerides 99    Vitamin B12     RADIOLOGY  < from: MR Head No Cont (01.17.23 @ 13:42) >    ACC: [id number] EXAM:  MR BRAIN                          PROCEDURE DATE:  01/17/2023          INTERPRETATION:  Exam Date: 1/17/2023 1:42 PM    MR brain  without gadolinium    CLINICAL INFORMATION:  TIA    TECHNIQUE:   Sagittal and axial T1-weighted images, axial FLAIR images,   gradient echo, axial  T2-weighted images and axial diffusion weighted   images of the brain were obtained.    FINDINGS:   CT head of 1/16/2023 is available for review.    There is severe generalized cerebral volume loss, with a prominence in   the biparietal and temporal lobes. Enlargement of the ventricles,   particularly within the posterior horns, atria, and temporal horns of the   ventricles, likely proportional to degree of volume loss, however NPH   cannot be entirely excluded. There is no evidence of acute infarct,   hemorrhage, or mass lesion. There are scattered foci of FLAIR   hyperintensity in the periventricular and subcortical white matter of the   bilateral cerebri, compatible with mild chronic microvascular ischemic   changes. There is no midline shift or herniation pattern. No mass effect   is found in the brain.    The vertebral and internal carotid arteries demonstrate expected flow   voids indicating their patency.    The paranasal sinuses are clear.  Status post bilateral lens replacements.      IMPRESSION:   No acute infarct. There is severe generalized cerebral   volume loss, with a prominence in the biparietal and temporal lobes.   Enlargement of the ventricles, particularly within the posterior horns,   atria, and temporal horns of the ventricles, likely proportional to   degree of volume loss, however NPH cannot be entirely excluded. Mild   periventricular and subcortical white matter chronic microvascular   ischemic changes.      MARIMAR RUSSO MD; Attending Radiologist  This document has been electronically signed. Jan 17 2023  1:49PM    ASSESSMENT AND PLAN:    seen for right sided weakness  prob TIA      continue ap/statin  Physical therapy evaluation.  OOB to chair/ambulation with assistance only  Pain is accessed and addressed.  Would continue to follow.      
Patient seen and examined at bedside. [employer] used ID- [id number]. Patient AAOx2 on my exam. States he feels well, denies any chest pain, SOB, abd pain, weakness. States he would like to go home. Discussed with Neuro, no objection to discharge. Son updated via phone by Resident team, agreeable with d/c plan.    Physical Exam:  GENERAL: NAD, sitting on edge of bed comfortably  HEENT:  anicteric, moist mucous membranes  CHEST/LUNG:  CTA b/l, no rales, wheezes, or rhonchi  HEART:  tele monitor on, RRR, S1, S2  ABDOMEN:  BS+, soft, nontender, nondistended  EXTREMITIES: no edema, cyanosis, or calf tenderness  NERVOUS SYSTEM:  answers questions and follows commands appropriately, moving all 4 extremities spontaneously, motor Strength 5/5 in UE & LE B/L, AAOx2      Please refer to updated D/C note for further details.
seen for right sided weakness  tia; r/o cva  brain mri  sepsis w/u   antibiotic as per ID
Date/Time Patient Seen:  		  Referring MD:   Data Reviewed	       Patient is a 82y old  Male who presents with a chief complaint of TIA, sepsis (16 Jan 2023 16:17)      Subjective/HPI     PAST MEDICAL & SURGICAL HISTORY:  Diabetes    Hypertension    Hypercholesteremia    Asthma    Benign prostatic hyperplasia    No significant past surgical history          Medication list         MEDICATIONS  (STANDING):  acetaZOLAMIDE    Tablet 125 milliGRAM(s) Oral daily  albuterol    0.083% 2.5 milliGRAM(s) Nebulizer every 8 hours  aspirin  chewable 81 milliGRAM(s) Oral daily  dextrose 5%. 1000 milliLiter(s) (50 mL/Hr) IV Continuous <Continuous>  dextrose 5%. 1000 milliLiter(s) (100 mL/Hr) IV Continuous <Continuous>  dextrose 50% Injectable 25 Gram(s) IV Push once  dextrose 50% Injectable 12.5 Gram(s) IV Push once  dextrose 50% Injectable 25 Gram(s) IV Push once  DULoxetine 30 milliGRAM(s) Oral daily  enoxaparin Injectable 40 milliGRAM(s) SubCutaneous every 24 hours  finasteride 5 milliGRAM(s) Oral daily  glucagon  Injectable 1 milliGRAM(s) IntraMuscular once  insulin lispro (ADMELOG) corrective regimen sliding scale   SubCutaneous three times a day before meals  insulin lispro (ADMELOG) corrective regimen sliding scale   SubCutaneous at bedtime  latanoprost 0.005% Ophthalmic Solution 1 Drop(s) Both EYES at bedtime  losartan 100 milliGRAM(s) Oral daily  montelukast 10 milliGRAM(s) Oral daily  pantoprazole    Tablet 40 milliGRAM(s) Oral before breakfast  piperacillin/tazobactam IVPB.. 3.375 Gram(s) IV Intermittent every 8 hours  predniSONE   Tablet 20 milliGRAM(s) Oral daily  simvastatin 20 milliGRAM(s) Oral at bedtime  tamsulosin 0.4 milliGRAM(s) Oral at bedtime    MEDICATIONS  (PRN):  acetaminophen     Tablet .. 650 milliGRAM(s) Oral every 6 hours PRN Temp greater or equal to 38C (100.4F), Mild Pain (1 - 3)  dextrose Oral Gel 15 Gram(s) Oral once PRN Blood Glucose LESS THAN 70 milliGRAM(s)/deciliter  melatonin 3 milliGRAM(s) Oral at bedtime PRN Insomnia  ondansetron Injectable 4 milliGRAM(s) IV Push every 8 hours PRN Nausea and/or Vomiting         Vitals log        ICU Vital Signs Last 24 Hrs  T(C): 37.1 (17 Jan 2023 04:45), Max: 38.6 (16 Jan 2023 07:40)  T(F): 98.8 (17 Jan 2023 04:45), Max: 101.4 (16 Jan 2023 07:40)  HR: 83 (17 Jan 2023 04:45) (77 - 112)  BP: 127/72 (17 Jan 2023 04:45) (114/63 - 143/72)  BP(mean): --  ABP: --  ABP(mean): --  RR: 17 (17 Jan 2023 04:45) (16 - 18)  SpO2: 94% (17 Jan 2023 04:45) (94% - 100%)    O2 Parameters below as of 17 Jan 2023 04:45  Patient On (Oxygen Delivery Method): nasal cannula  O2 Flow (L/min): 2               Input and Output:  I&O's Detail    16 Jan 2023 07:01  -  17 Jan 2023 05:45  --------------------------------------------------------  IN:    Oral Fluid: 200 mL  Total IN: 200 mL    OUT:  Total OUT: 0 mL    Total NET: 200 mL          Lab Data                        14.4   18.05 )-----------( 268      ( 16 Jan 2023 05:25 )             43.0     01-16    135  |  105  |  16  ----------------------------<  225<H>  3.8   |  21<L>  |  1.10    Ca    8.8      16 Jan 2023 05:25    TPro  7.3  /  Alb  3.8  /  TBili  0.6  /  DBili  x   /  AST  29  /  ALT  50  /  AlkPhos  82  01-16            Review of Systems	      Objective     Physical Examination    heart s1s2  lung dec BS  head nc      Pertinent Lab findings & Imaging      [place]:  NO   Adequate UO     I&O's Detail    16 Jan 2023 07:01  -  17 Jan 2023 05:45  --------------------------------------------------------  IN:    Oral Fluid: 200 mL  Total IN: 200 mL    OUT:  Total OUT: 0 mL    Total NET: 200 mL               Discussed with:     Cultures:	        Radiology                            
Date/Time Patient Seen:  		  Referring MD:   Data Reviewed	       Patient is a 82y old  Male who presents with a chief complaint of TIA, sepsis (17 Jan 2023 17:26)      Subjective/HPI     PAST MEDICAL & SURGICAL HISTORY:  Diabetes    Hypertension    Hypercholesteremia    Asthma    Benign prostatic hyperplasia    No significant past surgical history          Medication list         MEDICATIONS  (STANDING):  acetaZOLAMIDE    Tablet 125 milliGRAM(s) Oral daily  albuterol    0.083% 2.5 milliGRAM(s) Nebulizer every 8 hours  aspirin  chewable 81 milliGRAM(s) Oral daily  dextrose 5%. 1000 milliLiter(s) (50 mL/Hr) IV Continuous <Continuous>  dextrose 5%. 1000 milliLiter(s) (100 mL/Hr) IV Continuous <Continuous>  dextrose 50% Injectable 25 Gram(s) IV Push once  dextrose 50% Injectable 12.5 Gram(s) IV Push once  dextrose 50% Injectable 25 Gram(s) IV Push once  DULoxetine 30 milliGRAM(s) Oral daily  enoxaparin Injectable 40 milliGRAM(s) SubCutaneous every 24 hours  finasteride 5 milliGRAM(s) Oral daily  glucagon  Injectable 1 milliGRAM(s) IntraMuscular once  insulin lispro (ADMELOG) corrective regimen sliding scale   SubCutaneous at bedtime  insulin lispro (ADMELOG) corrective regimen sliding scale   SubCutaneous three times a day before meals  latanoprost 0.005% Ophthalmic Solution 1 Drop(s) Both EYES at bedtime  losartan 100 milliGRAM(s) Oral daily  montelukast 10 milliGRAM(s) Oral daily  pantoprazole    Tablet 40 milliGRAM(s) Oral before breakfast  piperacillin/tazobactam IVPB.. 3.375 Gram(s) IV Intermittent every 8 hours  predniSONE   Tablet 20 milliGRAM(s) Oral daily  simvastatin 20 milliGRAM(s) Oral at bedtime  tamsulosin 0.4 milliGRAM(s) Oral at bedtime    MEDICATIONS  (PRN):  acetaminophen     Tablet .. 650 milliGRAM(s) Oral every 6 hours PRN Temp greater or equal to 38C (100.4F), Mild Pain (1 - 3)  dextrose Oral Gel 15 Gram(s) Oral once PRN Blood Glucose LESS THAN 70 milliGRAM(s)/deciliter  melatonin 3 milliGRAM(s) Oral at bedtime PRN Insomnia  ondansetron Injectable 4 milliGRAM(s) IV Push every 8 hours PRN Nausea and/or Vomiting         Vitals log        ICU Vital Signs Last 24 Hrs  T(C): 36.9 (18 Jan 2023 05:15), Max: 36.9 (17 Jan 2023 15:13)  T(F): 98.4 (18 Jan 2023 05:15), Max: 98.4 (17 Jan 2023 15:13)  HR: 66 (18 Jan 2023 05:15) (66 - 88)  BP: 134/74 (18 Jan 2023 05:15) (134/74 - 158/80)  BP(mean): --  ABP: --  ABP(mean): --  RR: 19 (18 Jan 2023 05:15) (16 - 19)  SpO2: 95% (18 Jan 2023 05:15) (94% - 98%)    O2 Parameters below as of 18 Jan 2023 05:15  Patient On (Oxygen Delivery Method): room air                 Input and Output:  I&O's Detail    17 Jan 2023 07:01  -  18 Jan 2023 07:00  --------------------------------------------------------  IN:  Total IN: 0 mL    OUT:    Voided (mL): 850 mL  Total OUT: 850 mL    Total NET: -850 mL          Lab Data                        13.2   13.49 )-----------( 259      ( 17 Jan 2023 07:00 )             39.1     01-17    133<L>  |  104  |  14  ----------------------------<  197<H>  3.7   |  24  |  1.00    Ca    8.4<L>      17 Jan 2023 07:00    TPro  6.4  /  Alb  3.3  /  TBili  0.6  /  DBili  x   /  AST  36  /  ALT  50  /  AlkPhos  64  01-17            Review of Systems	      Objective     Physical Examination    heart s1s2  lung dc BS  head nc      Pertinent Lab findings & Imaging      [place]:  NO   Adequate UO     I&O's Detail    17 Jan 2023 07:01  -  18 Jan 2023 07:00  --------------------------------------------------------  IN:  Total IN: 0 mL    OUT:    Voided (mL): 850 mL  Total OUT: 850 mL    Total NET: -850 mL               Discussed with:     Cultures:	        Radiology                            
Patient is a 82y old  Male who presents with a chief complaint of TIA, sepsis (2023 05:45)    INTERVAL HPI/OVERNIGHT EVENTS: No acute overnight events. Pt seen and examined at the bedside this AM. Pt was seen ambulating through hallways this AM. [employer]  # [id number] was utilized for assistance w/Bulgarian translation. Pt denies acute complaints. Per pt, doesn't know why he is on cymbalta - denies hx anxiety, depression, quitting smoking. Pt asking when he can go home.    MEDICATIONS  (STANDING):  acetaZOLAMIDE    Tablet 125 milliGRAM(s) Oral daily  albuterol    0.083% 2.5 milliGRAM(s) Nebulizer every 8 hours  aspirin  chewable 81 milliGRAM(s) Oral daily  dextrose 5%. 1000 milliLiter(s) (50 mL/Hr) IV Continuous <Continuous>  dextrose 5%. 1000 milliLiter(s) (100 mL/Hr) IV Continuous <Continuous>  dextrose 50% Injectable 25 Gram(s) IV Push once  dextrose 50% Injectable 12.5 Gram(s) IV Push once  dextrose 50% Injectable 25 Gram(s) IV Push once  DULoxetine 30 milliGRAM(s) Oral daily  enoxaparin Injectable 40 milliGRAM(s) SubCutaneous every 24 hours  finasteride 5 milliGRAM(s) Oral daily  glucagon  Injectable 1 milliGRAM(s) IntraMuscular once  insulin lispro (ADMELOG) corrective regimen sliding scale   SubCutaneous three times a day before meals  insulin lispro (ADMELOG) corrective regimen sliding scale   SubCutaneous at bedtime  latanoprost 0.005% Ophthalmic Solution 1 Drop(s) Both EYES at bedtime  losartan 100 milliGRAM(s) Oral daily  montelukast 10 milliGRAM(s) Oral daily  pantoprazole    Tablet 40 milliGRAM(s) Oral before breakfast  piperacillin/tazobactam IVPB.. 3.375 Gram(s) IV Intermittent every 8 hours  predniSONE   Tablet 20 milliGRAM(s) Oral daily  simvastatin 20 milliGRAM(s) Oral at bedtime  tamsulosin 0.4 milliGRAM(s) Oral at bedtime    MEDICATIONS  (PRN):  acetaminophen     Tablet .. 650 milliGRAM(s) Oral every 6 hours PRN Temp greater or equal to 38C (100.4F), Mild Pain (1 - 3)  dextrose Oral Gel 15 Gram(s) Oral once PRN Blood Glucose LESS THAN 70 milliGRAM(s)/deciliter  melatonin 3 milliGRAM(s) Oral at bedtime PRN Insomnia  ondansetron Injectable 4 milliGRAM(s) IV Push every 8 hours PRN Nausea and/or Vomiting      Allergies    No Known Allergies    Intolerances        REVIEW OF SYSTEMS:  CONSTITUTIONAL: No fever or chills, no pain  HEENT:  No headache, no sore throat  RESPIRATORY: No cough, wheezing, or shortness of breath  CARDIOVASCULAR: No chest pain, palpitations  GASTROINTESTINAL: No abd pain, nausea, vomiting, or diarrhea  GENITOURINARY: No dysuria, frequency, or hematuria  NEUROLOGICAL: no focal weakness or dizziness  MUSCULOSKELETAL: no myalgias     Vital Signs Last 24 Hrs  T(C): 37.1 (2023 04:45), Max: 38 (2023 14:30)  T(F): 98.8 (2023 04:45), Max: 100.4 (2023 14:30)  HR: 85 (2023 07:35) (77 - 105)  BP: 127/72 (2023 04:45) (126/82 - 143/72)  RR: 17 (2023 04:45) (16 - 18)  SpO2: 95% (2023 07:35) (94% - 98%)    Parameters below as of 2023 07:35  Patient On (Oxygen Delivery Method): room air        PHYSICAL EXAM:  GENERAL: NAD  HEENT:  anicteric, moist mucous membranes  CHEST/LUNG:  CTA b/l, no rales, wheezes, or rhonchi  HEART:  tele monitor on, RRR, S1, S2  ABDOMEN:  BS+, soft, nontender, nondistended  EXTREMITIES: no edema, cyanosis, or calf tenderness  NERVOUS SYSTEM: AAOX2 (to place and self, not time) answers questions and follows commands appropriately, moving all 4 extremities spontaneously, motor Strength 5/5 in UE & LE B/L  PSYCH: normal mood, affect and behavior    LABS:                        13.2   13.49 )-----------( 259      ( 2023 07:00 )             39.1     CBC Full  -  ( 2023 07:00 )  WBC Count : 13.49 K/uL  Hemoglobin : 13.2 g/dL  Hematocrit : 39.1 %  Platelet Count - Automated : 259 K/uL  Mean Cell Volume : 95.1 fl  Mean Cell Hemoglobin : 32.1 pg  Mean Cell Hemoglobin Concentration : 33.8 gm/dL  Auto Neutrophil # : 11.10 K/uL  Auto Lymphocyte # : 1.47 K/uL  Auto Monocyte # : 0.76 K/uL  Auto Eosinophil # : 0.05 K/uL  Auto Basophil # : 0.02 K/uL  Auto Neutrophil % : 82.3 %  Auto Lymphocyte % : 10.9 %  Auto Monocyte % : 5.6 %  Auto Eosinophil % : 0.4 %  Auto Basophil % : 0.1 %    2023 07:00    133    |  104    |  14     ----------------------------<  197    3.7     |  24     |  1.00     Ca    8.4        2023 07:00    TPro  6.4    /  Alb  3.3    /  TBili  0.6    /  DBili  x      /  AST  36     /  ALT  50     /  AlkPhos  64     2023 07:00    PT/INR - ( 2023 05:25 )   PT: 11.1 sec;   INR: 0.95 ratio         PTT - ( 2023 05:25 )  PTT:28.1 sec  Urinalysis Basic - ( 2023 06:25 )    Color: Pale Yellow / Appearance: Clear / S.005 / pH: x  Gluc: x / Ketone: Trace  / Bili: Negative / Urobili: Negative   Blood: x / Protein: Negative / Nitrite: Negative   Leuk Esterase: Negative / RBC: x / WBC x   Sq Epi: x / Non Sq Epi: x / Bacteria: x      CAPILLARY BLOOD GLUCOSE      POCT Blood Glucose.: 281 mg/dL (2023 12:26)  POCT Blood Glucose.: 230 mg/dL (2023 08:42)  POCT Blood Glucose.: 159 mg/dL (2023 21:28)  POCT Blood Glucose.: 153 mg/dL (2023 16:40)        Culture - Blood (collected 23 @ 08:03)  Source: .Blood Blood  Preliminary Report (23 @ 12:02):    No growth to date.    Culture - Blood (collected 23 @ 07:53)  Source: .Blood Blood  Preliminary Report (23 @ 12:02):    No growth to date.        RADIOLOGY & ADDITIONAL TESTS:  Personally reviewed.     Consultant(s) Notes Reviewed:  [x] YES  [ ] NO

## 2023-01-18 NOTE — PROGRESS NOTE ADULT - ASSESSMENT
83 y/o M w/ PMH of HTN, HLD, history of prior TIA (09/2022 adm to [facility]), Type 2 Diabetes Mellitus, hydrocephalus, BPH, and asthma BIBA for R sided weakness and cough.    sepsis eval  HTN  HLD  hx of TIA  DM  Hydrocephalus  BPH  Asthma  Cough - Dyspnea -     MRI cns noted - no acute cva  on ABX  SLP note reviewed  vs noted    on singulair for Asthma  will trial short course of Prednisone - NEBS -   emp ABX in progress  sepsis eval - cx - biomarkers  ct imaging shows no acute lung pathology  may benefit from outpatient PFT and Pulm Eval  cont cvs rx regimen  monitor VS and Sat  keep sat > 88 pct  spoke with SON
83 y/o M w/ PMH of HTN, HLD, history of prior TIA (09/2022 adm to [facility]), Type 2 Diabetes Mellitus, hydrocephalus, BPH, and asthma BIBA for R sided weakness and cough.    sepsis eval  HTN  HLD  hx of TIA  DM  Hydrocephalus  BPH  Asthma  Cough - Dyspnea -     vs noted  labs reviewed  VBG noted  on Diamox    on singulair for Asthma  will trial short course of Prednisone - NEBS -   emp ABX in progress  sepsis eval - cx - biomarkers  ct imaging shows no acute lung pathology  may benefit from outpatient PFT and Pulm Eval  cont cvs rx regimen  monitor VS and Sat  keep sat > 88 pct  spoke with SON  
81 y/o M w/ PMH of HTN, HLD, history of prior TIA (09/2022 adm to [facility]), type 2 diabetes mellitus, hydrocephalus, BPH, and asthma BIBA for R sided weakness and cough meets sepsis criteria, admitted for TIA and sepsis workup.

## 2023-01-18 NOTE — CARE COORDINATION ASSESSMENT. - ASSESSMENT CONCERNS TO BE ADDRESSED
PT/OT eval- no skilled needs or DME recommended. Discussed visiting nurse upon discharge-patient and son declined at this time. Patient's son stated he will drive the patient to his follow up appointments after discharge./care coordination

## 2023-01-18 NOTE — CAREGIVER ENGAGEMENT NOTE - CAREGIVER OUTREACH NOTES - FREE TEXT
Spoke to MD who stated the patient is medically cleared for transition home today. Patient and son declined home care services for a visiting nurse.  CM spoke to [facility] Phone/Fax # the same [phone] and advised that patient will be discharged home today. Discharge documentation faxed for CDPAP resumption. CM received a call from Ami VAZQUEZ at [facility] Transition team requesting discharge documentation-faxed as requested to fax # [phone]. Patient will follow up outpatient after discharge and son will drive the patient home. Patient and patient's son verbalized understanding of the discharge plan and are in agreement. CM remains available.

## 2023-01-18 NOTE — DISCHARGE NOTE NURSING/CASE MANAGEMENT/SOCIAL WORK - PATIENT PORTAL LINK FT
You can access the FollowMyHealth Patient Portal offered by [facility] by registering at the following website: [email or web address]. By joining [employer]’s FollowMyHealth portal, you will also be able to view your health information using other applications (apps) compatible with our system.

## 2023-01-18 NOTE — CARE COORDINATION ASSESSMENT. - OTHER PERTINENT DISCHARGE PLANNING INFORMATION:
CM met with the patient and his son Darius at the bedside and explained role of CM and transition planning. CM offered to use  as the patient is Romanian speaking. Patient's son Darius declined and stated he will translate. Patient lives in a private home with his son Darius and family. 10 stairs outside and 5 inside. Patient is independent with ADL's/ambulation PTA. Owns a cane and no other DME. Denies home care [facility] services PTA. Per patient's son Darius, his sister is the patient's CDPAP aide with [facility] [employer]. Darius assist with medication management and drives the patient to medical appointments. Darius stated the patient has a functioning glucometer and Darius checks the patient's blood sugar 4 times a week. Task sent to DM educator. PT/OT eval- no skilled PT needs or DME recommended. CM discussed a visiting nurse upon discharge - patient's son Darius declined service to be arranged and prefers outpatient follow up. CM provided direct contact information/resource folder.

## 2023-01-18 NOTE — PROGRESS NOTE ADULT - REASON FOR ADMISSION
TIA, sepsis

## 2023-01-18 NOTE — CARE COORDINATION ASSESSMENT. - NSPASTMEDSURGHISTORY_GEN_ALL_CORE_FT
PAST MEDICAL & SURGICAL HISTORY:  Benign prostatic hyperplasia      Asthma      Hypercholesteremia      Hypertension      Diabetes      No significant past surgical history

## 2023-01-21 LAB
CULTURE RESULTS: SIGNIFICANT CHANGE UP
SPECIMEN SOURCE: SIGNIFICANT CHANGE UP

## 2023-01-25 ENCOUNTER — APPOINTMENT (OUTPATIENT)
Dept: UROLOGY | Facility: CLINIC | Age: 83
End: 2023-01-25
Payer: MEDICARE

## 2023-01-25 VITALS
TEMPERATURE: 96.2 F | DIASTOLIC BLOOD PRESSURE: 76 MMHG | SYSTOLIC BLOOD PRESSURE: 137 MMHG | HEART RATE: 66 BPM | OXYGEN SATURATION: 99 % | RESPIRATION RATE: 16 BRPM

## 2023-01-25 DIAGNOSIS — Z87.898 PERSONAL HISTORY OF OTHER SPECIFIED CONDITIONS: ICD-10-CM

## 2023-01-25 DIAGNOSIS — G45.9 TRANSIENT CEREBRAL ISCHEMIC ATTACK, UNSPECIFIED: ICD-10-CM

## 2023-01-25 DIAGNOSIS — Z87.448 PERSONAL HISTORY OF OTHER DISEASES OF URINARY SYSTEM: ICD-10-CM

## 2023-01-25 PROCEDURE — 99213 OFFICE O/P EST LOW 20 MIN: CPT

## 2023-02-05 PROBLEM — Z87.898 HISTORY OF RIGHT FLANK PAIN: Status: RESOLVED | Noted: 2019-08-02 | Resolved: 2023-02-05

## 2023-02-05 PROBLEM — Z87.448 HISTORY OF GROSS HEMATURIA: Status: RESOLVED | Noted: 2019-07-26 | Resolved: 2023-02-05

## 2023-02-05 PROBLEM — G45.9 TIA (TRANSIENT ISCHEMIC ATTACK): Status: ACTIVE | Noted: 2023-02-05

## 2023-02-05 NOTE — HISTORY OF PRESENT ILLNESS
[FreeTextEntry1] : 77 yo Vietnamese speaking M \par 2 days ago, noticed some weak stream and then suddenly gross hematuria\par Went to ER yesterday and had catheter placed\par Prior to this states that he was voiding fine (every 2 hours)\par nocturia 2/night, weak stream and some straining\par no incontinence\par no prior history of gross hematuria\par Had one episode of urinary retention many years ago per pt\par On myrbetriq and tamsulosin for many years \par Normal bowel movements\par \par 8/16/19 Interval history: Had cysto at last visit which showed BPH.\par Pt was able to void after cysto \par Since then, pt states that he feels like he is voiding almost to completion but has been having some bothersome urinary spraying\par No recurrence of gross hematuria, no fever\par Still having some intermittent right flank pain. Renal US was negative for hydro or stones\par \par 9/20/19 Interval history: Has been doing well since last visit\par Only complaint is stream is diverted to the left and causes a mess everytime he urinates\par \par 1/10/20 Interval history: Ran out of BPH meds and went back to his previous urologist (Dr. Dumont) for refills\par Was told that he had a UTI and currently on Cipro\par Otherwise, feels like he is voiding well with no issues\par \par 7/10/20 Interval history: Doing well with no issues\par No gross hematuria, dysuria\par \par 1/15/21 Interval history: No issues since last visit\par No gross hematuria, dysuria\par Has been taking BPH meds consistently\par \par 6/9/21 Interval history: Two episodes of gross hematuria\par No flank pain\par No fever, chills\par \par 1/25/23 Interval history: Hasn't been seen since June 2021\par TIA x2 since last visit, most recently last week, discharged last Wed\par Stable LUTS\par Hematuria workup in June 2021 including CT and cysto was negative - no more gross hematuria

## 2023-02-05 NOTE — ASSESSMENT
[FreeTextEntry1] : 81 yo M with stable chronic BPH\par \par - PVR = 80ml\par - Continue dual medical therapy\par - FU in 1 year or as needed

## 2023-03-06 NOTE — DISCHARGE NOTE PROVIDER - NSDCQMSTROKE_NEU_ALL_CORE
pt who is 35 weeks gestation arrived to L&D today complaining of nausea, consistent cough, and occasional numbness in her legs. Pt was having irregular contractions and irritability, difficulty hydrating PO. IVF bolus given, uterine irritability noted, pain improved. Covid test negative. MD updated and pt given option to go home. Pt educated on PO hydration overnight, educated on when to return, and discharged home.   No Yes...

## 2023-06-25 NOTE — ED ADULT NURSE NOTE - HISTORY OF COVID-19 VACCINATION
Problem: Potential for Falls  Goal: Patient will remain free of falls  Description: INTERVENTIONS:  - Educate patient/family on patient safety including physical limitations  - Instruct patient to call for assistance with activity   - Consult OT/PT to assist with strengthening/mobility   - Keep Call bell within reach  - Keep bed low and locked with side rails adjusted as appropriate  - Keep care items and personal belongings within reach  - Initiate and maintain comfort rounds  - Make Fall Risk Sign visible to staff  - Apply yellow socks and bracelet for high fall risk patients  - Consider moving patient to room near nurses station  6/25/2023 0141 by Evangelist Romero RN  Outcome: Progressing  6/25/2023 0141 by Evangelist Romero RN  Outcome: Progressing     Problem: MOBILITY - ADULT  Goal: Maintain or return to baseline ADL function  Description: INTERVENTIONS:  -  Assess patient's ability to carry out ADLs; assess patient's baseline for ADL function and identify physical deficits which impact ability to perform ADLs (bathing, care of mouth/teeth, toileting, grooming, dressing, etc )  - Assess/evaluate cause of self-care deficits   - Assess range of motion  - Assess patient's mobility; develop plan if impaired  - Assess patient's need for assistive devices and provide as appropriate  - Encourage maximum independence but intervene and supervise when necessary  - Involve family in performance of ADLs  - Assess for home care needs following discharge   - Consider OT consult to assist with ADL evaluation and planning for discharge  - Provide patient education as appropriate  6/25/2023 0141 by Evangelist Romero RN  Outcome: Not Progressing  6/25/2023 0141 by Evangelist Romero RN  Outcome: Progressing  Goal: Maintains/Returns to pre admission functional level  Description: INTERVENTIONS:  - Perform BMAT or MOVE assessment daily    - Set and communicate daily mobility goal to care team and patient/family/caregiver     - Collaborate with rehabilitation services on mobility goals if consulted  - Out of bed for toileting  - Record patient progress and toleration of activity level   6/25/2023 0141 by Milton Adhikari RN  Outcome: Not Progressing  6/25/2023 0141 by Milton Adhikari RN  Outcome: Progressing     Problem: Prexisting or High Potential for Compromised Skin Integrity  Goal: Skin integrity is maintained or improved  Description: INTERVENTIONS:  - Identify patients at risk for skin breakdown  - Assess and monitor skin integrity  - Assess and monitor nutrition and hydration status  - Monitor labs   - Assess for incontinence   - Turn and reposition patient  - Assist with mobility/ambulation  - Relieve pressure over bony prominences  - Avoid friction and shearing  - Provide appropriate hygiene as needed including keeping skin clean and dry  - Evaluate need for skin moisturizer/barrier cream  - Collaborate with interdisciplinary team   - Patient/family teaching  - Consider wound care consult   6/25/2023 0141 by Milton Adhikari RN  Outcome: Progressing  6/25/2023 0141 by Milton Adhikari RN  Outcome: Progressing     Problem: Nutrition/Hydration-ADULT  Goal: Nutrient/Hydration intake appropriate for improving, restoring or maintaining nutritional needs  Description: Monitor and assess patient's nutrition/hydration status for malnutrition  Collaborate with interdisciplinary team and initiate plan and interventions as ordered  Monitor patient's weight and dietary intake as ordered or per policy  Utilize nutrition screening tool and intervene as necessary  Determine patient's food preferences and provide high-protein, high-caloric foods as appropriate       INTERVENTIONS:  - Monitor oral intake, urinary output, labs, and treatment plans  - Assess nutrition and hydration status and recommend course of action  - Evaluate amount of meals eaten  - Assist patient with eating if necessary   - Allow adequate time for meals  - Recommend/ encourage appropriate diets, oral nutritional supplements, and vitamin/mineral supplements  - Order, calculate, and assess calorie counts as needed  - Recommend, monitor, and adjust tube feedings and TPN/PPN based on assessed needs  - Assess need for intravenous fluids  - Provide specific nutrition/hydration education as appropriate  - Include patient/family/caregiver in decisions related to nutrition  6/25/2023 0141 by Laurie Smith RN  Outcome: Progressing  6/25/2023 0141 by Laurie Smith RN  Outcome: Progressing Yes

## 2023-07-10 NOTE — ED ADULT NURSE NOTE - CHIEF COMPLAINT QUOTE
Yes " He was here last night because he had a lot pain on he callaway catheter - It was changed last night and was given pain medication. He is dizzy and he vomited several times today "

## 2023-07-19 ENCOUNTER — EMERGENCY (EMERGENCY)
Facility: HOSPITAL | Age: 83
LOS: 1 days | Discharge: HOME | End: 2023-07-19
Attending: EMERGENCY MEDICINE | Admitting: EMERGENCY MEDICINE
Payer: MEDICARE

## 2023-07-19 VITALS
RESPIRATION RATE: 18 BRPM | WEIGHT: 175.05 LBS | HEIGHT: 67 IN | TEMPERATURE: 100 F | DIASTOLIC BLOOD PRESSURE: 80 MMHG | OXYGEN SATURATION: 96 % | HEART RATE: 88 BPM | SYSTOLIC BLOOD PRESSURE: 160 MMHG

## 2023-07-19 VITALS
OXYGEN SATURATION: 96 % | SYSTOLIC BLOOD PRESSURE: 157 MMHG | TEMPERATURE: 99 F | RESPIRATION RATE: 18 BRPM | DIASTOLIC BLOOD PRESSURE: 81 MMHG | HEART RATE: 80 BPM

## 2023-07-19 LAB
ALBUMIN SERPL ELPH-MCNC: 3.7 G/DL — SIGNIFICANT CHANGE UP (ref 3.3–5)
ALP SERPL-CCNC: 94 U/L — SIGNIFICANT CHANGE UP (ref 40–120)
ALT FLD-CCNC: 42 U/L — SIGNIFICANT CHANGE UP (ref 12–78)
ANION GAP SERPL CALC-SCNC: 5 MMOL/L — SIGNIFICANT CHANGE UP (ref 5–17)
APTT BLD: 31.8 SEC — SIGNIFICANT CHANGE UP (ref 27.5–35.5)
AST SERPL-CCNC: 20 U/L — SIGNIFICANT CHANGE UP (ref 15–37)
BASOPHILS # BLD AUTO: 0.05 K/UL — SIGNIFICANT CHANGE UP (ref 0–0.2)
BASOPHILS NFR BLD AUTO: 0.6 % — SIGNIFICANT CHANGE UP (ref 0–2)
BILIRUB SERPL-MCNC: 0.6 MG/DL — SIGNIFICANT CHANGE UP (ref 0.2–1.2)
BUN SERPL-MCNC: 13 MG/DL — SIGNIFICANT CHANGE UP (ref 7–23)
CALCIUM SERPL-MCNC: 8.8 MG/DL — SIGNIFICANT CHANGE UP (ref 8.5–10.1)
CHLORIDE SERPL-SCNC: 104 MMOL/L — SIGNIFICANT CHANGE UP (ref 96–108)
CO2 SERPL-SCNC: 23 MMOL/L — SIGNIFICANT CHANGE UP (ref 22–31)
CREAT SERPL-MCNC: 1.1 MG/DL — SIGNIFICANT CHANGE UP (ref 0.5–1.3)
EGFR: 66 ML/MIN/1.73M2 — SIGNIFICANT CHANGE UP
EOSINOPHIL # BLD AUTO: 0.05 K/UL — SIGNIFICANT CHANGE UP (ref 0–0.5)
EOSINOPHIL NFR BLD AUTO: 0.6 % — SIGNIFICANT CHANGE UP (ref 0–6)
GLUCOSE SERPL-MCNC: 391 MG/DL — HIGH (ref 70–99)
HCT VFR BLD CALC: 41.2 % — SIGNIFICANT CHANGE UP (ref 39–50)
HGB BLD-MCNC: 13.9 G/DL — SIGNIFICANT CHANGE UP (ref 13–17)
IMM GRANULOCYTES NFR BLD AUTO: 1.2 % — HIGH (ref 0–0.9)
INR BLD: 0.99 RATIO — SIGNIFICANT CHANGE UP (ref 0.88–1.16)
LYMPHOCYTES # BLD AUTO: 1.63 K/UL — SIGNIFICANT CHANGE UP (ref 1–3.3)
LYMPHOCYTES # BLD AUTO: 18.3 % — SIGNIFICANT CHANGE UP (ref 13–44)
MCHC RBC-ENTMCNC: 31.7 PG — SIGNIFICANT CHANGE UP (ref 27–34)
MCHC RBC-ENTMCNC: 33.7 GM/DL — SIGNIFICANT CHANGE UP (ref 32–36)
MCV RBC AUTO: 94.1 FL — SIGNIFICANT CHANGE UP (ref 80–100)
MONOCYTES # BLD AUTO: 1.18 K/UL — HIGH (ref 0–0.9)
MONOCYTES NFR BLD AUTO: 13.2 % — SIGNIFICANT CHANGE UP (ref 2–14)
NEUTROPHILS # BLD AUTO: 5.89 K/UL — SIGNIFICANT CHANGE UP (ref 1.8–7.4)
NEUTROPHILS NFR BLD AUTO: 66.1 % — SIGNIFICANT CHANGE UP (ref 43–77)
NRBC # BLD: 0 /100 WBCS — SIGNIFICANT CHANGE UP (ref 0–0)
NT-PROBNP SERPL-SCNC: 60 PG/ML — SIGNIFICANT CHANGE UP (ref 0–450)
PLATELET # BLD AUTO: 243 K/UL — SIGNIFICANT CHANGE UP (ref 150–400)
POTASSIUM SERPL-MCNC: 4.2 MMOL/L — SIGNIFICANT CHANGE UP (ref 3.5–5.3)
POTASSIUM SERPL-SCNC: 4.2 MMOL/L — SIGNIFICANT CHANGE UP (ref 3.5–5.3)
PROT SERPL-MCNC: 7.4 G/DL — SIGNIFICANT CHANGE UP (ref 6–8.3)
PROTHROM AB SERPL-ACNC: 11.6 SEC — SIGNIFICANT CHANGE UP (ref 10.5–13.4)
RBC # BLD: 4.38 M/UL — SIGNIFICANT CHANGE UP (ref 4.2–5.8)
RBC # FLD: 12.9 % — SIGNIFICANT CHANGE UP (ref 10.3–14.5)
SODIUM SERPL-SCNC: 132 MMOL/L — LOW (ref 135–145)
TROPONIN I, HIGH SENSITIVITY RESULT: 7.1 NG/L — SIGNIFICANT CHANGE UP
WBC # BLD: 8.91 K/UL — SIGNIFICANT CHANGE UP (ref 3.8–10.5)
WBC # FLD AUTO: 8.91 K/UL — SIGNIFICANT CHANGE UP (ref 3.8–10.5)

## 2023-07-19 PROCEDURE — 71275 CT ANGIOGRAPHY CHEST: CPT | Mod: MA

## 2023-07-19 PROCEDURE — 84484 ASSAY OF TROPONIN QUANT: CPT

## 2023-07-19 PROCEDURE — 71045 X-RAY EXAM CHEST 1 VIEW: CPT | Mod: 26

## 2023-07-19 PROCEDURE — 80053 COMPREHEN METABOLIC PANEL: CPT

## 2023-07-19 PROCEDURE — 36415 COLL VENOUS BLD VENIPUNCTURE: CPT

## 2023-07-19 PROCEDURE — 71275 CT ANGIOGRAPHY CHEST: CPT | Mod: 26,MA

## 2023-07-19 PROCEDURE — 99285 EMERGENCY DEPT VISIT HI MDM: CPT

## 2023-07-19 PROCEDURE — 83880 ASSAY OF NATRIURETIC PEPTIDE: CPT

## 2023-07-19 PROCEDURE — 99285 EMERGENCY DEPT VISIT HI MDM: CPT | Mod: 25

## 2023-07-19 PROCEDURE — 85730 THROMBOPLASTIN TIME PARTIAL: CPT

## 2023-07-19 PROCEDURE — 93005 ELECTROCARDIOGRAM TRACING: CPT

## 2023-07-19 PROCEDURE — 85610 PROTHROMBIN TIME: CPT

## 2023-07-19 PROCEDURE — 85025 COMPLETE CBC W/AUTO DIFF WBC: CPT

## 2023-07-19 PROCEDURE — 71045 X-RAY EXAM CHEST 1 VIEW: CPT

## 2023-07-19 PROCEDURE — 93010 ELECTROCARDIOGRAM REPORT: CPT

## 2023-07-19 RX ORDER — SODIUM CHLORIDE 9 MG/ML
1000 INJECTION INTRAMUSCULAR; INTRAVENOUS; SUBCUTANEOUS ONCE
Refills: 0 | Status: COMPLETED | OUTPATIENT
Start: 2023-07-19 | End: 2023-07-19

## 2023-07-19 RX ADMIN — SODIUM CHLORIDE 1000 MILLILITER(S): 9 INJECTION INTRAMUSCULAR; INTRAVENOUS; SUBCUTANEOUS at 19:01

## 2023-07-19 NOTE — ED PROVIDER NOTE - NSFOLLOWUPINSTRUCTIONS_ED_ALL_ED_FT
Follow up with your primary care and pulmonology. Return for chest pain, shortness of breath, worsening condition.     COVID-19      COVID-19, or coronavirus disease 2019, is an infection that is caused by a new (novel) coronavirus called SARS-CoV-2. COVID-19 can cause many symptoms. In some people, the virus may not cause any symptoms. In others, it may cause mild or severe symptoms. Some people with severe infection develop severe disease.    What are the causes?  The human body, showing how the coronavirus travels from the air to a person's lungs.  This illness is caused by a virus. The virus may be in the air as tiny specks of fluid (aerosols) or droplets, or it may be on surfaces. You may catch the virus by:  Breathing in droplets from an infected person. Droplets can be spread by a person breathing, speaking, singing, coughing, or sneezing.  Touching something, like a table or a doorknob, that has virus on it (is contaminated) and then touching your mouth, nose, or eyes.  What increases the risk?  Risk for infection:    You are more likely to get infected with the COVID-19 virus if:  You are within 6 ft (1.8 m) of a person with COVID-19 for 15 minutes or longer.  You are providing care for a person who is infected with COVID-19.  You are in close personal contact with other people. Close personal contact includes hugging, kissing, or sharing eating or drinking utensils.  Risk for serious illness caused by COVID-19:    You are more likely to get seriously ill from the COVID-19 virus if:  You have cancer.  You have a long-term (chronic) disease, such as:  Chronic lung disease. This includes pulmonary embolism, chronic obstructive pulmonary disease, and cystic fibrosis.  Long-term disease that lowers your body's ability to fight infection (immunocompromise).  Serious cardiac conditions, such as heart failure, coronary artery disease, or cardiomyopathy.  Diabetes.  Chronic kidney disease.  Liver diseases. These include cirrhosis, nonalcoholic fatty liver disease, alcoholic liver disease, or autoimmune hepatitis.  You have obesity.  You are pregnant or were recently pregnant.  You have sickle cell disease.  What are the signs or symptoms?  Symptoms of this condition can range from mild to severe. Symptoms may appear any time from 2 to 14 days after being exposed to the virus. They include:  Fever or chills.  Shortness of breath or trouble breathing.  Feeling tired or very tired.  Headaches, body aches, or muscle aches.  Runny or stuffy nose, sneezing, coughing, or sore throat.  New loss of taste or smell. This is rare.  Some people may also have stomach problems, such as nausea, vomiting, or diarrhea.    Other people may not have any symptoms of COVID-19.    How is this diagnosed?  A sample being collected by swabbing the nose.  This condition may be diagnosed by testing samples to check for the COVID-19 virus. The most common tests are the PCR test and the antigen test. Tests may be done in the lab or at home. They include:  Using a swab to take a sample of fluid from the back of your nose and throat (nasopharyngeal fluid), from your nose, or from your throat.  Testing a sample of saliva from your mouth.  Testing a sample of coughed-up mucus from your lungs (sputum).  How is this treated?  Treatment for COVID-19 infection depends on the severity of the condition.  Mild symptoms can be managed at home with rest, fluids, and over-the-counter medicines.  Serious symptoms may be treated in a hospital intensive care unit (ICU). Treatment in the ICU may include:  Supplemental oxygen. Extra oxygen is given through a tube in the nose, a face mask, or a greenwood.  Medicines. These may include:  Antivirals, such as monoclonal antibodies. These help your body fight off certain viruses that can cause disease.  Anti-inflammatories, such as corticosteroids. These reduce inflammation and suppress the immune system.  Antithrombotics. These prevent or treat blood clots, if they develop.  Convalescent plasma. This helps boost your immune system, if you have an underlying immunosuppressive condition or are getting immunosuppressive treatments.  Prone positioning. This means you will lie on your stomach. This helps oxygen to get into your lungs.  Infection control measures.  If you are at risk for more serious illness caused by COVID-19, your health care provider may prescribe two long-acting monoclonal antibodies, given together every 6 months.    How is this prevented?  To protect yourself:    Use preventive medicine (pre-exposure prophylaxis). You may get pre-exposure prophylaxis if you have moderate or severe immunocompromise.  Get vaccinated. Anyone 6 months old or older who meets guidelines can get a COVID-19 vaccine or vaccine series. This includes people who are pregnant or making breast milk (lactating).  Get an added dose of COVID-19 vaccine after your first vaccine or vaccine series if you have moderate to severe immunocompromise. This applies if you have had a solid organ transplant or have been diagnosed with an immunocompromising condition.  You should get the added dose 4 weeks after you got the first COVID-19 vaccine or vaccine series.  If you get an mRNA vaccine, you will need a 3-dose primary series.  If you get the J&J/Devon vaccine, you will need a 2-dose primary series, with the second dose being an mRNA vaccine.  Talk to your health care provider about getting experimental monoclonal antibodies. This treatment is approved under emergency use authorization to prevent severe illness before or after being exposed to the COVID-19 virus. You may be given monoclonal antibodies if:  You have moderate or severe immunocompromise. This includes treatments that lower your immune response. People with immunocompromise may not develop protection against COVID-19 when they are vaccinated.  You cannot be vaccinated. You may not get a vaccine if you have a severe allergic reaction to the vaccine or its components.  You are not fully vaccinated.  You are in a facility where COVID-19 is present and:  Are in close contact with a person who is infected with the COVID-19 virus.  Are at high risk of being exposed to the COVID-19 virus.  You are at risk of illness from new variants of the COVID-19 virus.  To protect others:    If you have symptoms of COVID-19, take steps to prevent the virus from spreading to others.  Stay home. Leave your house only to get medical care. Do not use public transit, if possible.  Do not travel while you are sick.  Wash your hands often with soap and water for at least 20 seconds. If soap and water are not available, use alcohol-based hand .  Make sure that all people in your household wash their hands well and often.  Cough or sneeze into a tissue or your sleeve or elbow. Do not cough or sneeze into your hand or into the air.  Where to find more information  Centers for Disease Control and Prevention: www.cdc.gov/coronavirus  World Health Organization: www.who.int/health-topics/coronavirus  Get help right away if:  You have trouble breathing.  You have pain or pressure in your chest.  You are confused.  You have bluish lips and fingernails.  You have trouble waking from sleep.  You have symptoms that get worse.  These symptoms may be an emergency. Get help right away. Call 911.  Do not wait to see if the symptoms will go away.  Do not drive yourself to the hospital.  Summary  COVID-19 is an infection that is caused by a new coronavirus.  Sometimes, there are no symptoms. Other times, symptoms range from mild to severe. Some people with a severe COVID-19 infection develop severe disease.  The virus that causes COVID-19 can spread from person to person through droplets or aerosols from breathing, speaking, singing, coughing, or sneezing.  Mild symptoms of COVID-19 can be managed at home with rest, fluids, and over-the-counter medicines.  This information is not intended to replace advice given to you by your health care provider. Make sure you discuss any questions you have with your health care provider.

## 2023-07-19 NOTE — ED PROVIDER NOTE - CLINICAL SUMMARY MEDICAL DECISION MAKING FREE TEXT BOX
82-year-old male with significant past medical history for BPH, asthma, hyperlipidemia, hypertension, diabetes presents to the ED from the urgent care with complaints of shortness of breath with a positive COVID test.  Patient also complaining of chest pain chest tightness.  Cardiac labs rule out ACS, labs rule out electrolyte abnormalities, x-ray rule out pneumonia.

## 2023-07-19 NOTE — ED ADULT NURSE NOTE - OBJECTIVE STATEMENT
Pt's son states that pt was diagnosed with COVID today at the urgent care. Pt's son states that pt's Cough and SOB started yesterday. Pt in no acute distress. Pt and pt's son updated on plan of care

## 2023-07-19 NOTE — ED PROVIDER NOTE - CARE PROVIDER_API CALL
Kike Starks  Pulmonary Disease  [address] [place] [address]  Phone: [phone]  Fax: [phone]  Follow Up Time: 1-3 Days

## 2023-07-19 NOTE — ED PROVIDER NOTE - PATIENT PORTAL LINK FT
You can access the FollowMyHealth Patient Portal offered by [facility] by registering at the following website: [email or web address]. By joining [facility]’s FollowMyHealth portal, you will also be able to view your health information using other applications (apps) compatible with our system.

## 2023-07-19 NOTE — ED ADULT NURSE NOTE - NSFALLUNIVINTERV_ED_ALL_ED
Bed/Stretcher in lowest position, wheels locked, appropriate side rails in place/Call bell, personal items and telephone in reach/Instruct patient to call for assistance before getting out of bed/chair/stretcher/Non-slip footwear applied when patient is off stretcher/[place] to call system/Physically safe environment - no spills, clutter or unnecessary equipment/Purposeful proactive rounding/Room/bathroom lighting operational, light cord in reach

## 2023-07-19 NOTE — ED PROVIDER NOTE - OBJECTIVE STATEMENT
82-year-old male with past medical history of hypertension, hyperlipidemia, diabetes presents today from urgent care due to COVID.  Patient experienced chest pain 2 days ago in which she followed up with a cardiologist and is scheduled for further testing.  Patient has been experiencing a cough and low-grade fever.  Patient tested positive for COVID at the urgent care and was advised to come to the ER due to concerns for pneumonia.  Patient had a chest x-ray which was negative but the doctor at the facility advised it sounded like he had pneumonia.  Patient denies vomiting, diarrhea, chest pain currently, shortness of breath, hemoptysis, or any other complaints.

## 2023-07-19 NOTE — ED PROVIDER NOTE - PHYSICAL EXAMINATION
Constitutional: Awake, Alert, non-toxic. NAD. Well appearing, well nourished.   HEAD: Normocephalic, atraumatic.   EYES: EOM intact, conjunctiva and sclera are clear bilaterally.   ENT: No rhinorrhea, patent, mucous membranes pink/moist, no drooling or stridor.   NECK: Supple, non-tender  CARDIOVASCULAR: Normal S1, S2; regular rate and rhythm.  RESPIRATORY: Normal respiratory effort; breath sounds CTAB, (+) B/L rales, no wheezes or rhonchi. Speaking in full sentences. No accessory muscle use.   ABDOMEN: Soft; non-tender, non-distended.   EXTREMITIES: Full passive and active ROM in all extremities; non-tender to palpation; distal pulses palpable and symmetric  SKIN: Warm, dry; good skin turgor, no apparent lesions or rashes, no ecchymosis, brisk capillary refill.  NEURO: A&O x3. Sensory and motor functions are grossly intact. Speech is normal. Appearance and judgement seem appropriate for gender and age.

## 2023-07-19 NOTE — ED ADULT NURSE REASSESSMENT NOTE - NSFALLASSESSNEED_ED_ALL_ED
no Detail Level: Detailed Quality 110: Preventive Care And Screening: Influenza Immunization: Influenza Immunization previously received during influenza season Quality 111:Pneumonia Vaccination Status For Older Adults: Pneumococcal Vaccination Previously Received

## 2023-07-19 NOTE — ED PROVIDER NOTE - NS ED ATTENDING STATEMENT MOD
This was a shared visit with the MIKEL. I reviewed and verified the documentation and independently performed the documented:

## 2023-07-19 NOTE — ED ADULT NURSE REASSESSMENT NOTE - NS ED NURSE REASSESS COMMENT FT1
Received Pt a/ox4 on isolation, resting in stretcher, SOB/NORRIS,  no other signs of acute distress noted.

## 2023-08-03 NOTE — ED ADULT NURSE NOTE - CAS ED AMA FORM SIGNED YN
Received fax from [phone] regarding prednisone 20 mg tab two tabs daily. Okay to send in refill? Yes

## 2023-11-24 ENCOUNTER — EMERGENCY (EMERGENCY)
Facility: HOSPITAL | Age: 83
LOS: 1 days | Discharge: HOME | End: 2023-11-24
Attending: EMERGENCY MEDICINE | Admitting: EMERGENCY MEDICINE
Payer: MEDICARE

## 2023-11-24 VITALS
SYSTOLIC BLOOD PRESSURE: 148 MMHG | HEART RATE: 59 BPM | TEMPERATURE: 98 F | RESPIRATION RATE: 16 BRPM | OXYGEN SATURATION: 98 % | DIASTOLIC BLOOD PRESSURE: 78 MMHG

## 2023-11-24 VITALS
TEMPERATURE: 98 F | HEART RATE: 78 BPM | WEIGHT: 149.91 LBS | OXYGEN SATURATION: 96 % | HEIGHT: 67 IN | SYSTOLIC BLOOD PRESSURE: 111 MMHG | RESPIRATION RATE: 14 BRPM | DIASTOLIC BLOOD PRESSURE: 63 MMHG

## 2023-11-24 LAB
ALBUMIN SERPL ELPH-MCNC: 3.4 G/DL — SIGNIFICANT CHANGE UP (ref 3.3–5)
ALBUMIN SERPL ELPH-MCNC: 3.4 G/DL — SIGNIFICANT CHANGE UP (ref 3.3–5)
ALP SERPL-CCNC: 111 U/L — SIGNIFICANT CHANGE UP (ref 40–120)
ALP SERPL-CCNC: 111 U/L — SIGNIFICANT CHANGE UP (ref 40–120)
ALT FLD-CCNC: 35 U/L — SIGNIFICANT CHANGE UP (ref 12–78)
ALT FLD-CCNC: 35 U/L — SIGNIFICANT CHANGE UP (ref 12–78)
ANION GAP SERPL CALC-SCNC: 8 MMOL/L — SIGNIFICANT CHANGE UP (ref 5–17)
ANION GAP SERPL CALC-SCNC: 8 MMOL/L — SIGNIFICANT CHANGE UP (ref 5–17)
AST SERPL-CCNC: 23 U/L — SIGNIFICANT CHANGE UP (ref 15–37)
AST SERPL-CCNC: 23 U/L — SIGNIFICANT CHANGE UP (ref 15–37)
BASOPHILS # BLD AUTO: 0.03 K/UL — SIGNIFICANT CHANGE UP (ref 0–0.2)
BASOPHILS # BLD AUTO: 0.03 K/UL — SIGNIFICANT CHANGE UP (ref 0–0.2)
BASOPHILS NFR BLD AUTO: 0.4 % — SIGNIFICANT CHANGE UP (ref 0–2)
BASOPHILS NFR BLD AUTO: 0.4 % — SIGNIFICANT CHANGE UP (ref 0–2)
BILIRUB SERPL-MCNC: 0.5 MG/DL — SIGNIFICANT CHANGE UP (ref 0.2–1.2)
BILIRUB SERPL-MCNC: 0.5 MG/DL — SIGNIFICANT CHANGE UP (ref 0.2–1.2)
BUN SERPL-MCNC: 13 MG/DL — SIGNIFICANT CHANGE UP (ref 7–23)
BUN SERPL-MCNC: 13 MG/DL — SIGNIFICANT CHANGE UP (ref 7–23)
CALCIUM SERPL-MCNC: 8.5 MG/DL — SIGNIFICANT CHANGE UP (ref 8.5–10.1)
CALCIUM SERPL-MCNC: 8.5 MG/DL — SIGNIFICANT CHANGE UP (ref 8.5–10.1)
CHLORIDE SERPL-SCNC: 102 MMOL/L — SIGNIFICANT CHANGE UP (ref 96–108)
CHLORIDE SERPL-SCNC: 102 MMOL/L — SIGNIFICANT CHANGE UP (ref 96–108)
CO2 SERPL-SCNC: 23 MMOL/L — SIGNIFICANT CHANGE UP (ref 22–31)
CO2 SERPL-SCNC: 23 MMOL/L — SIGNIFICANT CHANGE UP (ref 22–31)
CREAT SERPL-MCNC: 1.1 MG/DL — SIGNIFICANT CHANGE UP (ref 0.5–1.3)
CREAT SERPL-MCNC: 1.1 MG/DL — SIGNIFICANT CHANGE UP (ref 0.5–1.3)
EGFR: 66 ML/MIN/1.73M2 — SIGNIFICANT CHANGE UP
EGFR: 66 ML/MIN/1.73M2 — SIGNIFICANT CHANGE UP
EOSINOPHIL # BLD AUTO: 0.08 K/UL — SIGNIFICANT CHANGE UP (ref 0–0.5)
EOSINOPHIL # BLD AUTO: 0.08 K/UL — SIGNIFICANT CHANGE UP (ref 0–0.5)
EOSINOPHIL NFR BLD AUTO: 1 % — SIGNIFICANT CHANGE UP (ref 0–6)
EOSINOPHIL NFR BLD AUTO: 1 % — SIGNIFICANT CHANGE UP (ref 0–6)
GLUCOSE SERPL-MCNC: 443 MG/DL — HIGH (ref 70–99)
GLUCOSE SERPL-MCNC: 443 MG/DL — HIGH (ref 70–99)
HCT VFR BLD CALC: 36.8 % — LOW (ref 39–50)
HCT VFR BLD CALC: 36.8 % — LOW (ref 39–50)
HGB BLD-MCNC: 12.5 G/DL — LOW (ref 13–17)
HGB BLD-MCNC: 12.5 G/DL — LOW (ref 13–17)
IMM GRANULOCYTES NFR BLD AUTO: 1.6 % — HIGH (ref 0–0.9)
IMM GRANULOCYTES NFR BLD AUTO: 1.6 % — HIGH (ref 0–0.9)
LYMPHOCYTES # BLD AUTO: 2.36 K/UL — SIGNIFICANT CHANGE UP (ref 1–3.3)
LYMPHOCYTES # BLD AUTO: 2.36 K/UL — SIGNIFICANT CHANGE UP (ref 1–3.3)
LYMPHOCYTES # BLD AUTO: 29.3 % — SIGNIFICANT CHANGE UP (ref 13–44)
LYMPHOCYTES # BLD AUTO: 29.3 % — SIGNIFICANT CHANGE UP (ref 13–44)
MAGNESIUM SERPL-MCNC: 2.2 MG/DL — SIGNIFICANT CHANGE UP (ref 1.6–2.6)
MAGNESIUM SERPL-MCNC: 2.2 MG/DL — SIGNIFICANT CHANGE UP (ref 1.6–2.6)
MCHC RBC-ENTMCNC: 31.6 PG — SIGNIFICANT CHANGE UP (ref 27–34)
MCHC RBC-ENTMCNC: 31.6 PG — SIGNIFICANT CHANGE UP (ref 27–34)
MCHC RBC-ENTMCNC: 34 GM/DL — SIGNIFICANT CHANGE UP (ref 32–36)
MCHC RBC-ENTMCNC: 34 GM/DL — SIGNIFICANT CHANGE UP (ref 32–36)
MCV RBC AUTO: 92.9 FL — SIGNIFICANT CHANGE UP (ref 80–100)
MCV RBC AUTO: 92.9 FL — SIGNIFICANT CHANGE UP (ref 80–100)
MONOCYTES # BLD AUTO: 0.69 K/UL — SIGNIFICANT CHANGE UP (ref 0–0.9)
MONOCYTES # BLD AUTO: 0.69 K/UL — SIGNIFICANT CHANGE UP (ref 0–0.9)
MONOCYTES NFR BLD AUTO: 8.6 % — SIGNIFICANT CHANGE UP (ref 2–14)
MONOCYTES NFR BLD AUTO: 8.6 % — SIGNIFICANT CHANGE UP (ref 2–14)
NEUTROPHILS # BLD AUTO: 4.76 K/UL — SIGNIFICANT CHANGE UP (ref 1.8–7.4)
NEUTROPHILS # BLD AUTO: 4.76 K/UL — SIGNIFICANT CHANGE UP (ref 1.8–7.4)
NEUTROPHILS NFR BLD AUTO: 59.1 % — SIGNIFICANT CHANGE UP (ref 43–77)
NEUTROPHILS NFR BLD AUTO: 59.1 % — SIGNIFICANT CHANGE UP (ref 43–77)
NRBC # BLD: 0 /100 WBCS — SIGNIFICANT CHANGE UP (ref 0–0)
NRBC # BLD: 0 /100 WBCS — SIGNIFICANT CHANGE UP (ref 0–0)
NT-PROBNP SERPL-SCNC: 34 PG/ML — SIGNIFICANT CHANGE UP (ref 0–450)
NT-PROBNP SERPL-SCNC: 34 PG/ML — SIGNIFICANT CHANGE UP (ref 0–450)
PLATELET # BLD AUTO: 258 K/UL — SIGNIFICANT CHANGE UP (ref 150–400)
PLATELET # BLD AUTO: 258 K/UL — SIGNIFICANT CHANGE UP (ref 150–400)
POTASSIUM SERPL-MCNC: 4.2 MMOL/L — SIGNIFICANT CHANGE UP (ref 3.5–5.3)
POTASSIUM SERPL-MCNC: 4.2 MMOL/L — SIGNIFICANT CHANGE UP (ref 3.5–5.3)
POTASSIUM SERPL-SCNC: 4.2 MMOL/L — SIGNIFICANT CHANGE UP (ref 3.5–5.3)
POTASSIUM SERPL-SCNC: 4.2 MMOL/L — SIGNIFICANT CHANGE UP (ref 3.5–5.3)
PROT SERPL-MCNC: 6.6 G/DL — SIGNIFICANT CHANGE UP (ref 6–8.3)
PROT SERPL-MCNC: 6.6 G/DL — SIGNIFICANT CHANGE UP (ref 6–8.3)
RBC # BLD: 3.96 M/UL — LOW (ref 4.2–5.8)
RBC # BLD: 3.96 M/UL — LOW (ref 4.2–5.8)
RBC # FLD: 12.8 % — SIGNIFICANT CHANGE UP (ref 10.3–14.5)
RBC # FLD: 12.8 % — SIGNIFICANT CHANGE UP (ref 10.3–14.5)
SODIUM SERPL-SCNC: 133 MMOL/L — LOW (ref 135–145)
SODIUM SERPL-SCNC: 133 MMOL/L — LOW (ref 135–145)
TROPONIN I, HIGH SENSITIVITY RESULT: 7.2 NG/L — SIGNIFICANT CHANGE UP
TROPONIN I, HIGH SENSITIVITY RESULT: 7.2 NG/L — SIGNIFICANT CHANGE UP
WBC # BLD: 8.05 K/UL — SIGNIFICANT CHANGE UP (ref 3.8–10.5)
WBC # BLD: 8.05 K/UL — SIGNIFICANT CHANGE UP (ref 3.8–10.5)
WBC # FLD AUTO: 8.05 K/UL — SIGNIFICANT CHANGE UP (ref 3.8–10.5)
WBC # FLD AUTO: 8.05 K/UL — SIGNIFICANT CHANGE UP (ref 3.8–10.5)

## 2023-11-24 PROCEDURE — 99285 EMERGENCY DEPT VISIT HI MDM: CPT

## 2023-11-24 PROCEDURE — 99285 EMERGENCY DEPT VISIT HI MDM: CPT | Mod: 25

## 2023-11-24 PROCEDURE — 99451 NTRPROF PH1/NTRNET/EHR 5/>: CPT

## 2023-11-24 PROCEDURE — 84484 ASSAY OF TROPONIN QUANT: CPT

## 2023-11-24 PROCEDURE — 93005 ELECTROCARDIOGRAM TRACING: CPT

## 2023-11-24 PROCEDURE — 83880 ASSAY OF NATRIURETIC PEPTIDE: CPT

## 2023-11-24 PROCEDURE — 93010 ELECTROCARDIOGRAM REPORT: CPT

## 2023-11-24 PROCEDURE — 83735 ASSAY OF MAGNESIUM: CPT

## 2023-11-24 PROCEDURE — 96361 HYDRATE IV INFUSION ADD-ON: CPT

## 2023-11-24 PROCEDURE — 85025 COMPLETE CBC W/AUTO DIFF WBC: CPT

## 2023-11-24 PROCEDURE — 96360 HYDRATION IV INFUSION INIT: CPT

## 2023-11-24 PROCEDURE — 36415 COLL VENOUS BLD VENIPUNCTURE: CPT

## 2023-11-24 PROCEDURE — 80053 COMPREHEN METABOLIC PANEL: CPT

## 2023-11-24 PROCEDURE — 71045 X-RAY EXAM CHEST 1 VIEW: CPT | Mod: 26

## 2023-11-24 PROCEDURE — 71045 X-RAY EXAM CHEST 1 VIEW: CPT

## 2023-11-24 RX ORDER — SODIUM CHLORIDE 9 MG/ML
1000 INJECTION INTRAMUSCULAR; INTRAVENOUS; SUBCUTANEOUS ONCE
Refills: 0 | Status: COMPLETED | OUTPATIENT
Start: 2023-11-24 | End: 2023-11-24

## 2023-11-24 RX ADMIN — SODIUM CHLORIDE 1000 MILLILITER(S): 9 INJECTION INTRAMUSCULAR; INTRAVENOUS; SUBCUTANEOUS at 15:00

## 2023-11-24 RX ADMIN — SODIUM CHLORIDE 1000 MILLILITER(S): 9 INJECTION INTRAMUSCULAR; INTRAVENOUS; SUBCUTANEOUS at 13:26

## 2023-11-24 NOTE — CONSULT NOTE ADULT - ASSESSMENT
83 y/o M w/ PMH of HTN, HLD, history of prior TIA (09/2022 adm to [facility]), Type 2 Diabetes Mellitus, hydrocephalus, BPH, and asthma, recent COVID presents for chest pain.    - his chest pain is nonanginal and has resolved.   - trend trops for at least 2 sets  - ekg is nonischemic  - asa  - cont statin     - he did swallow at least 25 sublingual nitor tabs  - toxicology eval  - monitor bp closely    - Monitor and replete electrolytes. Keep K>4.0 and Mg>2.0.   - Further cardiac workup will depend on clinical course.   If BP is normal and trops neg can fu as an outpt.

## 2023-11-24 NOTE — ED ADULT TRIAGE NOTE - CHIEF COMPLAINT QUOTE
Pt was having chest pain as per son and took 25 SL Nitro all at once [FreeTextEntry1] : PFTs deferred for now until can be scheduled

## 2023-11-24 NOTE — ED PROVIDER NOTE - PROGRESS NOTE DETAILS
cardiology dr Bee consulted will see pt for the cp and od  poison control called. cardiology dr Bee consulted will see pt for the cp and od  [facility] medical toxicology paged no call back [phone] called poison control-if large ingestion, given the extensive first pass metabolism no concerns regarding the drop in blood pressure, recc 4 hour obs. dw toxicology- if bp remains low given the high dose taken extend observation time from 4 hours to 6 hours. continue the iv bolus after 4 hours pt bp improved he has no symptoms  results reviewed with pt and son, copies provided awzre of elev bgl  Reevaluated patient at bedside.  Patient feeling much improved.  Discussed the results of all diagnostic testing in ED and copies of all reports given.   An opportunity to ask questions was given.  Discussed the importance of prompt, close medical follow-up.  Patient will return with any changes, concerns or persistent / worsening symptoms.  Understanding of all instructions verbalized.

## 2023-11-24 NOTE — ED ADULT NURSE NOTE - NSFALLHARMRISKINTERV_ED_ALL_ED

## 2023-11-24 NOTE — ED PROVIDER NOTE - CLINICAL SUMMARY MEDICAL DECISION MAKING FREE TEXT BOX
Patient is an 83-year-old male who has a history of hypertension, and CAD with TIA.  Who developed sudden onset chest discomfort and accidentally took an entire bottle of sublingual nitroglycerin orally.  His symptoms resolved in terms of chest discomfort, but was brought to the emergency room out of an abundance of caution due to the consumption of an entire bottle of medication for which she was supposed to only take 1 tablet.  Consultation with poison control and toxicology were consistent with the recommendation patient's first-pass metabolism would likely prevent profound hypotension and symptoms, cardiology saw the patient and evaluated recommend follow-up with outpatient cardiology as there is no evidence of ischemia at this time.  EKG laboratory studies were performed.  Chest x-ray is performed patient is not orthostatic.  On read disposition and evaluation patient appears well.  His blood sugar was noted to be elevated.  This was reported to the patient and the son and they will follow with the primary care physician.  This chart was made with dictation software and may contain typographical errors.

## 2023-11-24 NOTE — ED PROVIDER NOTE - PATIENT PORTAL LINK FT
You can access the FollowMyHealth Patient Portal offered by [facility] by registering at the following website: [email or web address]. By joining [employer]’s FollowMyHealth portal, you will also be able to view your health information using other applications (apps) compatible with our system.

## 2023-11-24 NOTE — ED PROVIDER NOTE - OBJECTIVE STATEMENT
Patient is an 83-year-old male who has a history of hypertension coronary artery disease, TIA.  Remote former smoker not a drinker.  No drug allergies.  All of his doctors are in [place].  Today approximately 1 hour ago he developed sudden onset of substernal chest pressure chest pain with shortness of breath.  Never having used it before he took an entire bottle of sublingual nitroglycerin, swallowing 25 tablets.  It is 0.4 mg each.  He did not let them dissolve but rather he swallowed them entirely.  He denies headache ringing in his ears lightheadedness.  His chest pain is gone.  I have an abundance of concern he came to the emergency room for evaluation of the chest pain.  He is never used nitroglycerin before was uncertain how to use it.  This was an accidental ingestion.  This data was obtained from the patient by his son who speaks Indonesian and English fluently.

## 2023-11-24 NOTE — CONSULT NOTE ADULT - PROBLEM SELECTOR RECOMMENDATION 9
MD Cummins: 83-year-old male status post large accidental nitroglycerin ingestion.  Patient was having chest pain and he took the nitroglycerin thinking it would treat it but he misread the label and ingested nearly all of the bottle.  Patient had transient hypotension without persistent lightheadedness or syncope.  Approximately 3 and half hours after the ingestion blood pressure 90/60.    Recommendations: Trend blood pressure for at least 6 hours postingestion    If no hemodynamic effects at 6 hours postingestion then patient can safely be medically cleared from a toxicologic standpoint.      Patient should be counseled as to the appropriate use of nitroglycerin for the treatment of anginal chest pain.

## 2023-11-24 NOTE — ED ADULT NURSE NOTE - CADM POA PRESS ULCER
ED Provider Note    Primary care provider: Eren Heath M.D.  History obtained from: patient  History limited by: none    CHIEF COMPLAINT  Chief Complaint   Patient presents with    Shortness of Breath     For the last day, report pain with coughing.  Pt on 4 liters NC normally on RA.  Tested for covid 11/25 and was negative.  Reports temps since before thanksgiving.         HPI  Mónica Keita is a 48 y.o. female who presents to the Emergency Department for evaluation of shortness of breath.  Patient reports that approximately 2 weeks ago she was ill with flulike symptoms, she was having fevers and cough at that time.  She has had a persistent cough since then but reports that she became more short of breath over the last few days.  She denies any current fevers or chills.  No known history of lung or cardiac disease.  On arrival patient's room air oxygen saturation is 75%, patient reports that she does not wear oxygen at home.  She does not smoke.    REVIEW OF SYSTEMS  Review of Systems   Constitutional:  Positive for chills and malaise/fatigue. Negative for fever.   Respiratory:  Positive for cough and shortness of breath.    Cardiovascular:  Negative for chest pain.   Gastrointestinal:  Negative for abdominal pain, nausea and vomiting.   Genitourinary:  Negative for dysuria.   Neurological:  Negative for headaches.   All other systems reviewed and are negative.      PAST MEDICAL HISTORY   has a past medical history of Adverse effect of anesthesia, Anemia, Anxiety, Bronchitis (2009), Diabetes (HCC), Dissection of cervical artery (HCC), Elevated liver enzymes, Hypertension, Neuroendocrine tumor (11/2021), and Pneumonia (2009).    SURGICAL HISTORY   has a past surgical history that includes hysterectomy, vaginal (06/04/2014); pelvic exam under anesthesia (06/17/2014); vaginal hysterectomy total (06/17/2014); appendectomy (2014); upper gi endoscopy,diagnosis (N/A, 11/01/2021); and egd w/endoscopic  "ultrasound (N/A, 11/01/2021).    SOCIAL HISTORY  Social History     Tobacco Use    Smoking status: Never    Smokeless tobacco: Never   Vaping Use    Vaping Use: Never used   Substance Use Topics    Alcohol use: No    Drug use: No      Social History     Substance and Sexual Activity   Drug Use No       FAMILY HISTORY  Family History   Problem Relation Age of Onset    Heart Disease Father     No Known Problems Sister     Heart Attack Brother         CAD. 3 MI's.     No Known Problems Son     No Known Problems Daughter        CURRENT MEDICATIONS  Home Medications       Reviewed by Moo Robb (Pharmacy Tech) on 12/05/22 at 1509  Med List Status: Complete     Medication Last Dose Status   amLODIPine (NORVASC) 5 MG Tab 12/2/2022 Active   aspirin 81 MG EC tablet 12/2/2022 Active   Biotin 5000 MCG Cap 12/2/2022 Active   buPROPion (WELLBUTRIN XL) 300 MG XL tablet 12/2/2022 Active   Cholecalciferol (VITAMIN D3) 125 MCG (5000 UT) Cap 12/2/2022 Active   Coenzyme Q10 (COQ10 PO) 12/2/2022 Active   escitalopram (LEXAPRO) 20 MG tablet 12/2/2022 Active   Ferrous Sulfate (IRON PO) 12/2/2022 Active   hydrOXYzine HCl (ATARAX) 25 MG Tab unknown Active   lisinopril (PRINIVIL) 20 MG Tab 12/2/2022 Active   metFORMIN (GLUCOPHAGE) 500 MG Tab 12/2/2022 Active   omeprazole (PRILOSEC) 20 MG delayed-release capsule 12/5/2022 Active                    ALLERGIES  No Known Allergies    PHYSICAL EXAM  VITAL SIGNS: /68   Pulse (!) 110   Temp 37.4 °C (99.3 °F) (Oral)   Resp 15   Ht 1.676 m (5' 6\")   Wt 78.6 kg (173 lb 4.5 oz)   LMP 06/14/2012   SpO2 90%   BMI 27.97 kg/m²   Vitals reviewed by myself.  Physical Exam  Nursing note and vitals reviewed.  Constitutional: Well-developed and well-nourished.  Patient is ill-appearing  HENT: Head is normocephalic and atraumatic.  Eyes: extra-ocular movements intact  Cardiovascular: Tachycardic rate and  regular rhythm. No murmur heard.  Pulmonary/Chest: Coarse rhonchi noted throughout " lung fields, wet cough noted on exam  Abdominal: Soft and non-tender. No distention.    Musculoskeletal: Extremities exhibit normal range of motion without edema or tenderness.   Neurological: Awake and alert  Skin: Skin is warm and dry. No rash.         DIAGNOSTIC STUDIES /  LABS  Labs Reviewed   LACTIC ACID - Abnormal; Notable for the following components:       Result Value    Lactic Acid 2.7 (*)     All other components within normal limits   LACTIC ACID - Abnormal; Notable for the following components:    Lactic Acid 2.7 (*)     All other components within normal limits    Narrative:     Repeat if initial lactic acid result is greater than 2   CBC WITH DIFFERENTIAL - Abnormal; Notable for the following components:    WBC 3.7 (*)     Hematocrit 36.4 (*)     All other components within normal limits   COMP METABOLIC PANEL - Abnormal; Notable for the following components:    Sodium 130 (*)     Potassium 3.3 (*)     Chloride 94 (*)     AST(SGOT) 46 (*)     Albumin 2.8 (*)     Globulin 3.8 (*)     All other components within normal limits   URINALYSIS - Abnormal; Notable for the following components:    Ketones 15 (*)     Protein 100 (*)     Bilirubin Moderate (*)     All other components within normal limits    Narrative:     Indication for culture:->Evaluation for sepsis without a  clear source of infection   PROCALCITONIN - Abnormal; Notable for the following components:    Procalcitonin 35.70 (*)     All other components within normal limits   URINE MICROSCOPIC (W/UA) - Abnormal; Notable for the following components:    WBC 5-10 (*)     Bacteria Few (*)     Epithelial Cells Moderate (*)     All other components within normal limits    Narrative:     Indication for culture:->Evaluation for sepsis without a  clear source of infection   ESTIMATED GFR   COV-2, FLU A/B, AND RSV BY PCR (CEPProsettaID)   DIFFERENTIAL MANUAL   PERIPHERAL SMEAR REVIEW   PLATELET ESTIMATE   MORPHOLOGY   LACTIC ACID   URINE CULTURE(NEW)     "Narrative:     Indication for culture:->Evaluation for sepsis without a  clear source of infection   BLOOD CULTURE    Narrative:     Per Hospital Policy: Only change Specimen Src: to \"Line\" if  specified by physician order.   BLOOD CULTURE    Narrative:     Per Hospital Policy: Only change Specimen Src: to \"Line\" if  specified by physician order.       All labs reviewed by me.    EKG Interpretation:  Interpreted by myself    12 Lead EKG interpreted by me to show:  EKG at 11:52 AM: Sinus tachycardia, heart rate 117, normal axis, normal intervals, , QRS 85, QTc 430, no acute ST-T segment changes, no evidence of acute arrhythmia or ischemia  My impression of this EKG: Does not indicate ischemia or arrhythmia at this time.    RADIOLOGY  DX-CHEST-PORTABLE (1 VIEW)   Final Result      Multifocal bilateral pneumonia.           The radiologist's interpretation of all radiological studies have been reviewed by me.        COURSE & MEDICAL DECISION MAKING  Nursing notes, VS, PMSFHx reviewed in chart.    Patient is a 48-year-old female who comes in for evaluation of cough and shortness of breath.  Differential diagnosis includes viral syndrome, pneumonia, bacteremia, sepsis.  On arrival patient is hypoxic and tachycardic therefore sepsis protocol is initiated.  She requires 5 L by nasal cannula to maintain oxygen saturation above 90%.  She is empirically started on ceftriaxone and azithromycin for community-acquired pneumonia.  He is also empirically started on sepsis 30 cc/kg fluid bolus.    Patient EKG returns demonstrates sinus tachycardia without evidence of arrhythmia or ischemia.  Chest x-ray is consistent with multifocal pneumonia.  Labs returned and are notable for lactic acidosis of 2.7 which is being treated with IV fluids.  She has a white count of 3.7 and procalcitonin of 35 consistent with her pneumonia.  She will be hospitalized for ongoing management of her multifocal pneumonia and hypoxemia.  I discussed " the case with Dr. Aguirre who has accepted patient for hospitalization.  Patient is in guarded condition.      HYDRATION: Based on the patient's presentation of Sepsis the patient was given IV fluids. IV Hydration was used because oral hydration was not adequate alone. Upon recheck following hydration, the patient was improved.        FINAL IMPRESSION  1. Hypoxia    2. Pneumonia of both lungs due to infectious organism, unspecified part of lung                Unable to assess at this time due to patient’s acuity

## 2023-11-24 NOTE — ED ADULT NURSE NOTE - OBJECTIVE STATEMENT
Patient is 82yo M BIB son with c/o took a whole bottle of nitroglycerine SL pills around 11:30-11:45am today because he was having chest pain. Per son report patient was unaware he was only supposed to take one. Patient VSS. Patient denies chest pain at this time but c/o dizziness, BP currently 111/63, HR 70s.

## 2023-11-24 NOTE — CONSULT NOTE ADULT - ASSESSMENT
Assessment	  Concern for nitroglycerin ingestion    Toxicologic Context: Nitroglycerin exerts activity as a precursor to nitric oxide, which induces vasodilation via the formation of cGMP, increased intracellular calcium and inhibition of actin-myosin associated vascular smooth muscle activity, leading to vasodilation. Nitroglycerin, when exposed through sublingual route, has a half life ot 1-4 minutes, followed by hepatic metabolism with a half life of 40 minutes. In the setting of a massive ingestion such as described (0.4 mg x25 tablets for total dose of 10 g), symptoms may be significantly prolonged, but likely well tolerated.    Recommendations:  Treatment:   1)  Monitor for 4 hours, if asymptomatic can be toxicologically cleared. Continue workup for chest pain per primary team.  2)  Episode of mild hypotension noted 3.5 hours after ingestion (90/60), if BP continues to be lower, extend observation to 6 hours, obtain orthostatics due to concern for vasodilation.  3)  Discuss optimal dosing and route of medications.    All plans discussed with primary managing team.    Thank you for the consultation. Please reach out via Teams with any questions.  Jw Shaw MD, Medical Toxicology Fellow

## 2023-11-24 NOTE — ED PROVIDER NOTE - NSFOLLOWUPINSTRUCTIONS_ED_ALL_ED_FT
Prompt follow-up with your cardiologist is essential.  Take medications only as prescribed.  Should you have worsening symptoms or any concerns call 911 and go to the nearest emergency room.  Patient is a 74-year-old male who lives at home by himself.  He has a history of A-fib on Coumadin, HTN, HLD.  Since yesterday he has been feeling little bit off balance he had floaters in his eyes, and felt like he needed to walk with a walker to keep his balance keep from falling.  He denies any chest pain or shortness of breath.  He denies any weakness or numbness.  He tried to drink some fluids last night and felt a little bit better went to bed.  This morning upon awakening he walked downstairs, and the symptoms continued again so he eventually came to the emergency room for evaluation.  Denies any history of prior onset of symptoms.  Denies any weakness or numbness.  Denies any speech changes.  He does have a history of falling in the past unable to get up due to generalized body weakness.  He did not fall today.  He was fearful of falling.    On evaluation is a well-developed well-nourished male in no apparent distress.  HEENT is unremarkable.  No evidence of trauma.  Neck is supple.  No nystagmus.  Mucous membranes are moist.  No G/F/R.  Cardiac exam is regular rate and rhythm.  Chest exam is clear to auscultation.  There is no respiratory distress.  Abdomen is soft and nontender.  Musculoskeletal exam is unremarkable.  There is no focal deformity.  Neurologic exam NIHSS is equal to 0.  Finger-to-nose and other cerebellar findings are normal except that upon standing patient had a sudden onset of dizziness after after prolonged sitting.  This was an exacerbation of his presenting symptoms.  He had no nystagmus at this time.  No other localizing symptoms.  Unable to ambulate safely, with the setting of a normal CT angiogram and laboratory studies patient likely needs to be admitted for possible orthostasis, dehydration as his urine is very dark, or TIA.  And MRI imaging.  This chart was made with dictation software and may contain typographical errors.

## 2023-11-24 NOTE — CONSULT NOTE ADULT - TIME BILLING
[FreeTextEntry1] : Patient is 52 yo RH woman with PMHx of Grade 4 GBM identified in 4/2022 found during w/o of HA/visual anopsia/seizure ("blank stare" followed by GTC) s/p s/p resection STR w/ Dr. Dumont on 4/12/22. She presented to hospital on 1/2/23 w expressive aphasia. NIHSS 5 for severe aphasia and lack of LOC. She was found w/ R parietal lobe stroke, etiology unclear. \par \par CTA was NEG for LVO and Dr. Dumont deemed sx result of progress of disease. She was placed on steroid taper.  \par \par Since 6/2022, she was on chemoRT w/ Temodar but had worsening of sx 2/2 recurrent edema. Later placed on Avastin w/ some response, but then became thrombocytopenic 2/2 Temodar.  \par \par Triage /115 and  \par \par MR brain result noted showing posttreatment effect (included calcifications and TRACE PETECHIAL HEMORRHAGES) \par #Small cortical/subcortical acute infarct in R parietal lobe \par Etiology? --hypercoag from disease or Bevacizumab?\par \par Heme/Onc CLEARED her for ASA 81 \par STROKE Team asked for Heme/Onc clearance for AP bc of thrombocytopenia to 94 \par \par EEG with left sharp waves (potential for partial L hemispheric seizure) -> Keppra 1500 BID at d/c, Mg >2, ASA 81, Seizure precautions, ST, Lipitor 80 \par \par She was on lower dose Keppra PTA \par --------------------- \par B/L CUS MILD b/l ICS stenosis \par LDL 96 in 4/2022 \par ---------------------------------\par  says to his understanding, cancer was stable \par His main concern is decided about continuing cancer treatment. She just finished 5 day chemo last Monday night \par She has poor STM and anxiety, now it's worse, no neglect, but has issues with identifying objects\par  says her speech changes are much worse \par She has RHH \par \par \par  Overdose on xenobiotic(s) necessitating emergency department intervention, laboratory & ECG evaluation, as well as and disposition recommendations by Med Tox service.

## 2023-11-24 NOTE — CONSULT NOTE ADULT - ATTENDING COMMENTS
MD Cummins phone consultation:  patient encounter discussed at-length with the fellow, and I agree with the impression & plan.

## 2023-11-24 NOTE — ED ADULT NURSE NOTE - BEFAST BALANCE
PT resting quietly, pt ambulating to bathroom, room remains safe, sitter at bedside.      Talia Reich RN  11/24/23 [id number] No

## 2023-11-25 DIAGNOSIS — T46.3X1A: ICD-10-CM

## 2023-11-25 DIAGNOSIS — T46.3X1S: ICD-10-CM

## 2023-11-28 RX ORDER — METFORMIN HYDROCHLORIDE 850 MG/1
1 TABLET ORAL
Qty: 0 | Refills: 0 | DISCHARGE

## 2023-11-28 RX ORDER — ESOMEPRAZOLE MAGNESIUM 40 MG/1
1 CAPSULE, DELAYED RELEASE ORAL
Qty: 0 | Refills: 0 | DISCHARGE

## 2023-11-28 RX ORDER — SIMVASTATIN 20 MG/1
1 TABLET, FILM COATED ORAL
Qty: 0 | Refills: 0 | DISCHARGE

## 2023-11-28 RX ORDER — MONTELUKAST 4 MG/1
1 TABLET, CHEWABLE ORAL
Qty: 0 | Refills: 0 | DISCHARGE

## 2023-11-28 RX ORDER — SITAGLIPTIN 50 MG/1
1 TABLET, FILM COATED ORAL
Qty: 0 | Refills: 0 | DISCHARGE

## 2023-11-28 RX ORDER — LATANOPROST 0.05 MG/ML
1 SOLUTION/ DROPS OPHTHALMIC; TOPICAL
Qty: 0 | Refills: 0 | DISCHARGE

## 2023-11-28 RX ORDER — MELOXICAM 15 MG/1
1 TABLET ORAL
Qty: 0 | Refills: 0 | DISCHARGE

## 2023-11-28 RX ORDER — ICOSAPENT ETHYL 500 MG/1
1 CAPSULE, LIQUID FILLED ORAL
Qty: 0 | Refills: 0 | DISCHARGE

## 2023-11-28 RX ORDER — DULOXETINE HYDROCHLORIDE 30 MG/1
1 CAPSULE, DELAYED RELEASE ORAL
Qty: 0 | Refills: 0 | DISCHARGE

## 2023-11-28 RX ORDER — ACETAZOLAMIDE 250 MG/1
1 TABLET ORAL
Qty: 0 | Refills: 0 | DISCHARGE

## 2023-11-28 RX ORDER — OLMESARTAN MEDOXOMIL 5 MG/1
1 TABLET, FILM COATED ORAL
Qty: 0 | Refills: 0 | DISCHARGE

## 2023-11-28 RX ORDER — FINASTERIDE 5 MG/1
1 TABLET, FILM COATED ORAL
Qty: 0 | Refills: 0 | DISCHARGE

## 2023-11-28 RX ORDER — TAMSULOSIN HYDROCHLORIDE 0.4 MG/1
1 CAPSULE ORAL
Qty: 0 | Refills: 0 | DISCHARGE

## 2024-01-01 ENCOUNTER — RX RENEWAL (OUTPATIENT)
Age: 84
End: 2024-01-01

## 2024-02-07 ENCOUNTER — LABORATORY RESULT (OUTPATIENT)
Age: 84
End: 2024-02-07

## 2024-02-07 ENCOUNTER — APPOINTMENT (OUTPATIENT)
Age: 84
End: 2024-02-07
Payer: MEDICARE

## 2024-02-07 VITALS
DIASTOLIC BLOOD PRESSURE: 74 MMHG | HEIGHT: 64 IN | WEIGHT: 158 LBS | SYSTOLIC BLOOD PRESSURE: 131 MMHG | OXYGEN SATURATION: 97 % | HEART RATE: 74 BPM | BODY MASS INDEX: 26.98 KG/M2

## 2024-02-07 DIAGNOSIS — N40.1 BENIGN PROSTATIC HYPERPLASIA WITH LOWER URINARY TRACT SYMPMS: ICD-10-CM

## 2024-02-07 DIAGNOSIS — N13.8 BENIGN PROSTATIC HYPERPLASIA WITH LOWER URINARY TRACT SYMPMS: ICD-10-CM

## 2024-02-07 PROBLEM — E11.9 TYPE 2 DIABETES MELLITUS WITHOUT COMPLICATIONS: Chronic | Status: ACTIVE | Noted: 2022-09-25

## 2024-02-07 PROBLEM — E78.00 PURE HYPERCHOLESTEROLEMIA, UNSPECIFIED: Chronic | Status: ACTIVE | Noted: 2022-09-25

## 2024-02-07 PROBLEM — I10 ESSENTIAL (PRIMARY) HYPERTENSION: Chronic | Status: ACTIVE | Noted: 2022-09-25

## 2024-02-07 PROBLEM — N40.0 BENIGN PROSTATIC HYPERPLASIA WITHOUT LOWER URINARY TRACT SYMPTOMS: Chronic | Status: ACTIVE | Noted: 2022-09-25

## 2024-02-07 PROBLEM — J45.909 UNSPECIFIED ASTHMA, UNCOMPLICATED: Chronic | Status: ACTIVE | Noted: 2022-09-25

## 2024-02-07 PROCEDURE — 99213 OFFICE O/P EST LOW 20 MIN: CPT

## 2024-02-07 NOTE — PHYSICAL EXAM
[Normal Appearance] : normal appearance [Well Groomed] : well groomed [General Appearance - In No Acute Distress] : no acute distress [Edema] : no peripheral edema [Respiration, Rhythm And Depth] : normal respiratory rhythm and effort [Exaggerated Use Of Accessory Muscles For Inspiration] : no accessory muscle use [Abdomen Soft] : soft [Abdomen Tenderness] : non-tender [Costovertebral Angle Tenderness] : no ~M costovertebral angle tenderness [Urethral Meatus] : meatus normal [Penis Abnormality] : normal circumcised penis [Urinary Bladder Findings] : the bladder was normal on palpation [Scrotum] : the scrotum was normal [No Prostate Nodules] : no prostate nodules [Prostate Size ___ gm] : prostate size [unfilled] gm [Normal Station and Gait] : the gait and station were normal for the patient's age [] : no rash [No Focal Deficits] : no focal deficits [Oriented To Time, Place, And Person] : oriented to person, place, and time [Affect] : the affect was normal [Mood] : the mood was normal [No Palpable Adenopathy] : no palpable adenopathy

## 2024-02-14 LAB
APPEARANCE: ABNORMAL
BILIRUBIN URINE: NEGATIVE
BLOOD URINE: NEGATIVE
COLOR: YELLOW
GLUCOSE QUALITATIVE U: NEGATIVE MG/DL
KETONES URINE: NEGATIVE MG/DL
LEUKOCYTE ESTERASE URINE: ABNORMAL
NITRITE URINE: POSITIVE
PH URINE: 7
PROTEIN URINE: NEGATIVE MG/DL
SPECIFIC GRAVITY URINE: 1.01
UROBILINOGEN URINE: 0.2 MG/DL

## 2024-02-19 PROBLEM — N40.1 BPH WITH OBSTRUCTION/LOWER URINARY TRACT SYMPTOMS: Status: ACTIVE | Noted: 2019-09-20

## 2024-02-19 NOTE — ASSESSMENT
[FreeTextEntry1] : 84 yo M with BPH  - PVR =80ml  All BPH treatment options were reviewed. This included surveillance, all medical therapeutic options, all outlet procedures including office based, TURP, bipolar TURP, button vaporization, thulium/holmium, suprapubic/retropubic simple (open, robotic) prostatectomy. - Continue dual medical therapy - UA, culture - FU in 1 year

## 2024-02-27 NOTE — ED ADULT TRIAGE NOTE - PAIN RATING/NUMBER SCALE (0-10): REST
0 Indication: Provided medical care services as part of ongoing care related to the patient's single, serious or complex chronic condition.

## 2024-03-06 ENCOUNTER — APPOINTMENT (OUTPATIENT)
Age: 84
End: 2024-03-06
Payer: MEDICARE

## 2024-03-06 DIAGNOSIS — R82.71 BACTERIURIA: ICD-10-CM

## 2024-03-06 PROCEDURE — 76775 US EXAM ABDO BACK WALL LIM: CPT

## 2024-03-17 ENCOUNTER — RX RENEWAL (OUTPATIENT)
Age: 84
End: 2024-03-17

## 2024-03-17 PROBLEM — R82.71 BACTERIURIA, CHRONIC: Status: ACTIVE | Noted: 2024-02-14

## 2024-03-17 RX ORDER — FINASTERIDE 5 MG/1
5 TABLET, FILM COATED ORAL
Qty: 90 | Refills: 3 | Status: ACTIVE | COMMUNITY
Start: 2020-01-10 | End: 1900-01-01

## 2024-03-27 NOTE — ED PROVIDER NOTE - CROS ED CONS ALL NEG
- - -
Additional Notes: Pt reports nevus present for years and remains unchanged
Render Risk Assessment In Note?: no
Detail Level: Simple

## 2024-10-03 NOTE — CONSULT NOTE ADULT - ASSESSMENT
81 y/o M w/ PMH of HTN, HLD, history of prior TIA (09/2022 adm to [facility]), Type 2 Diabetes Mellitus, hydrocephalus, BPH, and asthma BIBA for R sided weakness and cough.    sepsis eval  HTN  HLD  hx of TIA  DM  Hydrocephalus  BPH  Asthma  Cough - Dyspnea -     on singulair for Asthma  will trial short course of Prednisone - NEBS -   emp ABX in progress  sepsis eval - cx - biomarkers  ct imaging shows no acute lung pathology  may benefit from outpatient PFT and Pulm Eval  cont [facility] rx regimen  monitor VS and Sat  keep sat > 88 pct  spoke with SON   Last Office Visit  -  10/18/23  Next Office Visit  -  n/a    Last Filled  -  7/7/24  Last UDS -    Contract -

## 2024-12-18 NOTE — ED ADULT NURSE REASSESSMENT NOTE - SPO2 (%)
FYI -   Pt called looking to have results for her Holter monitor relayed over.   Informed pt still currently pending to be reviewed by MD/NP.   Will await call for results.   
Holter looks normal.  There was no arrhythmias.  But yes it still needs to be signed by the physician  
Patient following up again on results  Leaving town, would like to know results beforehand  Please advise   
Prelim report has been scanned in.  Does not appear to have any concerning findings.  Can we provide reassurance and call her once the study has been formally dictated?    
Spoke with pt    She verbalized understanding and very thankful.  Aware a second call with formal results/recommendations will be made once the study has been finalized by MD  
98
95
100

## 2024-12-21 ENCOUNTER — RX RENEWAL (OUTPATIENT)
Age: 84
End: 2024-12-21

## 2025-02-05 ENCOUNTER — APPOINTMENT (OUTPATIENT)
Age: 85
End: 2025-02-05

## 2025-02-21 ENCOUNTER — APPOINTMENT (OUTPATIENT)
Age: 85
End: 2025-02-21

## 2025-02-21 DIAGNOSIS — R82.71 BACTERIURIA: ICD-10-CM

## 2025-02-21 PROCEDURE — 76775 US EXAM ABDO BACK WALL LIM: CPT

## 2025-03-25 ENCOUNTER — APPOINTMENT (OUTPATIENT)
Dept: SURGERY | Facility: CLINIC | Age: 85
End: 2025-03-25
Payer: MEDICARE

## 2025-03-25 VITALS
BODY MASS INDEX: 28.17 KG/M2 | HEIGHT: 64 IN | SYSTOLIC BLOOD PRESSURE: 104 MMHG | RESPIRATION RATE: 16 BRPM | OXYGEN SATURATION: 95 % | DIASTOLIC BLOOD PRESSURE: 62 MMHG | WEIGHT: 165 LBS | HEART RATE: 88 BPM

## 2025-03-25 DIAGNOSIS — K43.9 VENTRAL HERNIA W/OUT OBSTRUCTION OR GANGRENE: ICD-10-CM

## 2025-03-25 PROCEDURE — 99203 OFFICE O/P NEW LOW 30 MIN: CPT

## 2025-03-26 ENCOUNTER — RESULT REVIEW (OUTPATIENT)
Age: 85
End: 2025-03-26

## 2025-04-03 ENCOUNTER — OUTPATIENT (OUTPATIENT)
Dept: OUTPATIENT SERVICES | Facility: HOSPITAL | Age: 85
LOS: 1 days | End: 2025-04-03
Payer: MEDICARE

## 2025-04-03 ENCOUNTER — APPOINTMENT (OUTPATIENT)
Dept: CT IMAGING | Facility: CLINIC | Age: 85
End: 2025-04-03
Payer: MEDICARE

## 2025-04-03 DIAGNOSIS — K43.9 VENTRAL HERNIA WITHOUT OBSTRUCTION OR GANGRENE: ICD-10-CM

## 2025-04-03 PROCEDURE — 74177 CT ABD & PELVIS W/CONTRAST: CPT | Mod: 26

## 2025-04-03 PROCEDURE — 74177 CT ABD & PELVIS W/CONTRAST: CPT

## 2025-05-14 ENCOUNTER — NON-APPOINTMENT (OUTPATIENT)
Age: 85
End: 2025-05-14

## 2025-05-16 ENCOUNTER — APPOINTMENT (OUTPATIENT)
Dept: PULMONOLOGY | Facility: CLINIC | Age: 85
End: 2025-05-16
Payer: MEDICARE

## 2025-05-16 VITALS
BODY MASS INDEX: 29.02 KG/M2 | HEIGHT: 64 IN | HEART RATE: 76 BPM | SYSTOLIC BLOOD PRESSURE: 137 MMHG | OXYGEN SATURATION: 96 % | DIASTOLIC BLOOD PRESSURE: 73 MMHG | WEIGHT: 170 LBS

## 2025-05-16 VITALS
RESPIRATION RATE: 17 BRPM | OXYGEN SATURATION: 94 % | HEART RATE: 79 BPM | HEIGHT: 64 IN | WEIGHT: 171 LBS | DIASTOLIC BLOOD PRESSURE: 68 MMHG | SYSTOLIC BLOOD PRESSURE: 124 MMHG | BODY MASS INDEX: 29.19 KG/M2

## 2025-05-16 DIAGNOSIS — R06.02 SHORTNESS OF BREATH: ICD-10-CM
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
## 2025-05-16 DIAGNOSIS — T17.928A FOOD IN RESPIRATORY TRACT, PART UNSPECIFIED CAUSING OTHER INJURY, INITIAL ENCOUNTER: ICD-10-CM

## 2025-05-16 PROCEDURE — 94726 PLETHYSMOGRAPHY LUNG VOLUMES: CPT

## 2025-05-16 PROCEDURE — 94729 DIFFUSING CAPACITY: CPT

## 2025-05-16 PROCEDURE — 99204 OFFICE O/P NEW MOD 45 MIN: CPT | Mod: 25

## 2025-05-16 PROCEDURE — ZZZZZ: CPT

## 2025-05-16 PROCEDURE — 94060 EVALUATION OF WHEEZING: CPT

## 2025-05-20 ENCOUNTER — APPOINTMENT (OUTPATIENT)
Dept: CT IMAGING | Facility: CLINIC | Age: 85
End: 2025-05-20
Payer: MEDICARE

## 2025-05-20 ENCOUNTER — OUTPATIENT (OUTPATIENT)
Dept: OUTPATIENT SERVICES | Facility: HOSPITAL | Age: 85
LOS: 1 days | End: 2025-05-20
Payer: MEDICARE

## 2025-05-20 DIAGNOSIS — R06.02 SHORTNESS OF BREATH: ICD-10-CM

## 2025-05-20 PROCEDURE — 71250 CT THORAX DX C-: CPT

## 2025-05-20 PROCEDURE — 71250 CT THORAX DX C-: CPT | Mod: 26

## 2025-05-29 ENCOUNTER — APPOINTMENT (OUTPATIENT)
Dept: PULMONOLOGY | Facility: CLINIC | Age: 85
End: 2025-05-29
Payer: MEDICARE

## 2025-05-29 DIAGNOSIS — J84.9 INTERSTITIAL PULMONARY DISEASE, UNSPECIFIED: ICD-10-CM

## 2025-05-29 DIAGNOSIS — K21.9 GASTRO-ESOPHAGEAL REFLUX DISEASE W/OUT ESOPHAGITIS: ICD-10-CM

## 2025-05-29 PROCEDURE — G2211 COMPLEX E/M VISIT ADD ON: CPT | Mod: 2W

## 2025-05-29 PROCEDURE — 99214 OFFICE O/P EST MOD 30 MIN: CPT | Mod: 2W

## 2025-05-29 RX ORDER — FAMOTIDINE 20 MG/1
20 TABLET, FILM COATED ORAL DAILY
Qty: 90 | Refills: 2 | Status: ACTIVE | COMMUNITY
Start: 2025-05-29 | End: 1900-01-01

## 2025-06-23 NOTE — CONSULT NOTE ADULT - SUBJECTIVE AND OBJECTIVE BOX
Pt ambulated to the restroom with a steady gait. Pt updated with current plan of care and informed of CT   
  CHIEF COMPLAINT: Patient is a 83y old  Male who presents with a chief complaint of Chest pain     HPI: 81 y/o M w/ PMH of HTN, HLD, history of prior TIA (09/2022 adm to [facility]), Type 2 Diabetes Mellitus, hydrocephalus, BPH, and asthma, recent COVID presents for chest pain.  The son acted as .  Patient speaks only Vietnamese.  He states that he had sudden onset of nonexertional nonradiating chest tightness that started at 11:40 AM today.  The pain persisted so the patient took an aspirin.  He also then took an entire bottle of sublingual nitro and swallowed it.  He currently denies any chest pain, PND, orthopnea, headaches, near syncope, syncope, strokelike symptoms.  Prior to this he was in his usual state of health.  He does not have any recent anginal symptoms.  He sees a cardiologist in [facility].  He denies any recent cardiac workup.      EKG: SR APCs poor R wave progression    REVIEW OF SYSTEMS:   All other review of systems are negative unless indicated above    PAST MEDICAL & SURGICAL HISTORY:  Diabetes      Hypertension      Hypercholesteremia      Asthma      Benign prostatic hyperplasia      No significant past surgical history          SOCIAL HISTORY:  No tobacco, ethanol, or drug abuse.    FAMILY HISTORY:  FH: brain aneurysm      No family history of acute MI or sudden cardiac death.    MEDICATIONS  (STANDING):    MEDICATIONS  (PRN):      Allergies    No Known Allergies    Intolerances        Home meds:  Home Medications:  acetaZOLAMIDE 125 mg oral tablet: 1 tab(s) orally once a day (19 Jul 2023 17:16)  Aspirin Low Dose 81 mg oral delayed release tablet: 1 tab(s) orally once a day (19 Jul 2023 17:16)  DULoxetine 30 mg oral delayed release capsule: 1 cap(s) orally once a day (19 Jul 2023 17:16)  esomeprazole 40 mg oral delayed release capsule: 1 cap(s) orally once a day (19 Jul 2023 17:16)  finasteride 5 mg oral tablet: 1 tab(s) orally once a day (19 Jul 2023 17:16)  icosapent 1 g oral capsule: 1 cap(s) orally 2 times a day (16 Jan 2023 09:52)  Januvia 100 mg oral tablet: 1 tab(s) orally once a day (16 Jan 2023 09:52)  latanoprost 0.005% ophthalmic solution: 1 drop(s) to each affected eye once a day (in the evening) (16 Jan 2023 09:52)  metFORMIN 850 mg oral tablet: 1 tab(s) orally 2 times a day (16 Jan 2023 09:52)  montelukast 10 mg oral tablet: 1 tab(s) orally once a day (16 Jan 2023 09:52)  olmesartan 40 mg oral tablet: 1 tab(s) orally once a day (16 Jan 2023 09:52)  simvastatin 20 mg oral tablet: 1 tab(s) orally once a day (at bedtime) (16 Jan 2023 09:52)  tamsulosin 0.4 mg oral capsule: 1 cap(s) orally once a day (16 Jan 2023 09:52)        VITAL SIGNS:   Vital Signs Last 24 Hrs  T(C): 36.7 (24 Nov 2023 12:08), Max: 36.7 (24 Nov 2023 12:08)  T(F): 98 (24 Nov 2023 12:08), Max: 98 (24 Nov 2023 12:08)  HR: 78 (24 Nov 2023 12:08) (78 - 78)  BP: 111/63 (24 Nov 2023 12:08) (111/63 - 111/63)  BP(mean): --  RR: 14 (24 Nov 2023 12:08) (14 - 14)  SpO2: 96% (24 Nov 2023 12:08) (96% - 96%)    Parameters below as of 24 Nov 2023 12:08  Patient On (Oxygen Delivery Method): room air        I&O's Summary      On Exam:     Constitutional: NAD, awake   HEENT: Moist Mucous Membranes, Anicteric  Pulmonary: Non-labored, breath sounds are clear bilaterally, No wheezing, rales or rhonchi  Cardiovascular: Regular, S1 and S2, No murmurs, rubs, gallops or clicks  Gastrointestinal: Bowel Sounds present, soft, nontender.   Lymph: No peripheral edema. No lymphadenopathy.  Skin: No visible rashes or ulcers.  Psych:  Mood & affect appropriate    LABS: All Labs Reviewed:                        12.5   8.05  )-----------( 258      ( 24 Nov 2023 12:51 )             36.8     24 Nov 2023 12:51    133    |  102    |  13     ----------------------------<  443    4.2     |  23     |  1.10     Ca    8.5        24 Nov 2023 12:51  Mg     2.2       24 Nov 2023 12:51    TPro  6.6    /  Alb  3.4    /  TBili  0.5    /  DBili  x      /  AST  23     /  ALT  35     /  AlkPhos  111    24 Nov 2023 12:51        - Troponin: <-7.2  Blood Culture:           RADIOLOGY:             
MEDICAL TOXICOLOGY CONSULT    HPI:83 Y M H/O HTN, CAD, presenting after unintentional ingestion of nitroglycerin. States ~ 1 hour prior to presentation ingested 25 tablets of 0.4 mg nitroglycerin in setting of episode of chest pain. Denies any nausea, vomiting, dizziness, syncope. VS only positive for mild hypotension ~3.5 hours post ingestion with BP of 90/60, HR 70s to 80s. No other positive PE or ROS     PAST MEDICAL & SURGICAL HISTORY:  Diabetes      Hypertension      Hypercholesteremia      Asthma      Benign prostatic hyperplasia      No significant past surgical history          MEDICATION HISTORY:      FAMILY HISTORY:  FH: brain aneuysm    Vital Signs Last 24 Hrs  T(C): 36.7 (24 Nov 2023 12:08), Max: 36.7 (24 Nov 2023 12:08)  T(F): 98 (24 Nov 2023 12:08), Max: 98 (24 Nov 2023 12:08)  HR: 64 (24 Nov 2023 14:53) (64 - 78)  BP: 128/64 (24 Nov 2023 14:53) (111/63 - 128/64)  BP(mean): --  RR: 16 (24 Nov 2023 14:53) (14 - 16)  SpO2: 100% (24 Nov 2023 14:53) (96% - 100%)    Parameters below as of 24 Nov 2023 14:53  Patient On (Oxygen Delivery Method): room air        SIGNIFICANT LABORATORY STUDIES:                        12.5   8.05  )-----------( 258      ( 24 Nov 2023 12:51 )             36.8       11-24    133<L>  |  102  |  13  ----------------------------<  443<H>  4.2   |  23  |  1.10    Ca    8.5      24 Nov 2023 12:51  Mg     2.2     11-24    TPro  6.6  /  Alb  3.4  /  TBili  0.5  /  DBili  x   /  AST  23  /  ALT  35  /  AlkPhos  111  11-24          Urinalysis Basic - ( 24 Nov 2023 12:51 )    Color: x / Appearance: x / SG: x / pH: x  Gluc: 443 mg/dL / Ketone: x  / Bili: x / Urobili: x   Blood: x / Protein: x / Nitrite: x   Leuk Esterase: x / RBC: x / WBC x   Sq Epi: x / Non Sq Epi: x / Bacteria: x        Anion Gap: 8 11-24 @ 12:51  CK: -- 11-24 @ 12:51  Troponin:  --  11-24 @ 12:51  Pro-BNP:  --  11-24 @ 12:51  VBG:  --  11-24 @ 12:51  Carboxyhemoglobin %:  --  11-24 @ 12:51  Methemoglobin %:  --  11-24 @ 12:51  Osmolality Serum:  --  11-24 @ 12:51  Aspirin Level: --  11-24 @ 12:51  Acetaminophen Level:  --  11-24 @ 12:51  Ethanol Level:  --  11-24 @ 12:51  Digoxin Level:  --  11-24 @ 12:51  Phenytoin Level:  --  11-24 @ 12:51  Carbamazepine level:  --  11-24 @ 12:51  Lamotrigine level:  --  11-24 @ 12:51

## 2025-07-18 ENCOUNTER — APPOINTMENT (OUTPATIENT)
Dept: OTOLARYNGOLOGY | Facility: CLINIC | Age: 85
End: 2025-07-18

## 2025-08-26 ENCOUNTER — EMERGENCY (EMERGENCY)
Facility: HOSPITAL | Age: 85
LOS: 1 days | End: 2025-08-26
Attending: EMERGENCY MEDICINE | Admitting: EMERGENCY MEDICINE
Payer: MEDICARE

## 2025-08-26 VITALS
TEMPERATURE: 98 F | RESPIRATION RATE: 16 BRPM | SYSTOLIC BLOOD PRESSURE: 165 MMHG | DIASTOLIC BLOOD PRESSURE: 79 MMHG | HEART RATE: 60 BPM | OXYGEN SATURATION: 98 %

## 2025-08-26 VITALS
TEMPERATURE: 98 F | SYSTOLIC BLOOD PRESSURE: 142 MMHG | HEART RATE: 68 BPM | RESPIRATION RATE: 18 BRPM | DIASTOLIC BLOOD PRESSURE: 80 MMHG | WEIGHT: 164.91 LBS | OXYGEN SATURATION: 99 % | HEIGHT: 68 IN

## 2025-08-26 PROCEDURE — 72125 CT NECK SPINE W/O DYE: CPT | Mod: 26

## 2025-08-26 PROCEDURE — 73080 X-RAY EXAM OF ELBOW: CPT | Mod: 26,RT

## 2025-08-26 PROCEDURE — 71250 CT THORAX DX C-: CPT | Mod: 26

## 2025-08-26 PROCEDURE — 73200 CT UPPER EXTREMITY W/O DYE: CPT

## 2025-08-26 PROCEDURE — 73060 X-RAY EXAM OF HUMERUS: CPT | Mod: 26,RT

## 2025-08-26 PROCEDURE — 73030 X-RAY EXAM OF SHOULDER: CPT

## 2025-08-26 PROCEDURE — 73030 X-RAY EXAM OF SHOULDER: CPT | Mod: 26,RT

## 2025-08-26 PROCEDURE — 73502 X-RAY EXAM HIP UNI 2-3 VIEWS: CPT

## 2025-08-26 PROCEDURE — 99284 EMERGENCY DEPT VISIT MOD MDM: CPT | Mod: 25

## 2025-08-26 PROCEDURE — 73080 X-RAY EXAM OF ELBOW: CPT

## 2025-08-26 PROCEDURE — 73502 X-RAY EXAM HIP UNI 2-3 VIEWS: CPT | Mod: 26,RT

## 2025-08-26 PROCEDURE — 70450 CT HEAD/BRAIN W/O DYE: CPT | Mod: 26

## 2025-08-26 PROCEDURE — 99285 EMERGENCY DEPT VISIT HI MDM: CPT

## 2025-08-26 PROCEDURE — 72125 CT NECK SPINE W/O DYE: CPT

## 2025-08-26 PROCEDURE — 70450 CT HEAD/BRAIN W/O DYE: CPT

## 2025-08-26 PROCEDURE — 73060 X-RAY EXAM OF HUMERUS: CPT

## 2025-08-26 PROCEDURE — 73200 CT UPPER EXTREMITY W/O DYE: CPT | Mod: 26,RT

## 2025-08-26 PROCEDURE — 71250 CT THORAX DX C-: CPT

## 2025-08-26 RX ORDER — OXYCODONE HYDROCHLORIDE AND ACETAMINOPHEN 10; 325 MG/1; MG/1
1 TABLET ORAL
Qty: 12 | Refills: 0
Start: 2025-08-26 | End: 2025-08-28